# Patient Record
Sex: FEMALE | Race: WHITE | NOT HISPANIC OR LATINO | Employment: OTHER | URBAN - METROPOLITAN AREA
[De-identification: names, ages, dates, MRNs, and addresses within clinical notes are randomized per-mention and may not be internally consistent; named-entity substitution may affect disease eponyms.]

---

## 2017-06-22 ENCOUNTER — TRANSFERRED RECORDS (OUTPATIENT)
Dept: HEALTH INFORMATION MANAGEMENT | Facility: CLINIC | Age: 72
End: 2017-06-22

## 2017-07-06 ENCOUNTER — TRANSFERRED RECORDS (OUTPATIENT)
Dept: HEALTH INFORMATION MANAGEMENT | Facility: CLINIC | Age: 72
End: 2017-07-06

## 2019-08-15 ENCOUNTER — TRANSFERRED RECORDS (OUTPATIENT)
Dept: HEALTH INFORMATION MANAGEMENT | Facility: CLINIC | Age: 74
End: 2019-08-15

## 2019-09-17 ENCOUNTER — TRANSFERRED RECORDS (OUTPATIENT)
Dept: HEALTH INFORMATION MANAGEMENT | Facility: CLINIC | Age: 74
End: 2019-09-17

## 2021-01-19 ENCOUNTER — TRANSFERRED RECORDS (OUTPATIENT)
Dept: HEALTH INFORMATION MANAGEMENT | Facility: CLINIC | Age: 76
End: 2021-01-19

## 2022-01-06 ENCOUNTER — TRANSFERRED RECORDS (OUTPATIENT)
Dept: HEALTH INFORMATION MANAGEMENT | Facility: CLINIC | Age: 77
End: 2022-01-06
Payer: COMMERCIAL

## 2022-01-12 ENCOUNTER — TRANSFERRED RECORDS (OUTPATIENT)
Dept: HEALTH INFORMATION MANAGEMENT | Facility: CLINIC | Age: 77
End: 2022-01-12
Payer: COMMERCIAL

## 2022-01-12 ENCOUNTER — LAB REQUISITION (OUTPATIENT)
Dept: LAB | Facility: CLINIC | Age: 77
End: 2022-01-12

## 2022-01-12 PROCEDURE — 88377 M/PHMTRC ALYS ISHQUANT/SEMIQ: CPT | Performed by: FAMILY MEDICINE

## 2022-01-12 PROCEDURE — 88291 CYTO/MOLECULAR REPORT: CPT | Performed by: MEDICAL GENETICS

## 2022-01-12 PROCEDURE — 88305 TISSUE EXAM BY PATHOLOGIST: CPT | Performed by: PATHOLOGY

## 2022-01-13 ENCOUNTER — TRANSFERRED RECORDS (OUTPATIENT)
Dept: HEALTH INFORMATION MANAGEMENT | Facility: CLINIC | Age: 77
End: 2022-01-13
Payer: COMMERCIAL

## 2022-01-14 LAB
INTERPRETATION: NORMAL
PATH REPORT.COMMENTS IMP SPEC: ABNORMAL
PATH REPORT.COMMENTS IMP SPEC: YES
PATH REPORT.FINAL DX SPEC: ABNORMAL
PATH REPORT.GROSS SPEC: ABNORMAL
PATH REPORT.MICROSCOPIC SPEC OTHER STN: ABNORMAL
PATH REPORT.RELEVANT HX SPEC: ABNORMAL
PHOTO IMAGE: ABNORMAL

## 2022-01-19 ENCOUNTER — MEDICAL CORRESPONDENCE (OUTPATIENT)
Dept: HEALTH INFORMATION MANAGEMENT | Facility: CLINIC | Age: 77
End: 2022-01-19
Payer: COMMERCIAL

## 2022-01-19 ENCOUNTER — TRANSCRIBE ORDERS (OUTPATIENT)
Dept: OTHER | Age: 77
End: 2022-01-19
Payer: COMMERCIAL

## 2022-01-19 DIAGNOSIS — C50.912 INVASIVE DUCTAL CARCINOMA OF LEFT BREAST (H): Primary | ICD-10-CM

## 2022-01-20 ENCOUNTER — PATIENT OUTREACH (OUTPATIENT)
Dept: ONCOLOGY | Facility: CLINIC | Age: 77
End: 2022-01-20
Payer: COMMERCIAL

## 2022-01-20 NOTE — PROGRESS NOTES
New Patient Oncology Nurse Navigator Note     Referring provider: Dr. Cat Wharton     Referring Clinic/Organization: [facility])     Referred to (specialty: Medical Oncology and Cancer Surgery      Date Referral Received: January 20, 2022     Evaluation for:  Breast cancer     Clinical History (per Nurse review of records provided):      Di had a bilateral diagnostic mammogram on 1/6/22 showing interval development of an oval, well-circumscribed 14 mm mass in the outer inferior left breast, anterior depth, just deep to the palpable marker.  Nothing specific for malignancy in the right breast.      1/6/22 Ultrasound showed 1.2 X 1.5 hypoechoic multi-lobulated mass at 4:00 4.0 cm from nipple of left breast.     1/12/22 - Left breast US-guided needle core biopsy showed IDC, Grade 2, ER/KS NEGATIVE, HER BY FISH POSITIVE    Records Location: [facility]     Records Needed:   Breast imaging from past 5 years    Telephoned and spoke with Di.  [place] location is preferred by patient.  Chart reviewed and instructions sent to NPS to book Mitch on 1/31 and Zarina on 2/1.  Call transferred to NPS to finalize appointments. Zaria Pino RN

## 2022-01-24 NOTE — PROGRESS NOTES
RECORDS STATUS - BREAST    RECORDS REQUESTED FROM: [facility]/ [facility]    DATE REQUESTED: 1/31/2022   NOTES DETAILS STATUS   OFFICE NOTE from referring provider Complete Dr. Cat Wharton   OFFICE NOTE from medical oncologist N/A    OFFICE NOTE from surgeon Complete See Breast Biopsy in EPIC   OFFICE NOTE from radiation oncologist     DISCHARGE SUMMARY from hospital     DISCHARGE REPORT from the ER     OPERATIVE REPORT Complete See Breast Biopsy in EPIC   MEDICATION LIST     CLINICAL TRIAL TREATMENTS TO DATE     LABS     REQUEST BLOCKS FOR ALL BREAST CANCER PTS     PATHOLOGY REPORTS  (Tissue diagnosis, Stage, ER/NJ percentage positive and intensity of staining, HER2 IHC, FISH, and all biopsies from breast and any distant metastasis)                 Complete 1/12/2022  Breast, left, 4:00 4.0 cm from nipple, ultrasound-guided needle core biopsy:  - Invasive ductal carcinoma with micropapillary features:                - Nikolski grade: II/III ; Denise score: 6/9 (tubules:3; nuclear:2; mitosis:1)                - Angiolymphatic invasion not identified in the planes examined.                - Ductal carcinoma in-situ: Present, cribriform, high grade with focal necrosis.                - Microcalcifications: Present; associated with invasive carcinoma.                - Longest linear length of contiguous invasive tumor: 9.0 mm                - ER: Negative (no staining in tumor nuclei)                - NJ: Negative (no staining in tumor nuclei)   GENONOMIC TESTING     TYPE:   (Next Generation Sequencing, including Foundation One testing, and Oncotype score) Complete 1/12/2022 Her 2 Gadiel Fish    IMAGING (NEED IMAGES & REPORT)     CT SCANS     MRI     MAMMO Complete- [facility]     [employer] Shipping Label:  [id number]    ULTRASOUND Complete - [facility]     PET     BONE SCAN     BRAIN MRI       Action    Action Taken 1/24/2022 3:26pm MARTINA WHITTAKER called pt Di - she had breast imaging done at the [facility]    1/27/2022 11:28AM MARTINA  I called [facility] Ph: [phone] #3 #1- I left a vm     I called [facility] Ph: [phone] - Fax: [phone]    1:29pm MARTINA   I faxed image reports to HIM

## 2022-01-25 NOTE — TELEPHONE ENCOUNTER
Action January 25, 2022 8:16 AM ABT   Action Taken Records from [facility] received and sent to HIM

## 2022-01-31 ENCOUNTER — ONCOLOGY VISIT (OUTPATIENT)
Dept: ONCOLOGY | Facility: CLINIC | Age: 77
End: 2022-01-31
Payer: COMMERCIAL

## 2022-01-31 ENCOUNTER — PRE VISIT (OUTPATIENT)
Dept: ONCOLOGY | Facility: CLINIC | Age: 77
End: 2022-01-31

## 2022-01-31 ENCOUNTER — PATIENT OUTREACH (OUTPATIENT)
Dept: ONCOLOGY | Facility: CLINIC | Age: 77
End: 2022-01-31

## 2022-01-31 VITALS
RESPIRATION RATE: 16 BRPM | BODY MASS INDEX: 34.15 KG/M2 | HEART RATE: 66 BPM | WEIGHT: 217.6 LBS | OXYGEN SATURATION: 99 % | HEIGHT: 67 IN | SYSTOLIC BLOOD PRESSURE: 153 MMHG | DIASTOLIC BLOOD PRESSURE: 86 MMHG

## 2022-01-31 DIAGNOSIS — T45.1X5A CHEMOTHERAPY INDUCED CARDIOMYOPATHY (H): ICD-10-CM

## 2022-01-31 DIAGNOSIS — C50.912 HER2-POSITIVE CARCINOMA OF LEFT BREAST (H): ICD-10-CM

## 2022-01-31 DIAGNOSIS — Z17.1 MALIGNANT NEOPLASM OF OVERLAPPING SITES OF LEFT BREAST IN FEMALE, ESTROGEN RECEPTOR NEGATIVE (H): Primary | ICD-10-CM

## 2022-01-31 DIAGNOSIS — I42.7 CHEMOTHERAPY INDUCED CARDIOMYOPATHY (H): ICD-10-CM

## 2022-01-31 DIAGNOSIS — Z17.31 HER2-POSITIVE CARCINOMA OF LEFT BREAST (H): ICD-10-CM

## 2022-01-31 DIAGNOSIS — C50.812 MALIGNANT NEOPLASM OF OVERLAPPING SITES OF LEFT BREAST IN FEMALE, ESTROGEN RECEPTOR NEGATIVE (H): Primary | ICD-10-CM

## 2022-01-31 PROCEDURE — 99204 OFFICE O/P NEW MOD 45 MIN: CPT | Performed by: INTERNAL MEDICINE

## 2022-01-31 RX ORDER — LISINOPRIL 40 MG/1
20 TABLET ORAL
COMMUNITY

## 2022-01-31 RX ORDER — GABAPENTIN 600 MG/1
600 TABLET ORAL 3 TIMES DAILY
COMMUNITY

## 2022-01-31 RX ORDER — MONTELUKAST SODIUM 4 MG/1
1 TABLET, CHEWABLE ORAL 2 TIMES DAILY
COMMUNITY

## 2022-01-31 RX ORDER — LEVOTHYROXINE SODIUM 112 UG/1
112 TABLET ORAL
COMMUNITY

## 2022-01-31 RX ORDER — BACLOFEN 10 MG/1
10 TABLET ORAL
COMMUNITY
End: 2023-02-09

## 2022-01-31 RX ORDER — INSULIN GLARGINE 100 [IU]/ML
40 INJECTION, SOLUTION SUBCUTANEOUS
COMMUNITY
End: 2023-06-01

## 2022-01-31 ASSESSMENT — MIFFLIN-ST. JEOR: SCORE: 1504.66

## 2022-01-31 ASSESSMENT — PAIN SCALES - GENERAL: PAINLEVEL: NO PAIN (0)

## 2022-01-31 NOTE — LETTER
"    2022         RE: Di Donaldson  [address]  [place] [address]      Oncology Rooming Note    2022 2:41 PM   Di Donaldson is a 76 year old female who presents for:    Chief Complaint   Patient presents with     Oncology Clinic Visit     New Patient     Initial Vitals: BP (!) 153/86 (BP Location: Left arm)   Pulse 66   Resp 16   Ht 1.702 m (5' 7\")   Wt 98.7 kg (217 lb 9.6 oz)   SpO2 99%   BMI 34.08 kg/m   Estimated body mass index is 34.08 kg/m  as calculated from the following:    Height as of this encounter: 1.702 m (5' 7\").    Weight as of this encounter: 98.7 kg (217 lb 9.6 oz). Body surface area is 2.16 meters squared.  No Pain (0) Comment: Data Unavailable   No LMP recorded. Patient has had a hysterectomy.  Allergies reviewed: Yes  Medications reviewed: Yes    Medications: Medication refills not needed today.  Pharmacy name entered into [employer]: [id number] - [place], [place] - [address] [facility]    Clinical concerns: New Patient- breast cancer       April RASHAD Darnell                [facility] Hematology / Oncology  Initial Visit / Consultation Note 2022  Name: Di Donaldson  :  1945  MRN:  [id number]    --------------------    Assessment / Plan:  Early-stage, left-sided, hormone negative, HER-2 positive breast cancer.    Over the course of our visit, Di and I reviewed the natural history of what appears to be early stage, hormone negative, HER-2 positive breast cancer.  Based upon the size of the known tumor and the HER-2 positivity, I would recommend a course of neoadjuvant therapy using Taxol/Herceptin/Perjeta for 3 months preoperatively.  The goal would be to achieve a complete pathologic response rate which is associated with the best possible postsurgical prognosis.  If she achieves a complete pathologic response, we would plan on adjuvant Herceptin for a total of 9 months.  If she continues to have residual disease despite " neoadjuvant chemotherapy, we would likely anticipate a switch to adjuvant Kadcyla for a total of 9 months.  There is a clinical trial that she potentially could be eligible for as well from a neoadjuvant standpoint.  For now, I will reach out to her upcoming breast cancer surgeon Dr. Srinivasan to discuss additional imaging as well.  We reviewed the logistics of systemic therapy administration as well as the need for a port and an echo pretreatment.  All questions answered to the best of my abilities.  With her family history of multiple cancers including breast, referral to genetic counseling will be placed as well.  Di is in agreement with the plan.    Thank you kindly for this consultation.  Osorio Meyer MD    --------------------    Interval History:  Di present for evaluation of left-sided breast cancer.  She presented with a palpable mass.    Diagnostic left breast ultrasound January 6 demonstrates a 1.2 x 1.5 cm hypoechoic mass with micro lobulations and internal vascularity at the 4 o'clock position in the left breast 4 cm from the nipple correlating with the palpable abnormality.  The left axilla was negative for adenopathy.    Diagnostic mammogram January 6 revealed an oval well-circumscribed 14 mm mass in the outer inferior left breast anterior depth just deep to the palpable marker.  No abnormalities noted in the right breast.  And a biopsy clip was noted in the left breast.      Breast biopsy performed January 12 revealed invasive ductal carcinoma with micropapillary features grade 2 of 3.  Angiolymphatic invasion was not noted.  Ductal carcinoma in situ was present with a cribriform high-grade focal necrosis pattern.  Microcalcifications were present.  ER and RI were both negative and HER-2 was positive.    Beyond the palpable mass, she will has not noticed any other symptoms.  No breast changes or skin changes.    --------------------    Review of Systems:  10 point ROS negative except for  "that above.    Past Medical / Surgical History:  Patient Active Problem List   Diagnosis     Malignant neoplasm of overlapping sites of left breast in female, estrogen receptor negative (H)       Family History:  Positive for multiple sisters and a mother with breast cancer.    Social History:  Social History     Socioeconomic History     Marital status: Single     Spouse name: None     Number of children: None     Years of education: None     Highest education level: None   Occupational History     None   Tobacco Use     Smoking status: Never Smoker     Smokeless tobacco: Never Used   Substance and Sexual Activity     Alcohol use: None     Drug use: None     Sexual activity: None   Other Topics Concern     None   Social History Narrative     None     Social Determinants of Health     Financial Resource Strain: Not on file   Food Insecurity: Not on file   Transportation Needs: Not on file   Physical Activity: Not on file   Stress: Not on file   Social Connections: Not on file   Intimate Partner Violence: Not on file   Housing Stability: Not on file       Medications / Allergies:  Reviewed in EMR.    --------------------    Physical Exam:  VS: BP (!) 153/86 (BP Location: Left arm)   Pulse 66   Resp 16   Ht 1.702 m (5' 7\")   Wt 98.7 kg (217 lb 9.6 oz)   SpO2 99%   BMI 34.08 kg/m    GEN: Well appearing.  ROXANN: No palpable axillary adenopathy.    Labs / Imaging / Path:  We reviewed labs, personally reviewed imaging and reviewed pathology reports        Osorio Meyer MD  "

## 2022-01-31 NOTE — PROGRESS NOTES
"Oncology Rooming Note    January 31, 2022 2:41 PM   Di Donaldson is a 76 year old female who presents for:    Chief Complaint   Patient presents with     Oncology Clinic Visit     New Patient     Initial Vitals: BP (!) 153/86 (BP Location: Left arm)   Pulse 66   Resp 16   Ht 1.702 m (5' 7\")   Wt 98.7 kg (217 lb 9.6 oz)   SpO2 99%   BMI 34.08 kg/m   Estimated body mass index is 34.08 kg/m  as calculated from the following:    Height as of this encounter: 1.702 m (5' 7\").    Weight as of this encounter: 98.7 kg (217 lb 9.6 oz). Body surface area is 2.16 meters squared.  No Pain (0) Comment: Data Unavailable   No LMP recorded. Patient has had a hysterectomy.  Allergies reviewed: Yes  Medications reviewed: Yes    Medications: Medication refills not needed today.  Pharmacy name entered into [employer]: [id number] - [place], [place] - [address] [facility] [address] [facility]    Clinical concerns: New Patient- breast cancer       Letty Darnell LPN              "

## 2022-01-31 NOTE — PATIENT INSTRUCTIONS
1) PORT.  2) ECHO.  3) Pending PA, weekly taxol / herceptin x 12 weeks w/ q3 week perjeta.  4) Provider weeks 1/4/7/10.      Today:  Plan for port placement and ECHO soon!  Will start treatment as soon as port is in place.    Consult with Dr. Reese for Port Date/Time: 2/2/22 at 10:30    ECHO Date/Time: 2/2/22 at 11:00    Port placement Date: 2/7/22    Chemotherapy teaching Date/Time: 2/4/22 at 12:30    Please follow up with Dr. Meyer with as schedule with treatments.  See Calendar!    Labs Date/Time: 2/7/22 at 8:30  Video Visit Dr. Meyer Date/Time: 2/8/22 at 8:45  Infusion Date/Time:  2/8/22 at 10:00    If you have any questions or concerns please feel free to call.    If you need to reschedule please call:  Clinic or Lab Appointment - [phone]  Infusion - [phone]  Imaging - [phone]    Rony Longo, RN, BSN, OCN  [facility]   Oncology/Hematology Care Coordinator RN  [facility]  [phone]    After Hours Oncology Nurse Line - [phone]

## 2022-02-01 ENCOUNTER — PATIENT OUTREACH (OUTPATIENT)
Dept: ONCOLOGY | Facility: CLINIC | Age: 77
End: 2022-02-01

## 2022-02-01 ENCOUNTER — OFFICE VISIT (OUTPATIENT)
Dept: SURGERY | Facility: CLINIC | Age: 77
End: 2022-02-01
Payer: COMMERCIAL

## 2022-02-01 VITALS — WEIGHT: 213.1 LBS | HEIGHT: 67 IN | BODY MASS INDEX: 33.45 KG/M2

## 2022-02-01 DIAGNOSIS — Z17.1 MALIGNANT NEOPLASM OF CENTRAL PORTION OF LEFT BREAST IN FEMALE, ESTROGEN RECEPTOR NEGATIVE (H): Primary | ICD-10-CM

## 2022-02-01 DIAGNOSIS — C50.112 MALIGNANT NEOPLASM OF CENTRAL PORTION OF LEFT BREAST IN FEMALE, ESTROGEN RECEPTOR NEGATIVE (H): Primary | ICD-10-CM

## 2022-02-01 PROBLEM — C50.412 MALIGNANT NEOPLASM OF UPPER-OUTER QUADRANT OF LEFT BREAST IN FEMALE, ESTROGEN RECEPTOR NEGATIVE (H): Status: ACTIVE | Noted: 2022-01-31

## 2022-02-01 PROCEDURE — 88305 TISSUE EXAM BY PATHOLOGIST: CPT | Performed by: PATHOLOGY

## 2022-02-01 PROCEDURE — 99205 OFFICE O/P NEW HI 60 MIN: CPT | Performed by: SURGERY

## 2022-02-01 RX ORDER — TRIAMCINOLONE ACETONIDE 1 MG/G
OINTMENT TOPICAL
COMMUNITY
Start: 2022-01-18

## 2022-02-01 RX ORDER — INSULIN LISPRO 100 [IU]/ML
INJECTION, SOLUTION INTRAVENOUS; SUBCUTANEOUS
COMMUNITY

## 2022-02-01 ASSESSMENT — MIFFLIN-ST. JEOR: SCORE: 1484.25

## 2022-02-01 ASSESSMENT — PAIN SCALES - GENERAL: PAINLEVEL: NO PAIN (0)

## 2022-02-01 NOTE — PROGRESS NOTES
NEW SURGICAL CONSULTATION  Feb 1, 2022    Di Donaldson is a 76 year old woman who presents with a left  breast complaint.  She was self-referred.    HPI:    She noted a left breast mass over the past 2 months.  She notes no other masses in either breast, axilla, or neck. She denies any nipple discharge or nipple inversion.     Imaging showed a 1.5 cm mass at 4:00 4 cm FN in the LEFT breast.    A biopsy was performed and a clip was placed.  It showed invasive ductal carcinoma, grade 2, ER- [place]- HER2/mathew amplified.     She met with Dr Meyer of Medical Oncology yesterday, who recommended neoadjuvant systemic therapy.     BREAST-SPECIFIC HISTORY:  Prior breast biopsies: Yes - right, benign  Prior breast surgeries: No  Prior radiation history: No  Bra size: 42 DD  Dominant hand: Right    FAMILY HISTORY:  Breast ca: Yes - mother (dx 40s), older sister (dx 60s), and younger sisters x2 (dx 60s)  Ovarian ca: No  Pancreatic ca: No  Melanoma: No  Gastric ca: No  Colon ca: No  Other cancer: Yes brother w/ uncertain type(s) of cancer, including bone cancer, prostate cancer    Her children are adopted.    Past Medical History:   Diagnosis Date     hypertension      Hypothyroidism      Type 2 diabetes mellitus (H)      No MI, CVA  HbA1c 6.2 (per patient, in January 2022)    No past surgical history on file.   Partial hysterectomy (one ovary remaining)  Cervical spine surgery  No GA issues    Current Outpatient Medications   Medication Sig Dispense Refill     baclofen (LIORESAL) 10 MG tablet Take 10 mg by mouth       colestipol (COLESTID) 1 g tablet Take 1 g by mouth 2 times daily        gabapentin (NEURONTIN) 600 MG tablet Take 600 mg by mouth 3 times daily        insulin glargine (BASAGLAR KWIKPEN) 100 UNIT/ML pen Inject 40 Units Subcutaneous       insulin lispro (HUMALOG KWIKPEN) 100 UNIT/ML (1 unit dial) KWIKPEN Inject Subcutaneous 3 times daily (before meals)       levothyroxine (SYNTHROID/LEVOTHROID) 112 MCG tablet  "Take 112 mcg by mouth       lisinopril (ZESTRIL) 40 MG tablet Take 20 mg by mouth       magnesium 100 MG CAPS Take 400 mg by mouth 2 times daily        triamcinolone (KENALOG) 0.1 % external ointment APPLY TO AFFECTED AREA EVERY NIGHT AT BEDTIME             Allergies   Allergen Reactions     Hmg-Coa-R Inhibitors Unknown     Clindamycin Other (See Comments)     mild        SOCIAL HISTORY:  Smokes: No    She is retired and worked at a  center.    ROS:  Back pain: No  Headache: No - hx of migraines (resolved with c-spine fusion)  Abdominal pain: No  Unexpected weight loss: No  Easy bruising/bleeding: No    Ht 1.702 m (5' 7\")   Wt 96.7 kg (213 lb 1.6 oz)   BMI 33.38 kg/m     Physical Exam  Constitutional:       Appearance: She is well-developed.   Chest:   Breasts: Breasts are symmetrical (bilateral ptosis).      Right: No inverted nipple, mass, nipple discharge, skin change, tenderness, axillary adenopathy or supraclavicular adenopathy.      Left: Mass (2.5 cm firm mass just below the skin surface with overlying skin indentation that is not fixed but that tracks towards the nipple) and skin change present. No inverted nipple (There is a central ulceration/indentation in the left nipple that is not present on the right (see photo).), nipple discharge, tenderness, axillary adenopathy or supraclavicular adenopathy.            Comments: Patient was examined in both supine and upright positions.   Lymphadenopathy:      Cervical: No cervical adenopathy.      Right cervical: No superficial, deep or posterior cervical adenopathy.     Left cervical: No superficial, deep or posterior cervical adenopathy.      Upper Body:      Right upper body: No supraclavicular, axillary or pectoral adenopathy.      Left upper body: No supraclavicular, axillary or pectoral adenopathy.      Comments: No lymphedema in bilateral upper extremities.   Skin:     General: Skin is warm and dry.        INVESTIGATIONS:    Left Breast Ultrasound " from [facility] (1/6/2022) showed:  FINDINGS: Diagnostic left breast US demonstrates a 1.2 x 1.5 cm taller than wide hypoechoic mass with microlobulations and moderate internal vascularity, in the 4:00 position of the left breast, 4 cm from the nipple. This finding correlates with the palpable abnormality. Scannign of the left axilla is negative for adenopathy.  BIRADS 5    Bilateral Diagnostic Mammogram from [facility] (1/6/2022) showed:  Density: There are scattered areas of fibroglandular ensity.  Findings: Interval development of an oval, well-circumscribed 14 mm mass in the outer inferior left breast, anterior depth, just deep to the palpable marker. Nothing specific for malignancy in the right breast. Biopsy clip left breast.  BIRADS 0    Biopsy (1/12/2022) showed:  Final Diagnosis   Breast, left, 4:00 4.0 cm from nipple, ultrasound-guided needle core biopsy:  - Invasive ductal carcinoma with micropapillary features:                - Conde grade: II/III ; Denise score: 6/9 (tubules:3; nuclear:2; mitosis:1)                - Angiolymphatic invasion not identified in the planes examined.                - Ductal carcinoma in-situ: Present, cribriform, high grade with focal necrosis.                - Microcalcifications: Present; associated with invasive carcinoma.                - Longest linear length of contiguous invasive tumor: 9.0 mm   ER negative  MD negative  HER2/mathew positive    ASSESSMENT:  Di Donaldson is a 76 year old woman with a HER2 positive LEFT breast cancer.    Her stage is:  Cancer Staging  Malignant neoplasm of upper-outer quadrant of left breast in female, estrogen receptor negative (H)  Staging form: Breast, AJCC 8th Edition  - Clinical: Stage IA (cT1c, cN0, cM0, G2, ER-, MD-, HER2+) - Signed by Ryann Srinivasan MD on 2/1/2022    I personally reviewed the imaging above.    I reviewed the imaging, diagnosis, staging, and management (including surgery,  "chemotherapy, radiation therapy, and endocrine therapy) of breast cancer with Di Donaldson and her daughter Shauna. A copy of the biopsy pathology report was also provided.    I am concerned about the proximity of the palpable mass to the nipple and the central ulceration in the nipple.  I recommended a punch biopsy of the nipple to rule out Paget's disease of the breast.  Di Donaldson and her daughter were in agreement with this.      The mainstay of treatment for resectable breast cancer is surgical resection, in the form of either breast conservation (segmental mastectomy plus radiation) or mastectomy.  We reviewed that the two strategies are equivalent in terms of overall survival.  The advantages and disadvantages of each were discussed.    The surgeries are performed as day surgeries.  Di Donaldson IS a candidate for breast conservation therapy.  We discussed that this involves two necessary components: the lumpectomy (or \"segmental mastectomy\"), and several weeks of whole breast radiation therapy.  We discussed that the overall survival after breast conservation therapy is identical to mastectomy and that local recurrence rates are significantly higher if segmental mastectomy was performed without subsequent radiation.  We also discussed the significance of clear margins and that a subsequent procedure may be necessary to achieve this.  Depending on the nipple biopsy results, a central segmental mastectomy (which would include the nipple-areolar complex) may be needed.    The risks of a segmental mastectomy were discussed with the patient and family, including the risks of bleeding, wound infection, wound dehiscence, and post-operative contour change (including scar formation) to the breast.  Depending on the margin status post-resection, further surgery may be necessary.     Alternatively, we also discussed the option of mastectomy, including total, skin-sparing, and nipple-sparing mastectomy.  " We reviewed that the nipple-sparing technique is cosmetic; sensation and contractility will likely be lost.  Di Donaldson is not a candidate for nipple-sparing mastectomy owing to ptosis.  The risks of a mastectomy were discussed with the patient and family, including the risks of bleeding, wound infection, wound dehiscence, skin flap/nipple necrosis, poor cosmetic outcomes with skin folds, decreased shoulder range of motion, chest wall numbness, etc.   A drain would be placed intra-operatively. Depending on the margin and fox status post-mastectomy, radiation may be necessary.      In addition to the surgical management of the breast, a sentinel lymph node biopsy is recommended for fox staging of the axilla.  This is performed with the combination of the radioactive Tilmanocept and lymphazurin. The risks of a sentinel lymph node biopsy were discussed with the patient and family, including the risks of lymphedema (5-10%), bleeding, wound infection, wound dehiscence, seroma formation, and paresthesias. There is an approximately 10% false negative rate associated with sentinel lymph node biopsy as published in the literature.  The findings of the sentinel lymph node biopsy may result in the need for further surgery (i.e. Axillary lymph node dissection) or radiation. There is a 1-2% chance of patients whose sentinel lymph nodes do not map despite dual tracer (radioactive Tilmanocept and lymphazurin). Should this be the case, we discussed that I would proceed with an axillary lymph node dissection at the index procedure if proceeding with a mastectomy.  The higher risks of an axillary lymph node dissection were also reviewed, including lymphedema (20-30%), bleeding, wound infection, wound dehiscence, seroma formation, nerve injury, limited arm range of motion and paresthesias. We discussed that a drain would be placed intra-operatively should an axillary lymph node dissection be performed.     We discussed that  surgical pathology results will be reviewed at the postoperative visit to allow for careful discussion of next steps and for answering questions.    Finally, given that she has a HER2 amplified cancer, she will begin with neoadjuvant systemic therapy.  Surgery is typically performed 4-6 weeks after neoadjuvant chemotherapy is completed.  We reviewed that the benefits of neoadjuvant systemic therapy include potential prognostic information from tumor pathology post-chemotherapy and the ability to determine adjuvant therapy. She will proceed with this with Dr Meyer.    We reviewed the genetic testing guidelines by the American Society of Breast Surgeons from February 2019.  I have recommended genetic counseling; however, at age 76, I would not necessarily recommend risk reduction surgery if she were diagnosed with a pathogenic variant, as the benefits likely do not outweigh the risks.     All of the above was discussed with Di Donaldson and all questions were answered.  She elected to proceed with left nipple biopsy today and neoadjuvant systemic therapy per Dr Meyer.    Total time spent with the patient was 60 minutes, of which 75% was counseling.     PLAN:  1. Genetic counseling  2. Neoadjuvant systemic therapy per Dr Meyer  3. Left nipple punch biopsy  4. Follow up with me at the end of neoadjuvant systemic therapy for surgical planning.    Ryann Srinivasan MD MSc [facility] FACS  Assistant Professor of Surgery  Division of Surgical Oncology  [facility]     70 minutes spent on the date of the encounter doing chart review, review of outside records, review of test results, interpretation of tests, patient visit, documentation and discussion with family.  The punch biopsy documented below is time in addition to that documented above.    --    PROCEDURE NOTE:    LEFT nipple punch biopsy    With informed consent and a time out, the left nipple was prepped with Chloraprep.  0.5 mL of 1% lidocaine with  epinephrine was infiltrated. A 2 mm punch biopsy was taken of the central ulceration in the left nipple.  The tissue was placed in formalin and sent to pathology.  Hemostasis was achieved with pressure and a simple 5-0 monocryl.  Vaseline and a dressing were applied. Patient and her daughter were provided wound care instructions.  Patient tolerated the procedure well.    Ryann Srinivasan MD MSc FRCSC FACS  Assistant Professor of Surgery  Division of Surgical Oncology  [facility]

## 2022-02-01 NOTE — PROGRESS NOTES
[facility] Hematology / Oncology  Initial Visit / Consultation Note 2022  Name: Di Donaldson  :  1945  MRN:  [id number]    --------------------    Assessment / Plan:  Early-stage, left-sided, hormone negative, HER-2 positive breast cancer.    Over the course of our visit, Di and I reviewed the natural history of what appears to be early stage, hormone negative, HER-2 positive breast cancer.  Based upon the size of the known tumor and the HER-2 positivity, I would recommend a course of neoadjuvant therapy using Taxol/Herceptin/Perjeta for 3 months preoperatively.  The goal would be to achieve a complete pathologic response rate which is associated with the best possible postsurgical prognosis.  If she achieves a complete pathologic response, we would plan on adjuvant Herceptin for a total of 9 months.  If she continues to have residual disease despite neoadjuvant chemotherapy, we would likely anticipate a switch to adjuvant Kadcyla for a total of 9 months.  There is a clinical trial that she potentially could be eligible for as well from a neoadjuvant standpoint.  For now, I will reach out to her upcoming breast cancer surgeon Dr. Srinivasan to discuss additional imaging as well.  We reviewed the logistics of systemic therapy administration as well as the need for a port and an echo pretreatment.  All questions answered to the best of my abilities.  With her family history of multiple cancers including breast, referral to genetic counseling will be placed as well.  Di is in agreement with the plan.    Thank you kindly for this consultation.  Osorio Meyer MD    --------------------    Interval History:  Di present for evaluation of left-sided breast cancer.  She presented with a palpable mass.    Diagnostic left breast ultrasound  demonstrates a 1.2 x 1.5 cm hypoechoic mass with micro lobulations and internal vascularity at the 4 o'clock position in the left breast 4  cm from the nipple correlating with the palpable abnormality.  The left axilla was negative for adenopathy.    Diagnostic mammogram January 6 revealed an oval well-circumscribed 14 mm mass in the outer inferior left breast anterior depth just deep to the palpable marker.  No abnormalities noted in the right breast.  And a biopsy clip was noted in the left breast.      Breast biopsy performed January 12 revealed invasive ductal carcinoma with micropapillary features grade 2 of 3.  Angiolymphatic invasion was not noted.  Ductal carcinoma in situ was present with a cribriform high-grade focal necrosis pattern.  Microcalcifications were present.  ER and ID were both negative and HER-2 was positive.    Beyond the palpable mass, she will has not noticed any other symptoms.  No breast changes or skin changes.    --------------------    Review of Systems:  10 point ROS negative except for that above.    Past Medical / Surgical History:  Patient Active Problem List   Diagnosis     Malignant neoplasm of overlapping sites of left breast in female, estrogen receptor negative (H)       Family History:  Positive for multiple sisters and a mother with breast cancer.    Social History:  Social History     Socioeconomic History     Marital status: Single     Spouse name: None     Number of children: None     Years of education: None     Highest education level: None   Occupational History     None   Tobacco Use     Smoking status: Never Smoker     Smokeless tobacco: Never Used   Substance and Sexual Activity     Alcohol use: None     Drug use: None     Sexual activity: None   Other Topics Concern     None   Social History Narrative     None     Social Determinants of Health     Financial Resource Strain: Not on file   Food Insecurity: Not on file   Transportation Needs: Not on file   Physical Activity: Not on file   Stress: Not on file   Social Connections: Not on file   Intimate Partner Violence: Not on file   Housing Stability:  "Not on file       Medications / Allergies:  Reviewed in EMR.    --------------------    Physical Exam:  VS: BP (!) 153/86 (BP Location: Left arm)   Pulse 66   Resp 16   Ht 1.702 m (5' 7\")   Wt 98.7 kg (217 lb 9.6 oz)   SpO2 99%   BMI 34.08 kg/m    GEN: Well appearing.  ROXANN: No palpable axillary adenopathy.    Labs / Imaging / Path:  We reviewed labs, personally reviewed imaging and reviewed pathology reports  "

## 2022-02-01 NOTE — PROGRESS NOTES
[facility]: Chemotherapy Education Notes    Chemotherapy education was completed with patient and daughter today in person. Handouts from [employer] were discussed and provided to the patient to take home. Reviewed the following information with the patient and her daughter.    Chemotherapy Regimen and Schedule: Taxol/Herceptin  with infusions every week x 12 weeks are planned.    Tests and/or procedures required prior to chemotherapy start:   1. Port  2. Echo    General Chemotherapy Information: Specific medication names and medication delivery methods; possible chemotherapy side effects and management of side effects, including but not limited to: skin changes/nail changes, anemia, neutropenia, thrombocytopenia, diarrhea/constipation, nausea/vomiting, hair loss, memory changes, mouth sores, taste changes, appetite changes, peripheral neuropathy, fatigue, infertility, myelosuppression, increased risk for infection, increased risk for bleeding and/or bruising, possible allergic or hypersensitivity reaction.  Reviewed the importance of infection prevention, and ways to stay healthy during chemotherapy including eating a health, well-balanced diet, adequate protein intake, and drinking plenty of fluids.  Reviewed the practice of closely monitoring lab values throughout chemotherapy treatment, what lab tests will be checked (and what changes of these values meant), along with the possibility of IV hydration or blood product transfusion, or the need to defer or hold treatment.  Also reviewed signs/symptoms that should be reported to care team or on-call provider immediately, including: temperature of 100.4 degrees or higher, shortness of breath, chest pain, unusual bruising, bleeding symptoms, extreme fatigue, >4-6 episodes of diarrhea in 24 hours, uncontrolled nausea/vomiting, symptoms of dehydration, or signs of an allergic reaction.      Reviewed importance of Central line care (port-a-cath) or IV site care.   "Provided [employer] handout \"Vascular Access Port Implantation,\" discussed port placement procedure and rationale for port placement, as well as usage and purpose of EMLA cream prior to port access.     MD has discussed potential gonadotoxicity and detrimental effects on fertility related to chemotherapy treatment.  Patient verbalizes understanding.    Written Information: Patient was provided with the following handouts from [employer]: \"Getting Ready for Chemotherapy: What to Expect, Before, During, and After your Treatment,\" \"Eating Hints: Before, During, and After Cancer Treatment,\" printouts on possible chemotherapy side effects/ways to cope with side effects, \"When to Call the Doctor,\" and \"Self-Care Tips During Cancer Treatment.\"  Patient was also provided with drug-specific handouts from [facility].  Patient was also provided with information regarding various programs offered at [facility], hair loss resources (if applicable), a list of resources for cancer patients, as well as important contact information for our cancer clinic including scheduling team, nurse triage line, direct phone number for RN Care Coordinator, and the after-hours Nurse Advice Line.    No barriers to learning identified. Patient and daughter verbalized understanding of all written and verbal information. All questions answered to patient s satisfaction.  Learning barrier identified, as patient reports she cannot read. Daughter agrees to review education given with patient. Patient states understanding and is in agreement with this plan.  Patient accompanied by writer to  to begin scheduling needs. Patient instructed to call with further questions or concerns.    Loan Mir RN  Oncology Care Coordinator  [facility]        "

## 2022-02-01 NOTE — LETTER
2/1/2022         RE: iD Donaldson  [id number]  [place] [address]        Dear Colleague,    Thank you for referring your patient, Di Donaldson, to the [facility]. Please see a copy of my visit note below.    NEW SURGICAL CONSULTATION  Feb 1, 2022    Di Donaldson is a 76 year old woman who presents with a left  breast complaint.  She was self-referred.    HPI:    She noted a left breast mass over the past 2 months.  She notes no other masses in either breast, axilla, or neck. She denies any nipple discharge or nipple inversion.     Imaging showed a 1.5 cm mass at 4:00 4 cm FN in the LEFT breast.    A biopsy was performed and a clip was placed.  It showed invasive ductal carcinoma, grade 2, ER- UT- HER2/mathew amplified.     She met with Dr Meyer of Medical Oncology yesterday, who recommended neoadjuvant systemic therapy.     BREAST-SPECIFIC HISTORY:  Prior breast biopsies: Yes - right, benign  Prior breast surgeries: No  Prior radiation history: No  Bra size: 42 DD  Dominant hand: Right    FAMILY HISTORY:  Breast ca: Yes - mother (dx 40s), older sister (dx 60s), and younger sisters x2 (dx 60s)  Ovarian ca: No  Pancreatic ca: No  Melanoma: No  Gastric ca: No  Colon ca: No  Other cancer: Yes brother w/ uncertain type(s) of cancer, including bone cancer, prostate cancer    Her children are adopted.    Past Medical History:   Diagnosis Date     hypertension      Hypothyroidism      Type 2 diabetes mellitus (H)      No MI, CVA  HbA1c 6.2 (per patient, in January 2022)    No past surgical history on file.   Partial hysterectomy (one ovary remaining)  Cervical spine surgery  No GA issues    Current Outpatient Medications   Medication Sig Dispense Refill     baclofen (LIORESAL) 10 MG tablet Take 10 mg by mouth       colestipol (COLESTID) 1 g tablet Take 1 g by mouth 2 times daily        gabapentin (NEURONTIN) 600 MG tablet Take 600 mg by mouth 3 times daily        insulin  "glargine (BASAGLAR KWIKPEN) 100 UNIT/ML pen Inject 40 Units Subcutaneous       insulin lispro (HUMALOG KWIKPEN) 100 UNIT/ML (1 unit dial) KWIKPEN Inject Subcutaneous 3 times daily (before meals)       levothyroxine (SYNTHROID/LEVOTHROID) 112 MCG tablet Take 112 mcg by mouth       lisinopril (ZESTRIL) 40 MG tablet Take 20 mg by mouth       magnesium 100 MG CAPS Take 400 mg by mouth 2 times daily        triamcinolone (KENALOG) 0.1 % external ointment APPLY TO AFFECTED AREA EVERY NIGHT AT BEDTIME             Allergies   Allergen Reactions     Hmg-Coa-R Inhibitors Unknown     Clindamycin Other (See Comments)     mild        SOCIAL HISTORY:  Smokes: No    She is retired and worked at a  center.    ROS:  Back pain: No  Headache: No - hx of migraines (resolved with c-spine fusion)  Abdominal pain: No  Unexpected weight loss: No  Easy bruising/bleeding: No    Ht 1.702 m (5' 7\")   Wt 96.7 kg (213 lb 1.6 oz)   BMI 33.38 kg/m     Physical Exam  Constitutional:       Appearance: She is well-developed.   Chest:   Breasts: Breasts are symmetrical (bilateral ptosis).      Right: No inverted nipple, mass, nipple discharge, skin change, tenderness, axillary adenopathy or supraclavicular adenopathy.      Left: Mass (2.5 cm firm mass just below the skin surface with overlying skin indentation that is not fixed but that tracks towards the nipple) and skin change present. No inverted nipple (There is a central ulceration/indentation in the left nipple that is not present on the right (see photo).), nipple discharge, tenderness, axillary adenopathy or supraclavicular adenopathy.            Comments: Patient was examined in both supine and upright positions.   Lymphadenopathy:      Cervical: No cervical adenopathy.      Right cervical: No superficial, deep or posterior cervical adenopathy.     Left cervical: No superficial, deep or posterior cervical adenopathy.      Upper Body:      Right upper body: No supraclavicular, axillary " or pectoral adenopathy.      Left upper body: No supraclavicular, axillary or pectoral adenopathy.      Comments: No lymphedema in bilateral upper extremities.   Skin:     General: Skin is warm and dry.        INVESTIGATIONS:    Left Breast Ultrasound from [facility] (1/6/2022) showed:  FINDINGS: Diagnostic left breast US demonstrates a 1.2 x 1.5 cm taller than wide hypoechoic mass with microlobulations and moderate internal vascularity, in the 4:00 position of the left breast, 4 cm from the nipple. This finding correlates with the palpable abnormality. Scannign of the left axilla is negative for adenopathy.  BIRADS 5    Bilateral Diagnostic Mammogram from [facility] (1/6/2022) showed:  Density: There are scattered areas of fibroglandular ensity.  Findings: Interval development of an oval, well-circumscribed 14 mm mass in the outer inferior left breast, anterior depth, just deep to the palpable marker. Nothing specific for malignancy in the right breast. Biopsy clip left breast.  BIRADS 0    Biopsy (1/12/2022) showed:  Final Diagnosis   Breast, left, 4:00 4.0 cm from nipple, ultrasound-guided needle core biopsy:  - Invasive ductal carcinoma with micropapillary features:                - Denise grade: II/III ; Denise score: 6/9 (tubules:3; nuclear:2; mitosis:1)                - Angiolymphatic invasion not identified in the planes examined.                - Ductal carcinoma in-situ: Present, cribriform, high grade with focal necrosis.                - Microcalcifications: Present; associated with invasive carcinoma.                - Longest linear length of contiguous invasive tumor: 9.0 mm   ER negative  TN negative  HER2/mathew positive    ASSESSMENT:  Di Donaldson is a 76 year old woman with a HER2 positive LEFT breast cancer.    Her stage is:  Cancer Staging  Malignant neoplasm of upper-outer quadrant of left breast in female, estrogen receptor negative (H)  Staging form: Breast, AJCC 8th  "Edition  - Clinical: Stage IA (cT1c, cN0, cM0, G2, ER-, MS-, HER2+) - Signed by Ryann Srinivasan MD on 2/1/2022    I personally reviewed the imaging above.    I reviewed the imaging, diagnosis, staging, and management (including surgery, chemotherapy, radiation therapy, and endocrine therapy) of breast cancer with Di Donaldson and her daughter Shauna. A copy of the biopsy pathology report was also provided.    I am concerned about the proximity of the palpable mass to the nipple and the central ulceration in the nipple.  I recommended a punch biopsy of the nipple to rule out Paget's disease of the breast.  Di Donaldson and her daughter were in agreement with this.      The mainstay of treatment for resectable breast cancer is surgical resection, in the form of either breast conservation (segmental mastectomy plus radiation) or mastectomy.  We reviewed that the two strategies are equivalent in terms of overall survival.  The advantages and disadvantages of each were discussed.    The surgeries are performed as day surgeries.  Di Donaldson IS a candidate for breast conservation therapy.  We discussed that this involves two necessary components: the lumpectomy (or \"segmental mastectomy\"), and several weeks of whole breast radiation therapy.  We discussed that the overall survival after breast conservation therapy is identical to mastectomy and that local recurrence rates are significantly higher if segmental mastectomy was performed without subsequent radiation.  We also discussed the significance of clear margins and that a subsequent procedure may be necessary to achieve this.  Depending on the nipple biopsy results, a central segmental mastectomy (which would include the nipple-areolar complex) may be needed.    The risks of a segmental mastectomy were discussed with the patient and family, including the risks of bleeding, wound infection, wound dehiscence, and post-operative contour change " (including scar formation) to the breast.  Depending on the margin status post-resection, further surgery may be necessary.     Alternatively, we also discussed the option of mastectomy, including total, skin-sparing, and nipple-sparing mastectomy.  We reviewed that the nipple-sparing technique is cosmetic; sensation and contractility will likely be lost.  Di Donaldson is not a candidate for nipple-sparing mastectomy owing to ptosis.  The risks of a mastectomy were discussed with the patient and family, including the risks of bleeding, wound infection, wound dehiscence, skin flap/nipple necrosis, poor cosmetic outcomes with skin folds, decreased shoulder range of motion, chest wall numbness, etc.   A drain would be placed intra-operatively. Depending on the margin and fox status post-mastectomy, radiation may be necessary.      In addition to the surgical management of the breast, a sentinel lymph node biopsy is recommended for fox staging of the axilla.  This is performed with the combination of the radioactive Tilmanocept and lymphazurin. The risks of a sentinel lymph node biopsy were discussed with the patient and family, including the risks of lymphedema (5-10%), bleeding, wound infection, wound dehiscence, seroma formation, and paresthesias. There is an approximately 10% false negative rate associated with sentinel lymph node biopsy as published in the literature.  The findings of the sentinel lymph node biopsy may result in the need for further surgery (i.e. Axillary lymph node dissection) or radiation. There is a 1-2% chance of patients whose sentinel lymph nodes do not map despite dual tracer (radioactive Tilmanocept and lymphazurin). Should this be the case, we discussed that I would proceed with an axillary lymph node dissection at the index procedure if proceeding with a mastectomy.  The higher risks of an axillary lymph node dissection were also reviewed, including lymphedema (20-30%), bleeding,  wound infection, wound dehiscence, seroma formation, nerve injury, limited arm range of motion and paresthesias. We discussed that a drain would be placed intra-operatively should an axillary lymph node dissection be performed.     We discussed that surgical pathology results will be reviewed at the postoperative visit to allow for careful discussion of next steps and for answering questions.    Finally, given that she has a HER2 amplified cancer, she will begin with neoadjuvant systemic therapy.  Surgery is typically performed 4-6 weeks after neoadjuvant chemotherapy is completed.  We reviewed that the benefits of neoadjuvant systemic therapy include potential prognostic information from tumor pathology post-chemotherapy and the ability to determine adjuvant therapy. She will proceed with this with Dr Meyer.    We reviewed the genetic testing guidelines by the American Society of Breast Surgeons from February 2019.  I have recommended genetic counseling; however, at age 76, I would not necessarily recommend risk reduction surgery if she were diagnosed with a pathogenic variant, as the benefits likely do not outweigh the risks.     All of the above was discussed with Di Donaldson and all questions were answered.  She elected to proceed with left nipple biopsy today and neoadjuvant systemic therapy per Dr Meyer.    Total time spent with the patient was 60 minutes, of which 75% was counseling.     PLAN:  1. Genetic counseling  2. Neoadjuvant systemic therapy per Dr Meyer  3. Left nipple punch biopsy  4. Follow up with me at the end of neoadjuvant systemic therapy for surgical planning.    Ryann Srinivasan MD MSc [facility] FACS  Assistant Professor of Surgery  Division of Surgical Oncology  [facility]     70 minutes spent on the date of the encounter doing chart review, review of outside records, review of test results, interpretation of tests, patient visit, documentation and discussion with family.   The punch biopsy documented below is time in addition to that documented above.    --    PROCEDURE NOTE:    LEFT nipple punch biopsy    With informed consent and a time out, the left nipple was prepped with Chloraprep.  0.5 mL of 1% lidocaine with epinephrine was infiltrated. A 2 mm punch biopsy was taken of the central ulceration in the left nipple.  The tissue was placed in formalin and sent to pathology.  Hemostasis was achieved with pressure and a simple 5-0 monocryl.  Vaseline and a dressing were applied. Patient and her daughter were provided wound care instructions.  Patient tolerated the procedure well.    Ryann Srinivasan MD MSc [facility] FACS  Assistant Professor of Surgery  Division of Surgical Oncology  [facility]       Again, thank you for allowing me to participate in the care of your patient.        Sincerely,        Ryann Srinivasan MD

## 2022-02-01 NOTE — PATIENT INSTRUCTIONS
Wound Care After a Biopsy    What is a skin biopsy?  A skin biopsy allows the doctor to examine a very small piece of tissue under the microscope to determine the diagnosis and the best treatment for the skin condition. A local anesthetic (numbing medicine)  is injected with a very small needle into the skin area to be tested. A small piece of skin is taken from the area. Sometimes a suture (stitch) is used.     What are the risks of a skin biopsy?  I will experience scar, bleeding, swelling, pain, crusting and redness. I may experience incomplete removal or recurrence. Risks of this procedure are excessive bleeding, bruising, infection, nerve damage, numbness, thick (hypertrophic or keloidal) scar and non-diagnostic biopsy.    How should I care for my wound for the first 48 hours?    Keep the wound dry and covered for 48 hours    If it bleeds, hold direct pressure on the area for 15 minutes. If bleeding does not stop then go to the emergency room    Avoid strenuous exercise the first 1-2 days or as your doctor instructs you    How should I care for the wound after 48 hours?    After 48 hours, remove the bandage    You may bathe or shower as normal    If you had a scalp biopsy, you can shampoo as usual and can use shower water to clean the biopsy site daily    Clean the wound twice a day with gentle soap and water    Do not scrub, be gentle    Apply white petroleum/Vaseline after cleaning the wound with a cotton swab or a clean finger, and keep the site covered with a Bandaid /bandage. Bandages are not necessary with a scalp biopsy    If you are unable to cover the site with a Bandaid /bandage, re-apply ointment 2-3 times a day to keep the site moist. Moisture will help with healing    Avoid strenuous activity for first 1-2 days    Avoid lakes, rivers, pools, and oceans until the stitches are removed or the site is healed    How do I clean my wound?    Wash hands thoroughly with soap or use hand  before all  wound care    Clean the wound with gentle soap and water    Apply white petroleum/Vaseline  to wound after it is clean    Replace the Bandaid /bandage to keep the wound covered for the first few days or as instructed by your doctor    If you had a scalp biopsy, warm shower water to the area on a daily basis should suffice    What should I use to clean my wound?     Cotton-tipped applicators (Qtips )    White petroleum jelly (Vaseline ). Use a clean new container and use Q-tips to apply.    Bandaids   as needed    Gentle soap     How should I care for my wound long term?    Do not get your wound dirty    Keep up with wound care for one week or until the area is healed.    A small scab will form and fall off by itself when the area is completely healed. The area will be red and will become pink in color as it heals. Sun protection is very important for how your scar will turn out. Sunscreen with an SPF 30 or greater is recommended once the area is healed.    If you have stitches, stitches need to be removed in  days. You may return to our clinic for this or you may have it done locally at your doctor s office.    You should have some soreness but it should be mild and slowly go away over several days. Talk to your doctor about using tylenol for pain,    When should I call my doctor?  If you have increased:     Pain or swelling    Pus or drainage (clear or slightly yellow drainage is ok)    Temperature over 100F    Spreading redness or warmth around wound    When will I hear about my results?  The biopsy results can take 2 weeks to come back.  Your results will automatically release to [employer] before your provider has even reviewed them.  The clinic will call you with the results, send you a [facility] message, or have you schedule a follow-up clinic or phone time to discuss the results.  Contact our clinics if you do not hear from us in 2 weeks.    Who should I call with questions?    [facility],  [facility]oli Powers: [phone]    [facility]: [phone]    For urgent needs outside of business hours call the [facility] at [phone] and ask for the dermatology resident on call

## 2022-02-01 NOTE — NURSING NOTE
The following medication was given:     MEDICATION:  Lidocaine with epinephrine 1% [id number]  ROUTE: SQ  SITE: see procedure note  DOSE:  2 mL  LOT #: [id number]  : [employer]  EXPIRATION DATE: 08/01/2022  NDC#: 4346-2744-94  Was there drug waste? no  Multi-dose vial: Yes    Megan Rousseau CMA  February 1, 2022

## 2022-02-01 NOTE — NURSING NOTE
"Di Donaldson's chief complaint for this visit includes:  Chief Complaint   Patient presents with     Consult     invasive ductal carcinoma of left breast     PCP: Cat Wharton    Referring Provider:  No referring provider defined for this encounter.    Ht 1.702 m (5' 7\")   Wt 96.7 kg (213 lb 1.6 oz)   BMI 33.38 kg/m    No Pain (0)     Do you need any medication refills at today's visit? No    Megan Rousseau CMA        "

## 2022-02-02 ENCOUNTER — TELEPHONE (OUTPATIENT)
Dept: SURGERY | Facility: CLINIC | Age: 77
End: 2022-02-02

## 2022-02-02 ENCOUNTER — HOSPITAL ENCOUNTER (OUTPATIENT)
Dept: CARDIOLOGY | Facility: CLINIC | Age: 77
Discharge: HOME | End: 2022-02-02
Attending: INTERNAL MEDICINE | Admitting: INTERNAL MEDICINE
Payer: COMMERCIAL

## 2022-02-02 ENCOUNTER — OFFICE VISIT (OUTPATIENT)
Dept: SURGERY | Facility: CLINIC | Age: 77
End: 2022-02-02
Payer: COMMERCIAL

## 2022-02-02 VITALS
SYSTOLIC BLOOD PRESSURE: 130 MMHG | WEIGHT: 212 LBS | TEMPERATURE: 97.4 F | HEIGHT: 67 IN | BODY MASS INDEX: 33.27 KG/M2 | DIASTOLIC BLOOD PRESSURE: 70 MMHG

## 2022-02-02 DIAGNOSIS — Z17.1 MALIGNANT NEOPLASM OF OVERLAPPING SITES OF LEFT BREAST IN FEMALE, ESTROGEN RECEPTOR NEGATIVE (H): ICD-10-CM

## 2022-02-02 DIAGNOSIS — Z17.1 MALIGNANT NEOPLASM OF CENTRAL PORTION OF LEFT BREAST IN FEMALE, ESTROGEN RECEPTOR NEGATIVE (H): Primary | ICD-10-CM

## 2022-02-02 DIAGNOSIS — C50.812 MALIGNANT NEOPLASM OF OVERLAPPING SITES OF LEFT BREAST IN FEMALE, ESTROGEN RECEPTOR NEGATIVE (H): ICD-10-CM

## 2022-02-02 DIAGNOSIS — C50.112 MALIGNANT NEOPLASM OF CENTRAL PORTION OF LEFT BREAST IN FEMALE, ESTROGEN RECEPTOR NEGATIVE (H): Primary | ICD-10-CM

## 2022-02-02 DIAGNOSIS — T45.1X5A CHEMOTHERAPY INDUCED CARDIOMYOPATHY (H): ICD-10-CM

## 2022-02-02 DIAGNOSIS — I42.7 CHEMOTHERAPY INDUCED CARDIOMYOPATHY (H): ICD-10-CM

## 2022-02-02 DIAGNOSIS — Z11.59 ENCOUNTER FOR SCREENING FOR OTHER VIRAL DISEASES: Primary | ICD-10-CM

## 2022-02-02 LAB — LVEF ECHO: NORMAL

## 2022-02-02 PROCEDURE — 99203 OFFICE O/P NEW LOW 30 MIN: CPT | Performed by: SURGERY

## 2022-02-02 PROCEDURE — 93306 TTE W/DOPPLER COMPLETE: CPT | Mod: 26 | Performed by: INTERNAL MEDICINE

## 2022-02-02 PROCEDURE — 93306 TTE W/DOPPLER COMPLETE: CPT

## 2022-02-02 RX ORDER — CEFAZOLIN SODIUM 2 G/100ML
2 INJECTION, SOLUTION INTRAVENOUS
Status: CANCELLED | OUTPATIENT
Start: 2022-02-02

## 2022-02-02 RX ORDER — CEFAZOLIN SODIUM 2 G/100ML
2 INJECTION, SOLUTION INTRAVENOUS SEE ADMIN INSTRUCTIONS
Status: CANCELLED | OUTPATIENT
Start: 2022-02-02

## 2022-02-02 ASSESSMENT — MIFFLIN-ST. JEOR: SCORE: 1479.26

## 2022-02-02 NOTE — TELEPHONE ENCOUNTER
Type of surgery: ultrasound guided right internal jugular venous access port placement with flouroscopy    Location of surgery: [facility]  Date and time of surgery: 2/7  Surgeon: Hugh  Pre-Op Appt Date: MD will do   Post-Op Appt Date: NA   Packet sent out: Not Applicable  Pre-cert/Authorization completed:  Not Applicable  Date: na

## 2022-02-02 NOTE — PROGRESS NOTES
Patient seen in consultation for port placement    HPI:  Patient is a 76 year old female with left sided breast CA in need of hitesh-adjuvant chemotherapy. She is here to discuss port placement. She has not had any surgery to the anterior head or neck area. No trauma to the clavicle. Not taking blood thinning medications.    Review Of Systems    Skin: negative  Ears/Nose/Throat: negative  Respiratory: No shortness of breath, dyspnea on exertion, cough, or hemoptysis  Cardiovascular: negative  Gastrointestinal: negative  Genitourinary: negative  Musculoskeletal: negative  Neurologic: negative  Hematologic/Lymphatic/Immunologic: negative  Endocrine: negative        Past Medical History:   Diagnosis Date     hypertension      Hypothyroidism      Type 2 diabetes mellitus (H)        No past surgical history on file.    No family history on file.    Social History     Socioeconomic History     Marital status: Single     Spouse name: Not on file     Number of children: Not on file     Years of education: Not on file     Highest education level: Not on file   Occupational History     Not on file   Tobacco Use     Smoking status: Never Smoker     Smokeless tobacco: Never Used   Substance and Sexual Activity     Alcohol use: Not on file     Drug use: Not on file     Sexual activity: Not on file   Other Topics Concern     Not on file   Social History Narrative     Not on file     Social Determinants of Health     Financial Resource Strain: Not on file   Food Insecurity: Not on file   Transportation Needs: Not on file   Physical Activity: Not on file   Stress: Not on file   Social Connections: Not on file   Intimate Partner Violence: Not on file   Housing Stability: Not on file       Current Outpatient Medications   Medication Sig Dispense Refill     baclofen (LIORESAL) 10 MG tablet Take 10 mg by mouth       colestipol (COLESTID) 1 g tablet Take 1 g by mouth 2 times daily        gabapentin (NEURONTIN) 600 MG tablet Take 600 mg by  "mouth 3 times daily        insulin glargine (BASAGLAR KWIKPEN) 100 UNIT/ML pen Inject 40 Units Subcutaneous       insulin lispro (HUMALOG KWIKPEN) 100 UNIT/ML (1 unit dial) KWIKPEN Inject Subcutaneous 3 times daily (before meals)       levothyroxine (SYNTHROID/LEVOTHROID) 112 MCG tablet Take 112 mcg by mouth       lisinopril (ZESTRIL) 40 MG tablet Take 20 mg by mouth       magnesium 100 MG CAPS Take 400 mg by mouth 2 times daily        triamcinolone (KENALOG) 0.1 % external ointment APPLY TO AFFECTED AREA EVERY NIGHT AT BEDTIME         Medications and history reviewed    Physical exam:  Vitals: /70   Temp 97.4  F (36.3  C) (Temporal)   Ht 1.702 m (5' 7\")   Wt 96.2 kg (212 lb)   BMI 33.20 kg/m    BMI= Body mass index is 33.2 kg/m .    Constitutional: Healthy, alert, non-distressed   Head: Normo-cephalic, atraumatic, no lesions, masses or tenderness   Cardiovascular: RRR, no new murmurs, +S1, +S2 heart sounds, no clicks, rubs or gallops   Respiratory: CTAB, no rales, rhonchi or wheezing, equal chest rise, good respiratory effort   Gastrointestinal: Soft, non-tender, non distended, no rebound rigidity or guarding, no masses or hernias palpated   : Deferred  Musculoskeletal: Moves all extremities, normal  strength, no deformities noted   Skin: No suspicious lesions or rashes   Psychiatric: Mentation appears normal, affect appropriate   Hematologic/Lymphatic/Immunologic: Normal cervical and supraclavicular lymph nodes   Patient able to get up on table without difficulty.    Labs show:  No results found for this or any previous visit (from the past 24 hour(s)).    Imaging shows:  Recent Results (from the past 744 hour(s))   Echocardiogram Complete   Result Value    LVEF  60-65%    Narrative    [id number]  [id number]  [id number]  [id number]^MARGY^LENNY^QUE     [facility]  Echocardiography Laboratory  [address] [place] [address]     Name: RANJAN GAMA  MRN: [id number]  : " 1945  Study Date: 02/02/2022 10:58 AM  Age: 76 yrs  Gender: Female  Patient Location: [facility]  Reason For Study: Malignant neoplasm of overlapping sites of left breast in  female  History: Cancer  Ordering Physician: LENNY JI  Referring Physician: LENNY JI  Performed By: Viki Drummond     BSA: 2.1 m2  Height: 67 in  Weight: 217 lb  HR: 62  BP: 130/70 mmHg  ______________________________________________________________________________  Procedure  Complete Echo Adult. Myocardial Strain Imaging performed.  ______________________________________________________________________________  Interpretation Summary     The left ventricle is normal in size.  Left ventricular systolic function is normal.  The visual ejection fraction is 60-65%.  Global peak LV longitudinal strain is averaged at -18.1%. This is within  reported normal limits (normal <-18%).  Mild valvular aortic stenosis.  There is no comparison study available.  ______________________________________________________________________________  Left Ventricle  The left ventricle is normal in size. There is normal left ventricular wall  thickness. Left ventricular systolic function is normal. The visual ejection  fraction is 60-65%. Grade I or early diastolic dysfunction. Global peak LV  longitudinal strain is averaged at -18.1%. This is within reported normal  limits (normal <-18%). Diastolic Doppler findings (E/E' ratio and/or other  parameters) suggest left ventricular filling pressures are normal. Normal left  ventricular wall motion.     Right Ventricle  The right ventricle is normal in structure, function and size.     Atria  Normal left atrial size. Right atrial size is normal. There is no atrial shunt  seen.     Mitral Valve  The mitral valve is normal in structure and function. There is trace mitral  regurgitation.     Tricuspid Valve  The tricuspid valve is normal in structure and function. Right ventricle  systolic pressure  estimate normal. There is trace tricuspid regurgitation. The  right ventricular systolic pressure is approximated at 20.9 mmHg plus the  right atrial pressure. IVC diameter <2.1 cm collapsing >50% with sniff  suggests a normal RA pressure of 3 mmHg.     Aortic Valve  No aortic regurgitation is present. The calculated aortic valve are is 1.6  cm^2. The peak AoV pressure gradient is 23.0 mmHg. The mean AoV pressure  gradient is 12.5 mmHg. Mild valvular aortic stenosis.     Pulmonic Valve  The pulmonic valve is not well seen, but is grossly normal. There is trace  pulmonic valvular regurgitation.     Vessels  Normal size aorta.     Pericardium  There is no pericardial effusion.     Rhythm  Sinus rhythm was noted.  ______________________________________________________________________________  MMode/2D Measurements & Calculations  IVSd: 1.1 cm     LVIDd: 3.4 cm  LVIDs: 2.5 cm  LVPWd: 1.1 cm  FS: 27.0 %  LV mass(C)d: 112.0 grams  LV mass(C)dI: 53.5 grams/m2  Ao root diam: 2.7 cm  LA dimension: 4.0 cm  asc Aorta Diam: 3.1 cm  LA/Ao: 1.4  LVOT diam: 2.1 cm  LVOT area: 3.3 cm2  LA Volume (BP): 45.3 ml  LA Volume Index (BP): 21.7 ml/m2  RWT: 0.63     Doppler Measurements & Calculations  MV E max scar: 63.8 cm/sec  MV A max scar: 77.8 cm/sec  MV E/A: 0.82  LV IVRT: 0.10 sec  MV dec slope: 255.0 cm/sec2  MV dec time: 0.25 sec  Ao V2 max: 239.5 cm/sec  Ao max P.0 mmHg  Ao V2 mean: 165.4 cm/sec  Ao mean P.5 mmHg  Ao V2 VTI: 54.7 cm  SHEREE(I,D): 1.6 cm2  SHEREE(V,D): 1.5 cm2  LV V1 max P.8 mmHg  LV V1 max: 110.1 cm/sec  LV V1 VTI: 25.9 cm  SV(LVOT): 85.4 ml  SI(LVOT): 40.8 ml/m2  PA acc time: 0.16 sec  PI end-d scar: 77.4 cm/sec  TR max scar: 228.8 cm/sec  TR max P.9 mmHg  AV Scar Ratio (DI): 0.46  SHEREE Index (cm2/m2): 0.75  E/E' av.5  Lateral E/e': 8.1  Medial E/e': 8.9     ______________________________________________________________________________  Report approved by: Andres Umaña 2022 02:25 PM                Assessment:     ICD-10-CM    1. Malignant neoplasm of central portion of left breast in female, estrogen receptor negative (H)  C50.112 Case Request: ultrasound guided right internal jugular venous access port placement with flouroscopy    Z17.1 Case Request: ultrasound guided right internal jugular venous access port placement with flouroscopy     Plan: I recommend US RIJ VAP with flouro. We discussed the procedure in detail. We also discussed the risks, benefits, alternatives and post-op care and restrictions. After our informed discussion we decided to proceed with the proposed surgery.    Rolando Reese, DO

## 2022-02-02 NOTE — TELEPHONE ENCOUNTER
Covid test will be done at [facility] per granddaughter.  I have OR fax # for results.

## 2022-02-02 NOTE — LETTER
2/2/2022         RE: Di Donaldson  [id number]  [place] [id number]        Dear Colleague,    Thank you for referring your patient, Di Donaldson, to the [facility]. Please see a copy of my visit note below.    Patient seen in consultation for port placement    HPI:  Patient is a 76 year old female with left sided breast CA in need of hitesh-adjuvant chemotherapy. She is here to discuss port placement. She has not had any surgery to the anterior head or neck area. No trauma to the clavicle. Not taking blood thinning medications.    Review Of Systems    Skin: negative  Ears/Nose/Throat: negative  Respiratory: No shortness of breath, dyspnea on exertion, cough, or hemoptysis  Cardiovascular: negative  Gastrointestinal: negative  Genitourinary: negative  Musculoskeletal: negative  Neurologic: negative  Hematologic/Lymphatic/Immunologic: negative  Endocrine: negative        Past Medical History:   Diagnosis Date     hypertension      Hypothyroidism      Type 2 diabetes mellitus (H)        No past surgical history on file.    No family history on file.    Social History     Socioeconomic History     Marital status: Single     Spouse name: Not on file     Number of children: Not on file     Years of education: Not on file     Highest education level: Not on file   Occupational History     Not on file   Tobacco Use     Smoking status: Never Smoker     Smokeless tobacco: Never Used   Substance and Sexual Activity     Alcohol use: Not on file     Drug use: Not on file     Sexual activity: Not on file   Other Topics Concern     Not on file   Social History Narrative     Not on file     Social Determinants of Health     Financial Resource Strain: Not on file   Food Insecurity: Not on file   Transportation Needs: Not on file   Physical Activity: Not on file   Stress: Not on file   Social Connections: Not on file   Intimate Partner Violence: Not on file   Housing Stability: Not on file  "      Current Outpatient Medications   Medication Sig Dispense Refill     baclofen (LIORESAL) 10 MG tablet Take 10 mg by mouth       colestipol (COLESTID) 1 g tablet Take 1 g by mouth 2 times daily        gabapentin (NEURONTIN) 600 MG tablet Take 600 mg by mouth 3 times daily        insulin glargine (BASAGLAR KWIKPEN) 100 UNIT/ML pen Inject 40 Units Subcutaneous       insulin lispro (HUMALOG KWIKPEN) 100 UNIT/ML (1 unit dial) KWIKPEN Inject Subcutaneous 3 times daily (before meals)       levothyroxine (SYNTHROID/LEVOTHROID) 112 MCG tablet Take 112 mcg by mouth       lisinopril (ZESTRIL) 40 MG tablet Take 20 mg by mouth       magnesium 100 MG CAPS Take 400 mg by mouth 2 times daily        triamcinolone (KENALOG) 0.1 % external ointment APPLY TO AFFECTED AREA EVERY NIGHT AT BEDTIME         Medications and history reviewed    Physical exam:  Vitals: /70   Temp 97.4  F (36.3  C) (Temporal)   Ht 1.702 m (5' 7\")   Wt 96.2 kg (212 lb)   BMI 33.20 kg/m    BMI= Body mass index is 33.2 kg/m .    Constitutional: Healthy, alert, non-distressed   Head: Normo-cephalic, atraumatic, no lesions, masses or tenderness   Cardiovascular: RRR, no new murmurs, +S1, +S2 heart sounds, no clicks, rubs or gallops   Respiratory: CTAB, no rales, rhonchi or wheezing, equal chest rise, good respiratory effort   Gastrointestinal: Soft, non-tender, non distended, no rebound rigidity or guarding, no masses or hernias palpated   : Deferred  Musculoskeletal: Moves all extremities, normal  strength, no deformities noted   Skin: No suspicious lesions or rashes   Psychiatric: Mentation appears normal, affect appropriate   Hematologic/Lymphatic/Immunologic: Normal cervical and supraclavicular lymph nodes   Patient able to get up on table without difficulty.    Labs show:  No results found for this or any previous visit (from the past 24 hour(s)).    Imaging shows:  Recent Results (from the past 744 hour(s))   Echocardiogram Complete "   Result Value    LVEF  60-65%    [facility]    [id number]  [id number]  [id number]^MARGY^LENNY^QUE     [facility]  Echocardiography Laboratory  [address] [place] [address]     Name: RANJAN GAMA  MRN: [id number]  : 1945  Study Date: 2022 10:58 AM  Age: 76 yrs  Gender: Female  Patient Location: [facility]  Reason For Study: Malignant neoplasm of overlapping sites of left breast in  female  History: Cancer  Ordering Physician: LENNY JI  Referring Physician: LENNY JI  Performed By: Viki Drummond     BSA: 2.1 m2  Height: 67 in  Weight: 217 lb  HR: 62  BP: 130/70 mmHg  ______________________________________________________________________________  Procedure  Complete Echo Adult. Myocardial Strain Imaging performed.  ______________________________________________________________________________  Interpretation Summary     The left ventricle is normal in size.  Left ventricular systolic function is normal.  The visual ejection fraction is 60-65%.  Global peak LV longitudinal strain is averaged at -18.1%. This is within  reported normal limits (normal <-18%).  Mild valvular aortic stenosis.  There is no comparison study available.  ______________________________________________________________________________  Left Ventricle  The left ventricle is normal in size. There is normal left ventricular wall  thickness. Left ventricular systolic function is normal. The visual ejection  fraction is 60-65%. Grade I or early diastolic dysfunction. Global peak LV  longitudinal strain is averaged at -18.1%. This is within reported normal  limits (normal <-18%). Diastolic Doppler findings (E/E' ratio and/or other  parameters) suggest left ventricular filling pressures are normal. Normal left  ventricular wall motion.     Right Ventricle  The right ventricle is normal in structure, function and size.     Atria  Normal left atrial size. Right atrial size is  normal. There is no atrial shunt  seen.     Mitral Valve  The mitral valve is normal in structure and function. There is trace mitral  regurgitation.     Tricuspid Valve  The tricuspid valve is normal in structure and function. Right ventricle  systolic pressure estimate normal. There is trace tricuspid regurgitation. The  right ventricular systolic pressure is approximated at 20.9 mmHg plus the  right atrial pressure. IVC diameter <2.1 cm collapsing >50% with sniff  suggests a normal RA pressure of 3 mmHg.     Aortic Valve  No aortic regurgitation is present. The calculated aortic valve are is 1.6  cm^2. The peak AoV pressure gradient is 23.0 mmHg. The mean AoV pressure  gradient is 12.5 mmHg. Mild valvular aortic stenosis.     Pulmonic Valve  The pulmonic valve is not well seen, but is grossly normal. There is trace  pulmonic valvular regurgitation.     Vessels  Normal size aorta.     Pericardium  There is no pericardial effusion.     Rhythm  Sinus rhythm was noted.  ______________________________________________________________________________  MMode/2D Measurements & Calculations  IVSd: 1.1 cm     LVIDd: 3.4 cm  LVIDs: 2.5 cm  LVPWd: 1.1 cm  FS: 27.0 %  LV mass(C)d: 112.0 grams  LV mass(C)dI: 53.5 grams/m2  Ao root diam: 2.7 cm  LA dimension: 4.0 cm  asc Aorta Diam: 3.1 cm  LA/Ao: 1.4  LVOT diam: 2.1 cm  LVOT area: 3.3 cm2  LA Volume (BP): 45.3 ml  LA Volume Index (BP): 21.7 ml/m2  RWT: 0.63     Doppler Measurements & Calculations  MV E max kandis: 63.8 cm/sec  MV A max kandis: 77.8 cm/sec  MV E/A: 0.82  LV IVRT: 0.10 sec  MV dec slope: 255.0 cm/sec2  MV dec time: 0.25 sec  Ao V2 max: 239.5 cm/sec  Ao max P.0 mmHg  Ao V2 mean: 165.4 cm/sec  Ao mean P.5 mmHg  Ao V2 VTI: 54.7 cm  SHEREE(I,D): 1.6 cm2  SHEREE(V,D): 1.5 cm2  LV V1 max P.8 mmHg  LV V1 max: 110.1 cm/sec  LV V1 VTI: 25.9 cm  SV(LVOT): 85.4 ml  SI(LVOT): 40.8 ml/m2  PA acc time: 0.16 sec  PI end-d kandis: 77.4 cm/sec  TR max kandis: 228.8 cm/sec  TR max PG:  20.9 mmHg  AV Scar Ratio (DI): 0.46  SHEREE Index (cm2/m2): 0.75  E/E' av.5  Lateral E/e': 8.1  Medial E/e': 8.9     ______________________________________________________________________________  Report approved by: Andres Umaña 2022 02:25 PM               Assessment:     ICD-10-CM    1. Malignant neoplasm of central portion of left breast in female, estrogen receptor negative (H)  C50.112 Case Request: ultrasound guided right internal jugular venous access port placement with flouroscopy    Z17.1 Case Request: ultrasound guided right internal jugular venous access port placement with flouroscopy     Plan: I recommend US RIJ VAP with flouro. We discussed the procedure in detail. We also discussed the risks, benefits, alternatives and post-op care and restrictions. After our informed discussion we decided to proceed with the proposed surgery.    Rolando Reese DO        Again, thank you for allowing me to participate in the care of your patient.        Sincerely,        Rolando Reese DO

## 2022-02-04 LAB
PATH REPORT.COMMENTS IMP SPEC: NORMAL
PATH REPORT.COMMENTS IMP SPEC: NORMAL
PATH REPORT.FINAL DX SPEC: NORMAL
PATH REPORT.GROSS SPEC: NORMAL
PATH REPORT.MICROSCOPIC SPEC OTHER STN: NORMAL
PATH REPORT.RELEVANT HX SPEC: NORMAL
PHOTO IMAGE: NORMAL

## 2022-02-06 ENCOUNTER — HEALTH MAINTENANCE LETTER (OUTPATIENT)
Age: 77
End: 2022-02-06

## 2022-02-07 ENCOUNTER — HOSPITAL ENCOUNTER (OUTPATIENT)
Dept: GENERAL RADIOLOGY | Facility: CLINIC | Age: 77
End: 2022-02-07
Attending: SURGERY | Admitting: SURGERY
Payer: COMMERCIAL

## 2022-02-07 ENCOUNTER — ANESTHESIA EVENT (OUTPATIENT)
Dept: SURGERY | Facility: CLINIC | Age: 77
End: 2022-02-07
Payer: COMMERCIAL

## 2022-02-07 ENCOUNTER — ANESTHESIA (OUTPATIENT)
Dept: SURGERY | Facility: CLINIC | Age: 77
End: 2022-02-07
Payer: COMMERCIAL

## 2022-02-07 ENCOUNTER — APPOINTMENT (OUTPATIENT)
Dept: LAB | Facility: CLINIC | Age: 77
End: 2022-02-07
Payer: COMMERCIAL

## 2022-02-07 ENCOUNTER — APPOINTMENT (OUTPATIENT)
Dept: GENERAL RADIOLOGY | Facility: CLINIC | Age: 77
End: 2022-02-07
Attending: SURGERY
Payer: COMMERCIAL

## 2022-02-07 ENCOUNTER — HOSPITAL ENCOUNTER (OUTPATIENT)
Facility: CLINIC | Age: 77
Discharge: HOME | End: 2022-02-07
Attending: SURGERY | Admitting: SURGERY
Payer: COMMERCIAL

## 2022-02-07 VITALS
OXYGEN SATURATION: 99 % | DIASTOLIC BLOOD PRESSURE: 78 MMHG | SYSTOLIC BLOOD PRESSURE: 137 MMHG | BODY MASS INDEX: 33.27 KG/M2 | HEIGHT: 67 IN | WEIGHT: 212 LBS | RESPIRATION RATE: 18 BRPM | HEART RATE: 75 BPM | TEMPERATURE: 97 F

## 2022-02-07 DIAGNOSIS — C50.412 MALIGNANT NEOPLASM OF UPPER-OUTER QUADRANT OF LEFT BREAST IN FEMALE, ESTROGEN RECEPTOR NEGATIVE (H): Primary | ICD-10-CM

## 2022-02-07 DIAGNOSIS — C50.112 MALIGNANT NEOPLASM OF CENTRAL PORTION OF LEFT BREAST (H): ICD-10-CM

## 2022-02-07 DIAGNOSIS — Z17.1 MALIGNANT NEOPLASM OF UPPER-OUTER QUADRANT OF LEFT BREAST IN FEMALE, ESTROGEN RECEPTOR NEGATIVE (H): Primary | ICD-10-CM

## 2022-02-07 LAB
ALBUMIN SERPL-MCNC: 3.7 G/DL (ref 3.4–5)
ALP SERPL-CCNC: 90 U/L (ref 40–150)
ALT SERPL W P-5'-P-CCNC: 21 U/L (ref 0–50)
AST SERPL W P-5'-P-CCNC: 13 U/L (ref 0–45)
BASOPHILS # BLD AUTO: 0.1 10E3/UL (ref 0–0.2)
BASOPHILS NFR BLD AUTO: 1 %
BILIRUB DIRECT SERPL-MCNC: 0.1 MG/DL (ref 0–0.2)
BILIRUB SERPL-MCNC: 0.5 MG/DL (ref 0.2–1.3)
EOSINOPHIL # BLD AUTO: 0.3 10E3/UL (ref 0–0.7)
EOSINOPHIL NFR BLD AUTO: 4 %
ERYTHROCYTE [DISTWIDTH] IN BLOOD BY AUTOMATED COUNT: 12.9 % (ref 10–15)
HCT VFR BLD AUTO: 38.5 % (ref 35–47)
HGB BLD-MCNC: 12.6 G/DL (ref 11.7–15.7)
IMM GRANULOCYTES # BLD: 0 10E3/UL
IMM GRANULOCYTES NFR BLD: 0 %
LYMPHOCYTES # BLD AUTO: 1 10E3/UL (ref 0.8–5.3)
LYMPHOCYTES NFR BLD AUTO: 13 %
MCH RBC QN AUTO: 29.6 PG (ref 26.5–33)
MCHC RBC AUTO-ENTMCNC: 32.7 G/DL (ref 31.5–36.5)
MCV RBC AUTO: 91 FL (ref 78–100)
MONOCYTES # BLD AUTO: 0.4 10E3/UL (ref 0–1.3)
MONOCYTES NFR BLD AUTO: 5 %
NEUTROPHILS # BLD AUTO: 5.9 10E3/UL (ref 1.6–8.3)
NEUTROPHILS NFR BLD AUTO: 77 %
NRBC # BLD AUTO: 0 10E3/UL
NRBC BLD AUTO-RTO: 0 /100
PLATELET # BLD AUTO: 177 10E3/UL (ref 150–450)
PROT SERPL-MCNC: 6.9 G/DL (ref 6.8–8.8)
RBC # BLD AUTO: 4.25 10E6/UL (ref 3.8–5.2)
WBC # BLD AUTO: 7.8 10E3/UL (ref 4–11)

## 2022-02-07 PROCEDURE — 999N000141 HC STATISTIC PRE-PROCEDURE NURSING ASSESSMENT: Performed by: SURGERY

## 2022-02-07 PROCEDURE — 77001 FLUOROGUIDE FOR VEIN DEVICE: CPT | Mod: 26 | Performed by: SURGERY

## 2022-02-07 PROCEDURE — 250N000011 HC RX IP 250 OP 636: Performed by: SURGERY

## 2022-02-07 PROCEDURE — C1788 PORT, INDWELLING, IMP: HCPCS | Performed by: SURGERY

## 2022-02-07 PROCEDURE — 710N000012 HC RECOVERY PHASE 2, PER MINUTE: Performed by: SURGERY

## 2022-02-07 PROCEDURE — 250N000011 HC RX IP 250 OP 636: Performed by: NURSE ANESTHETIST, CERTIFIED REGISTERED

## 2022-02-07 PROCEDURE — 271N000001 HC OR GENERAL SUPPLY NON-STERILE: Performed by: SURGERY

## 2022-02-07 PROCEDURE — 999N000179 XR SURGERY CARM FLUORO LESS THAN 5 MIN W STILLS

## 2022-02-07 PROCEDURE — 999N000063 XR CHEST PORT 1 VIEW

## 2022-02-07 PROCEDURE — 36415 COLL VENOUS BLD VENIPUNCTURE: CPT | Performed by: INTERNAL MEDICINE

## 2022-02-07 PROCEDURE — 250N000009 HC RX 250: Performed by: SURGERY

## 2022-02-07 PROCEDURE — 258N000003 HC RX IP 258 OP 636: Performed by: SURGERY

## 2022-02-07 PROCEDURE — 250N000009 HC RX 250: Performed by: NURSE ANESTHETIST, CERTIFIED REGISTERED

## 2022-02-07 PROCEDURE — 258N000003 HC RX IP 258 OP 636: Performed by: NURSE ANESTHETIST, CERTIFIED REGISTERED

## 2022-02-07 PROCEDURE — 80076 HEPATIC FUNCTION PANEL: CPT | Performed by: INTERNAL MEDICINE

## 2022-02-07 PROCEDURE — 360N000082 HC SURGERY LEVEL 2 W/ FLUORO, PER MIN: Performed by: SURGERY

## 2022-02-07 PROCEDURE — 85025 COMPLETE CBC W/AUTO DIFF WBC: CPT | Performed by: INTERNAL MEDICINE

## 2022-02-07 PROCEDURE — 272N000001 HC OR GENERAL SUPPLY STERILE: Performed by: SURGERY

## 2022-02-07 PROCEDURE — 36561 INSERT TUNNELED CV CATH: CPT | Performed by: SURGERY

## 2022-02-07 PROCEDURE — 370N000017 HC ANESTHESIA TECHNICAL FEE, PER MIN: Performed by: SURGERY

## 2022-02-07 DEVICE — CATH PORT POWERPORT 8FR SL W/SUTURE PLUGS 1708000: Type: IMPLANTABLE DEVICE | Site: NECK | Status: FUNCTIONAL

## 2022-02-07 RX ORDER — SODIUM CHLORIDE, SODIUM LACTATE, POTASSIUM CHLORIDE, CALCIUM CHLORIDE 600; 310; 30; 20 MG/100ML; MG/100ML; MG/100ML; MG/100ML
INJECTION, SOLUTION INTRAVENOUS CONTINUOUS
Status: DISCONTINUED | OUTPATIENT
Start: 2022-02-07 | End: 2022-02-07 | Stop reason: HOSPADM

## 2022-02-07 RX ORDER — HEPARIN SODIUM (PORCINE) LOCK FLUSH IV SOLN 100 UNIT/ML 100 UNIT/ML
SOLUTION INTRAVENOUS PRN
Status: DISCONTINUED | OUTPATIENT
Start: 2022-02-07 | End: 2022-02-07 | Stop reason: HOSPADM

## 2022-02-07 RX ORDER — ONDANSETRON 2 MG/ML
4 INJECTION INTRAMUSCULAR; INTRAVENOUS EVERY 30 MIN PRN
Status: DISCONTINUED | OUTPATIENT
Start: 2022-02-07 | End: 2022-02-07 | Stop reason: HOSPADM

## 2022-02-07 RX ORDER — PROPOFOL 10 MG/ML
INJECTION, EMULSION INTRAVENOUS CONTINUOUS PRN
Status: DISCONTINUED | OUTPATIENT
Start: 2022-02-07 | End: 2022-02-07

## 2022-02-07 RX ORDER — ALBUTEROL SULFATE 0.83 MG/ML
2.5 SOLUTION RESPIRATORY (INHALATION) EVERY 4 HOURS PRN
Status: DISCONTINUED | OUTPATIENT
Start: 2022-02-07 | End: 2022-02-07 | Stop reason: HOSPADM

## 2022-02-07 RX ORDER — FENTANYL CITRATE 50 UG/ML
INJECTION, SOLUTION INTRAMUSCULAR; INTRAVENOUS PRN
Status: DISCONTINUED | OUTPATIENT
Start: 2022-02-07 | End: 2022-02-07

## 2022-02-07 RX ORDER — CEFAZOLIN SODIUM 2 G/100ML
2 INJECTION, SOLUTION INTRAVENOUS
Status: COMPLETED | OUTPATIENT
Start: 2022-02-07 | End: 2022-02-07

## 2022-02-07 RX ORDER — BUPIVACAINE HYDROCHLORIDE AND EPINEPHRINE 5; 5 MG/ML; UG/ML
INJECTION, SOLUTION EPIDURAL; INTRACAUDAL; PERINEURAL PRN
Status: DISCONTINUED | OUTPATIENT
Start: 2022-02-07 | End: 2022-02-07 | Stop reason: HOSPADM

## 2022-02-07 RX ORDER — LIDOCAINE HYDROCHLORIDE 20 MG/ML
INJECTION, SOLUTION INFILTRATION; PERINEURAL PRN
Status: DISCONTINUED | OUTPATIENT
Start: 2022-02-07 | End: 2022-02-07

## 2022-02-07 RX ORDER — ONDANSETRON 4 MG/1
4 TABLET, ORALLY DISINTEGRATING ORAL EVERY 30 MIN PRN
Status: DISCONTINUED | OUTPATIENT
Start: 2022-02-07 | End: 2022-02-07 | Stop reason: HOSPADM

## 2022-02-07 RX ORDER — PROPOFOL 10 MG/ML
INJECTION, EMULSION INTRAVENOUS PRN
Status: DISCONTINUED | OUTPATIENT
Start: 2022-02-07 | End: 2022-02-07

## 2022-02-07 RX ORDER — ONDANSETRON 2 MG/ML
INJECTION INTRAMUSCULAR; INTRAVENOUS PRN
Status: DISCONTINUED | OUTPATIENT
Start: 2022-02-07 | End: 2022-02-07

## 2022-02-07 RX ORDER — PROCHLORPERAZINE MALEATE 10 MG
10 TABLET ORAL EVERY 6 HOURS PRN
Qty: 30 TABLET | Refills: 3 | Status: SHIPPED | OUTPATIENT
Start: 2022-02-07 | End: 2022-06-17

## 2022-02-07 RX ORDER — LIDOCAINE 40 MG/G
CREAM TOPICAL
Status: DISCONTINUED | OUTPATIENT
Start: 2022-02-07 | End: 2022-02-07 | Stop reason: HOSPADM

## 2022-02-07 RX ORDER — FENTANYL CITRATE 50 UG/ML
25 INJECTION, SOLUTION INTRAMUSCULAR; INTRAVENOUS
Status: DISCONTINUED | OUTPATIENT
Start: 2022-02-07 | End: 2022-02-07 | Stop reason: HOSPADM

## 2022-02-07 RX ORDER — CEFAZOLIN SODIUM 2 G/100ML
2 INJECTION, SOLUTION INTRAVENOUS SEE ADMIN INSTRUCTIONS
Status: DISCONTINUED | OUTPATIENT
Start: 2022-02-07 | End: 2022-02-07 | Stop reason: HOSPADM

## 2022-02-07 RX ADMIN — PROPOFOL 150 MCG/KG/MIN: 10 INJECTION, EMULSION INTRAVENOUS at 10:16

## 2022-02-07 RX ADMIN — ONDANSETRON 4 MG: 2 INJECTION INTRAMUSCULAR; INTRAVENOUS at 10:50

## 2022-02-07 RX ADMIN — CEFAZOLIN SODIUM 2 G: 2 INJECTION, SOLUTION INTRAVENOUS at 09:58

## 2022-02-07 RX ADMIN — FENTANYL CITRATE 25 MCG: 50 INJECTION, SOLUTION INTRAMUSCULAR; INTRAVENOUS at 10:25

## 2022-02-07 RX ADMIN — MIDAZOLAM 1 MG: 1 INJECTION INTRAMUSCULAR; INTRAVENOUS at 10:12

## 2022-02-07 RX ADMIN — LIDOCAINE HYDROCHLORIDE 80 MG: 20 INJECTION, SOLUTION INFILTRATION; PERINEURAL at 10:15

## 2022-02-07 RX ADMIN — SODIUM CHLORIDE, POTASSIUM CHLORIDE, SODIUM LACTATE AND CALCIUM CHLORIDE: 600; 310; 30; 20 INJECTION, SOLUTION INTRAVENOUS at 09:25

## 2022-02-07 RX ADMIN — FENTANYL CITRATE 25 MCG: 50 INJECTION, SOLUTION INTRAMUSCULAR; INTRAVENOUS at 10:30

## 2022-02-07 RX ADMIN — PROPOFOL 30 MG: 10 INJECTION, EMULSION INTRAVENOUS at 10:16

## 2022-02-07 ASSESSMENT — MIFFLIN-ST. JEOR: SCORE: 1479.26

## 2022-02-07 NOTE — ANESTHESIA POSTPROCEDURE EVALUATION
Patient: Di Donaldson    Procedure: Procedure(s):  ultrasound guided right internal jugular venous access port placement with flouroscopy       Diagnosis:Malignant neoplasm of central portion of left breast in female, estrogen receptor negative (H) [C50.112, Z17.1]  Diagnosis Additional Information: No value filed.    Anesthesia Type:  MAC    Note:  Disposition: Outpatient   Postop Pain Control: Uneventful            Sign Out: Well controlled pain   PONV: No   Neuro/Psych: Uneventful            Sign Out: Acceptable/Baseline neuro status   Airway/Respiratory: Uneventful            Sign Out: Acceptable/Baseline resp. status   CV/Hemodynamics: Uneventful            Sign Out: Acceptable CV status   Other NRE: NONE   DID A NON-ROUTINE EVENT OCCUR? No    Event details/Postop Comments:  Pt was happy with anesthesia care.  No complications.  I will follow up with the pt if needed.           Last vitals:  Vitals Value Taken Time   BP     Temp     Pulse     Resp     SpO2         Electronically Signed By: CLARENCE Menjivar CRNA  February 7, 2022  11:24 AM

## 2022-02-07 NOTE — ANESTHESIA CARE TRANSFER NOTE
Patient: Di Donaldson    Procedure: Procedure(s):  ultrasound guided right internal jugular venous access port placement with flouroscopy       Diagnosis: Malignant neoplasm of central portion of left breast in female, estrogen receptor negative (H) [C50.112, Z17.1]  Diagnosis Additional Information: No value filed.    Anesthesia Type:   MAC     Note:    Oropharynx: oropharynx clear of all foreign objects and spontaneously breathing  Level of Consciousness: awake      Independent Airway: airway patency satisfactory and stable  Dentition: dentition unchanged  Vital Signs Stable: post-procedure vital signs reviewed and stable  Report to RN Given: handoff report given  Patient transferred to: Phase II  Comments: Pt alert and states she is comfortable, VSS.   Handoff Report: Identifed the Patient, Identified the Reponsible Provider, Reviewed the pertinent medical history, Discussed the surgical course, Reviewed Intra-OP anesthesia mangement and issues during anesthesia, Set expectations for post-procedure period and Allowed opportunity for questions and acknowledgement of understanding      Vitals:  Vitals Value Taken Time   BP     Temp     Pulse     Resp     SpO2         Electronically Signed By: CLARENCE Menjivar CRNA  February 7, 2022  11:09 AM

## 2022-02-07 NOTE — DISCHARGE INSTRUCTIONS
Plan for Tuesday Feb. 8, 2022    Arrive at [facility] at 0830 AM.  Go the the Elevator and take the elevator to First floor.  Stop at the registration desk.  You    will have a video visit at 0845 AM.  After your video visit your will go to Infusion Therapy which is in the main hospital building on 2nd floor.  Your first infusion    therapy appointment is at 1000 AM.

## 2022-02-07 NOTE — OP NOTE
Procedure Date: 02/07/2022     PROCEDURE:  Ultrasound-guided right internal jugular venous access port placement with fluoroscopy.     PREOPERATIVE DIAGNOSIS:  Breast cancer.     POSTOPERATIVE DIAGNOSIS:  Breast cancer     SURGEON:  Rolando Reese DO.     ASSISTANT:  None.     ANESTHESIA:  Monitored anesthesia care with local.     ESTIMATED BLOOD LOSS:  5 mL.     SPECIMENS:  None.     COMPLICATIONS:  None immediately apparent.     INDICATIONS FOR PROCEDURE:  Di is a 76 year old female with recent diagnosis of breast cancer. She is in need of semi-permanent venous access and was referred by her oncologist.  I recommended right sided port placement.  We discussed the procedure in detail as well as the risks, benefits and alternatives, postoperative care and restrictions.  After informed discussion, she agreed to proceed with surgery.     DESCRIPTION OF PROCEDURE:  After informed consent was obtained, the patient was brought from the preoperative holding area, to the operating room and placed in supine position.  Anesthesia was induced.  She was prepped and draped in normal sterile fashion.  Timeout was performed.  After correct patient and correct procedure were verified, we began by instilling 0.25% Marcaine with epinephrine for local anesthesia in the skin and subcutaneous tissue overlying the right internal jugular vein.  Using ultrasound guidance and a sounding needle, we accessed the right internal jugular vein.  Seldinger technique was used to place a guidewire into the internal jugular vein and fluoroscopy was used to confirm placement in the right sided venous system all the way down to the atrium of the heart.  We then turned our attention to the chest wall.  Again, the skin and subcutaneous tissue was instilled with 0.25% Marcaine with epinephrine for local anesthesia.  Transverse linear incision was made. Using electrocautery, we made a pocket for the port.  We checked the size of the pocket to  ensure the port was going to fit.  We then tunneled the catheter from the chest wall up into the neck incision.  Then, under direct fluoroscopic guidance, we placed the dilator and sheath over the guidewire using Seldinger technique.  The dilator and wire were then removed.  The catheter was threaded into the sheath until about 22 cm.  The sheath was removed.  Direct fluoroscopic guidance was used to pull back the catheter until the tip was approximately at the level of the tae.  This catheter was then cut at the chest wall.  Port was secured, hub was secured and the port was sutured into place with 2-0 Vicryl sutures using the suture plugs.  One final fluoroscopic image was taken.  Port appeared to be in good position.  We then tested the port using heparinized saline solution.  First, we aspirated without issue.  We flushed the heparin saline solution and without issue, approximately 7 mL We then used Hep-Lock solution to lock the catheter, again no issues with flushing.  The skin incision was closed with inverted interrupted 3-0 Vicryl sutures for the dermal and a running subcuticular 4-0 Monocryl suture for the skin.  Exofin dressing was applied.  At the completion of the case, the instruments, needles and sponge were accounted for, after a correct count.  The patient was then awoken from anesthesia and brought to the recovery room in stable condition.     Rolando Reese DO

## 2022-02-07 NOTE — BRIEF OP NOTE
[facility] Brief Operative Note    Pre-operative diagnosis: Malignant neoplasm of central portion of left breast in female, estrogen receptor negative (H) [C50.112, Z17.1]   Post-operative diagnosis breast cancer     Procedure: Procedure(s):  ultrasound guided right internal jugular venous access port placement with flouroscopy   Surgeon(s): Surgeon(s) and Role:     * Rolando Reese, DO - Primary   Estimated blood loss: 5ml    Specimens: * No specimens in log *   Findings: Technically successful RIJ VAP placement

## 2022-02-07 NOTE — ANESTHESIA PREPROCEDURE EVALUATION
Anesthesia Pre-Procedure Evaluation    Patient: Di Donaldson   MRN: [id number] : 1945        Preoperative Diagnosis: Malignant neoplasm of central portion of left breast in female, estrogen receptor negative (H) [C50.112, Z17.1]    Procedure : Procedure(s):  ultrasound guided right internal jugular venous access port placement with flouroscopy          Past Medical History:   Diagnosis Date     hypertension      Hypothyroidism      Type 2 diabetes mellitus (H)       History reviewed. No pertinent surgical history.   Allergies   Allergen Reactions     Hmg-Coa-R Inhibitors Unknown     Clindamycin Other (See Comments)     mild      Social History     Tobacco Use     Smoking status: Never Smoker     Smokeless tobacco: Never Used   Substance Use Topics     Alcohol use: Not on file      Wt Readings from Last 1 Encounters:   22 96.2 kg (212 lb)        Anesthesia Evaluation            ROS/MED HX  ENT/Pulmonary:  - neg pulmonary ROS     Neurologic:  - neg neurologic ROS     Cardiovascular:     (+) hypertension-----Previous cardiac testing   Echo: Date: Results:  EF 60-65%  Stress Test: Date: Results:    ECG Reviewed: Date: Results:    Cath: Date: Results:      METS/Exercise Tolerance:     Hematologic:  - neg hematologic  ROS     Musculoskeletal:  - neg musculoskeletal ROS     GI/Hepatic:  - neg GI/hepatic ROS     Renal/Genitourinary:  - neg Renal ROS     Endo:     (+) type II DM, thyroid problem, hypothyroidism,     Psychiatric/Substance Use:  - neg psychiatric ROS     Infectious Disease:       Malignancy:   (+) Malignancy, History of Breast.    Other:            Physical Exam    Airway        Mallampati: II   TM distance: > 3 FB   Neck ROM: full   Mouth opening: > 3 cm    Respiratory Devices and Support         Dental  no notable dental history         Cardiovascular   cardiovascular exam normal          Pulmonary   pulmonary exam normal                OUTSIDE LABS:  CBC: No results found for: WBC, HGB,  HCT, PLT  BMP: No results found for: NA, POTASSIUM, CHLORIDE, CO2, BUN, CR, GLC  COAGS: No results found for: PTT, INR, FIBR  POC: No results found for: BGM, HCG, HCGS  HEPATIC: No results found for: ALBUMIN, PROTTOTAL, ALT, AST, GGT, ALKPHOS, BILITOTAL, BILIDIRECT, MANUEL  OTHER: No results found for: PH, LACT, A1C, GINNY, PHOS, MAG, LIPASE, AMYLASE, TSH, T4, T3, CRP, SED    Anesthesia Plan    ASA Status:  2   NPO Status:  NPO Appropriate    Anesthesia Type: MAC.     - Reason for MAC: straight local not clinically adequate   Induction: Intravenous, Propofol.   Maintenance: TIVA.        Consents    Anesthesia Plan(s) and associated risks, benefits, and realistic alternatives discussed. Questions answered and patient/representative(s) expressed understanding.    - Discussed:     - Discussed with:  Patient      - Extended Intubation/Ventilatory Support Discussed: No.      - Patient is DNR/DNI Status: No    Use of blood products discussed: No .     Postoperative Care    Pain management: IV analgesics.   PONV prophylaxis: Ondansetron (or other 5HT-3), Dexamethasone or Solumedrol, Background Propofol Infusion     Comments:    Other Comments: The risks and benefits of anesthesia, and the alternatives where applicable, have been discussed with the patient, and they wish to proceed.            CLARENCE Menjivar CRNA

## 2022-02-08 ENCOUNTER — INFUSION THERAPY VISIT (OUTPATIENT)
Dept: INFUSION THERAPY | Facility: CLINIC | Age: 77
End: 2022-02-08
Attending: INTERNAL MEDICINE
Payer: COMMERCIAL

## 2022-02-08 ENCOUNTER — VIRTUAL VISIT (OUTPATIENT)
Dept: ONCOLOGY | Facility: CLINIC | Age: 77
End: 2022-02-08
Payer: COMMERCIAL

## 2022-02-08 VITALS
DIASTOLIC BLOOD PRESSURE: 77 MMHG | OXYGEN SATURATION: 98 % | RESPIRATION RATE: 16 BRPM | TEMPERATURE: 97.7 F | BODY MASS INDEX: 33.67 KG/M2 | HEART RATE: 82 BPM | SYSTOLIC BLOOD PRESSURE: 149 MMHG | WEIGHT: 215 LBS

## 2022-02-08 VITALS
BODY MASS INDEX: 33.74 KG/M2 | HEIGHT: 67 IN | SYSTOLIC BLOOD PRESSURE: 128 MMHG | DIASTOLIC BLOOD PRESSURE: 68 MMHG | WEIGHT: 215 LBS | TEMPERATURE: 97.8 F

## 2022-02-08 DIAGNOSIS — Z17.31 HER2-POSITIVE CARCINOMA OF LEFT BREAST (H): ICD-10-CM

## 2022-02-08 DIAGNOSIS — C50.412 MALIGNANT NEOPLASM OF UPPER-OUTER QUADRANT OF LEFT BREAST IN FEMALE, ESTROGEN RECEPTOR NEGATIVE (H): Primary | ICD-10-CM

## 2022-02-08 DIAGNOSIS — C50.912 HER2-POSITIVE CARCINOMA OF LEFT BREAST (H): ICD-10-CM

## 2022-02-08 DIAGNOSIS — Z17.1 MALIGNANT NEOPLASM OF UPPER-OUTER QUADRANT OF LEFT BREAST IN FEMALE, ESTROGEN RECEPTOR NEGATIVE (H): Primary | ICD-10-CM

## 2022-02-08 PROCEDURE — 99214 OFFICE O/P EST MOD 30 MIN: CPT | Mod: 95 | Performed by: INTERNAL MEDICINE

## 2022-02-08 PROCEDURE — 96415 CHEMO IV INFUSION ADDL HR: CPT

## 2022-02-08 PROCEDURE — 250N000013 HC RX MED GY IP 250 OP 250 PS 637: Performed by: INTERNAL MEDICINE

## 2022-02-08 PROCEDURE — 96413 CHEMO IV INFUSION 1 HR: CPT

## 2022-02-08 PROCEDURE — 258N000003 HC RX IP 258 OP 636: Performed by: INTERNAL MEDICINE

## 2022-02-08 PROCEDURE — 250N000011 HC RX IP 250 OP 636: Performed by: INTERNAL MEDICINE

## 2022-02-08 PROCEDURE — 96375 TX/PRO/DX INJ NEW DRUG ADDON: CPT

## 2022-02-08 PROCEDURE — 250N000009 HC RX 250: Performed by: INTERNAL MEDICINE

## 2022-02-08 PROCEDURE — 96417 CHEMO IV INFUS EACH ADDL SEQ: CPT

## 2022-02-08 RX ORDER — NALOXONE HYDROCHLORIDE 0.4 MG/ML
0.2 INJECTION, SOLUTION INTRAMUSCULAR; INTRAVENOUS; SUBCUTANEOUS
Status: CANCELLED | OUTPATIENT
Start: 2022-03-15

## 2022-02-08 RX ORDER — ALBUTEROL SULFATE 0.83 MG/ML
2.5 SOLUTION RESPIRATORY (INHALATION)
Status: CANCELLED | OUTPATIENT
Start: 2022-03-22

## 2022-02-08 RX ORDER — HEPARIN SODIUM,PORCINE 10 UNIT/ML
5 VIAL (ML) INTRAVENOUS
Status: CANCELLED | OUTPATIENT
Start: 2022-03-15

## 2022-02-08 RX ORDER — HEPARIN SODIUM (PORCINE) LOCK FLUSH IV SOLN 100 UNIT/ML 100 UNIT/ML
5 SOLUTION INTRAVENOUS
Status: CANCELLED | OUTPATIENT
Start: 2022-03-15

## 2022-02-08 RX ORDER — MEPERIDINE HYDROCHLORIDE 25 MG/ML
25 INJECTION INTRAMUSCULAR; INTRAVENOUS; SUBCUTANEOUS EVERY 30 MIN PRN
Status: CANCELLED | OUTPATIENT
Start: 2022-03-22

## 2022-02-08 RX ORDER — ALBUTEROL SULFATE 0.83 MG/ML
2.5 SOLUTION RESPIRATORY (INHALATION)
Status: CANCELLED | OUTPATIENT
Start: 2022-03-01

## 2022-02-08 RX ORDER — NALOXONE HYDROCHLORIDE 0.4 MG/ML
0.2 INJECTION, SOLUTION INTRAMUSCULAR; INTRAVENOUS; SUBCUTANEOUS
Status: CANCELLED | OUTPATIENT
Start: 2022-02-22

## 2022-02-08 RX ORDER — NALOXONE HYDROCHLORIDE 0.4 MG/ML
0.2 INJECTION, SOLUTION INTRAMUSCULAR; INTRAVENOUS; SUBCUTANEOUS
Status: CANCELLED | OUTPATIENT
Start: 2022-03-08

## 2022-02-08 RX ORDER — HEPARIN SODIUM (PORCINE) LOCK FLUSH IV SOLN 100 UNIT/ML 100 UNIT/ML
5 SOLUTION INTRAVENOUS
Status: CANCELLED | OUTPATIENT
Start: 2022-03-22

## 2022-02-08 RX ORDER — MEPERIDINE HYDROCHLORIDE 25 MG/ML
25 INJECTION INTRAMUSCULAR; INTRAVENOUS; SUBCUTANEOUS EVERY 30 MIN PRN
Status: CANCELLED | OUTPATIENT
Start: 2022-03-08

## 2022-02-08 RX ORDER — HEPARIN SODIUM (PORCINE) LOCK FLUSH IV SOLN 100 UNIT/ML 100 UNIT/ML
5 SOLUTION INTRAVENOUS
Status: CANCELLED | OUTPATIENT
Start: 2022-03-08

## 2022-02-08 RX ORDER — DIPHENHYDRAMINE HCL 25 MG
50 CAPSULE ORAL
Status: CANCELLED
Start: 2022-03-15

## 2022-02-08 RX ORDER — DIPHENHYDRAMINE HYDROCHLORIDE 50 MG/ML
50 INJECTION INTRAMUSCULAR; INTRAVENOUS
Status: CANCELLED
Start: 2022-02-22

## 2022-02-08 RX ORDER — METHYLPREDNISOLONE SODIUM SUCCINATE 125 MG/2ML
125 INJECTION, POWDER, LYOPHILIZED, FOR SOLUTION INTRAMUSCULAR; INTRAVENOUS
Status: CANCELLED
Start: 2022-02-22

## 2022-02-08 RX ORDER — EPINEPHRINE 1 MG/ML
0.3 INJECTION, SOLUTION INTRAMUSCULAR; SUBCUTANEOUS EVERY 5 MIN PRN
Status: CANCELLED | OUTPATIENT
Start: 2022-03-22

## 2022-02-08 RX ORDER — DIPHENHYDRAMINE HCL 25 MG
50 CAPSULE ORAL ONCE
Status: CANCELLED
Start: 2022-02-15

## 2022-02-08 RX ORDER — ALBUTEROL SULFATE 0.83 MG/ML
2.5 SOLUTION RESPIRATORY (INHALATION)
Status: CANCELLED | OUTPATIENT
Start: 2022-03-15

## 2022-02-08 RX ORDER — HEPARIN SODIUM (PORCINE) LOCK FLUSH IV SOLN 100 UNIT/ML 100 UNIT/ML
5 SOLUTION INTRAVENOUS
Status: CANCELLED | OUTPATIENT
Start: 2022-02-22

## 2022-02-08 RX ORDER — DIPHENHYDRAMINE HYDROCHLORIDE 50 MG/ML
50 INJECTION INTRAMUSCULAR; INTRAVENOUS
Status: CANCELLED
Start: 2022-03-01

## 2022-02-08 RX ORDER — MEPERIDINE HYDROCHLORIDE 25 MG/ML
25 INJECTION INTRAMUSCULAR; INTRAVENOUS; SUBCUTANEOUS EVERY 30 MIN PRN
Status: CANCELLED | OUTPATIENT
Start: 2022-02-08

## 2022-02-08 RX ORDER — EPINEPHRINE 1 MG/ML
0.3 INJECTION, SOLUTION INTRAMUSCULAR; SUBCUTANEOUS EVERY 5 MIN PRN
Status: CANCELLED | OUTPATIENT
Start: 2022-03-08

## 2022-02-08 RX ORDER — ALBUTEROL SULFATE 90 UG/1
1-2 AEROSOL, METERED RESPIRATORY (INHALATION)
Status: CANCELLED
Start: 2022-02-15

## 2022-02-08 RX ORDER — MEPERIDINE HYDROCHLORIDE 25 MG/ML
25 INJECTION INTRAMUSCULAR; INTRAVENOUS; SUBCUTANEOUS EVERY 30 MIN PRN
Status: CANCELLED | OUTPATIENT
Start: 2022-02-15

## 2022-02-08 RX ORDER — ACETAMINOPHEN 325 MG/1
650 TABLET ORAL
Status: CANCELLED | OUTPATIENT
Start: 2022-02-15

## 2022-02-08 RX ORDER — ALBUTEROL SULFATE 90 UG/1
1-2 AEROSOL, METERED RESPIRATORY (INHALATION)
Status: CANCELLED
Start: 2022-02-08

## 2022-02-08 RX ORDER — ALBUTEROL SULFATE 90 UG/1
1-2 AEROSOL, METERED RESPIRATORY (INHALATION)
Status: CANCELLED
Start: 2022-03-01

## 2022-02-08 RX ORDER — METHYLPREDNISOLONE SODIUM SUCCINATE 125 MG/2ML
125 INJECTION, POWDER, LYOPHILIZED, FOR SOLUTION INTRAMUSCULAR; INTRAVENOUS
Status: CANCELLED
Start: 2022-03-22

## 2022-02-08 RX ORDER — ALBUTEROL SULFATE 90 UG/1
1-2 AEROSOL, METERED RESPIRATORY (INHALATION)
Status: CANCELLED
Start: 2022-03-08

## 2022-02-08 RX ORDER — ACETAMINOPHEN 325 MG/1
650 TABLET ORAL
Status: CANCELLED | OUTPATIENT
Start: 2022-03-01

## 2022-02-08 RX ORDER — NALOXONE HYDROCHLORIDE 0.4 MG/ML
0.2 INJECTION, SOLUTION INTRAMUSCULAR; INTRAVENOUS; SUBCUTANEOUS
Status: CANCELLED | OUTPATIENT
Start: 2022-03-22

## 2022-02-08 RX ORDER — HEPARIN SODIUM,PORCINE 10 UNIT/ML
5 VIAL (ML) INTRAVENOUS
Status: CANCELLED | OUTPATIENT
Start: 2022-02-08

## 2022-02-08 RX ORDER — ACETAMINOPHEN 325 MG/1
650 TABLET ORAL
Status: CANCELLED | OUTPATIENT
Start: 2022-03-22

## 2022-02-08 RX ORDER — HEPARIN SODIUM,PORCINE 10 UNIT/ML
5 VIAL (ML) INTRAVENOUS
Status: CANCELLED | OUTPATIENT
Start: 2022-02-22

## 2022-02-08 RX ORDER — MEPERIDINE HYDROCHLORIDE 25 MG/ML
25 INJECTION INTRAMUSCULAR; INTRAVENOUS; SUBCUTANEOUS EVERY 30 MIN PRN
Status: CANCELLED | OUTPATIENT
Start: 2022-02-22

## 2022-02-08 RX ORDER — HEPARIN SODIUM (PORCINE) LOCK FLUSH IV SOLN 100 UNIT/ML 100 UNIT/ML
5 SOLUTION INTRAVENOUS
Status: CANCELLED | OUTPATIENT
Start: 2022-03-01

## 2022-02-08 RX ORDER — ALBUTEROL SULFATE 90 UG/1
1-2 AEROSOL, METERED RESPIRATORY (INHALATION)
Status: CANCELLED
Start: 2022-03-15

## 2022-02-08 RX ORDER — DIPHENHYDRAMINE HCL 25 MG
50 CAPSULE ORAL
Status: CANCELLED
Start: 2022-02-22

## 2022-02-08 RX ORDER — HEPARIN SODIUM (PORCINE) LOCK FLUSH IV SOLN 100 UNIT/ML 100 UNIT/ML
5 SOLUTION INTRAVENOUS
Status: CANCELLED | OUTPATIENT
Start: 2022-02-15

## 2022-02-08 RX ORDER — DIPHENHYDRAMINE HCL 25 MG
50 CAPSULE ORAL
Status: CANCELLED
Start: 2022-03-22

## 2022-02-08 RX ORDER — DIPHENHYDRAMINE HYDROCHLORIDE 50 MG/ML
50 INJECTION INTRAMUSCULAR; INTRAVENOUS
Status: COMPLETED | OUTPATIENT
Start: 2022-02-08 | End: 2022-02-08

## 2022-02-08 RX ORDER — ACETAMINOPHEN 325 MG/1
650 TABLET ORAL ONCE
Status: COMPLETED | OUTPATIENT
Start: 2022-02-08 | End: 2022-02-08

## 2022-02-08 RX ORDER — DIPHENHYDRAMINE HYDROCHLORIDE 50 MG/ML
50 INJECTION INTRAMUSCULAR; INTRAVENOUS
Status: CANCELLED
Start: 2022-03-22

## 2022-02-08 RX ORDER — ACETAMINOPHEN 325 MG/1
650 TABLET ORAL
Status: CANCELLED | OUTPATIENT
Start: 2022-03-08

## 2022-02-08 RX ORDER — METHYLPREDNISOLONE SODIUM SUCCINATE 125 MG/2ML
125 INJECTION, POWDER, LYOPHILIZED, FOR SOLUTION INTRAMUSCULAR; INTRAVENOUS
Status: CANCELLED
Start: 2022-03-08

## 2022-02-08 RX ORDER — ALBUTEROL SULFATE 90 UG/1
1-2 AEROSOL, METERED RESPIRATORY (INHALATION)
Status: CANCELLED
Start: 2022-02-22

## 2022-02-08 RX ORDER — NALOXONE HYDROCHLORIDE 0.4 MG/ML
0.2 INJECTION, SOLUTION INTRAMUSCULAR; INTRAVENOUS; SUBCUTANEOUS
Status: CANCELLED | OUTPATIENT
Start: 2022-03-01

## 2022-02-08 RX ORDER — HEPARIN SODIUM,PORCINE 10 UNIT/ML
5 VIAL (ML) INTRAVENOUS
Status: CANCELLED | OUTPATIENT
Start: 2022-02-15

## 2022-02-08 RX ORDER — DIPHENHYDRAMINE HYDROCHLORIDE 50 MG/ML
50 INJECTION INTRAMUSCULAR; INTRAVENOUS
Status: CANCELLED
Start: 2022-02-15

## 2022-02-08 RX ORDER — METHYLPREDNISOLONE SODIUM SUCCINATE 125 MG/2ML
125 INJECTION, POWDER, LYOPHILIZED, FOR SOLUTION INTRAMUSCULAR; INTRAVENOUS
Status: CANCELLED
Start: 2022-03-01

## 2022-02-08 RX ORDER — HEPARIN SODIUM (PORCINE) LOCK FLUSH IV SOLN 100 UNIT/ML 100 UNIT/ML
5 SOLUTION INTRAVENOUS
Status: CANCELLED | OUTPATIENT
Start: 2022-02-08

## 2022-02-08 RX ORDER — HEPARIN SODIUM,PORCINE 10 UNIT/ML
5 VIAL (ML) INTRAVENOUS
Status: CANCELLED | OUTPATIENT
Start: 2022-03-01

## 2022-02-08 RX ORDER — HEPARIN SODIUM,PORCINE 10 UNIT/ML
5 VIAL (ML) INTRAVENOUS
Status: CANCELLED | OUTPATIENT
Start: 2022-03-08

## 2022-02-08 RX ORDER — HEPARIN SODIUM (PORCINE) LOCK FLUSH IV SOLN 100 UNIT/ML 100 UNIT/ML
5 SOLUTION INTRAVENOUS
Status: DISCONTINUED | OUTPATIENT
Start: 2022-02-08 | End: 2022-02-08 | Stop reason: HOSPADM

## 2022-02-08 RX ORDER — ACETAMINOPHEN 325 MG/1
650 TABLET ORAL ONCE
Status: CANCELLED | OUTPATIENT
Start: 2022-02-08

## 2022-02-08 RX ORDER — EPINEPHRINE 1 MG/ML
0.3 INJECTION, SOLUTION INTRAMUSCULAR; SUBCUTANEOUS EVERY 5 MIN PRN
Status: CANCELLED | OUTPATIENT
Start: 2022-02-15

## 2022-02-08 RX ORDER — DIPHENHYDRAMINE HYDROCHLORIDE 50 MG/ML
50 INJECTION INTRAMUSCULAR; INTRAVENOUS
Status: CANCELLED
Start: 2022-03-15

## 2022-02-08 RX ORDER — HEPARIN SODIUM,PORCINE 10 UNIT/ML
5 VIAL (ML) INTRAVENOUS
Status: CANCELLED | OUTPATIENT
Start: 2022-03-22

## 2022-02-08 RX ORDER — ALBUTEROL SULFATE 90 UG/1
1-2 AEROSOL, METERED RESPIRATORY (INHALATION)
Status: CANCELLED
Start: 2022-03-22

## 2022-02-08 RX ORDER — MEPERIDINE HYDROCHLORIDE 25 MG/ML
25 INJECTION INTRAMUSCULAR; INTRAVENOUS; SUBCUTANEOUS EVERY 30 MIN PRN
Status: CANCELLED | OUTPATIENT
Start: 2022-03-15

## 2022-02-08 RX ORDER — DIPHENHYDRAMINE HCL 25 MG
50 CAPSULE ORAL
Status: CANCELLED
Start: 2022-03-01

## 2022-02-08 RX ORDER — DIPHENHYDRAMINE HYDROCHLORIDE 50 MG/ML
50 INJECTION INTRAMUSCULAR; INTRAVENOUS
Status: CANCELLED
Start: 2022-03-08

## 2022-02-08 RX ORDER — DIPHENHYDRAMINE HYDROCHLORIDE 50 MG/ML
50 INJECTION INTRAMUSCULAR; INTRAVENOUS
Status: CANCELLED
Start: 2022-02-08

## 2022-02-08 RX ORDER — NALOXONE HYDROCHLORIDE 0.4 MG/ML
0.2 INJECTION, SOLUTION INTRAMUSCULAR; INTRAVENOUS; SUBCUTANEOUS
Status: CANCELLED | OUTPATIENT
Start: 2022-02-15

## 2022-02-08 RX ORDER — ALBUTEROL SULFATE 0.83 MG/ML
2.5 SOLUTION RESPIRATORY (INHALATION)
Status: CANCELLED | OUTPATIENT
Start: 2022-02-22

## 2022-02-08 RX ORDER — EPINEPHRINE 1 MG/ML
0.3 INJECTION, SOLUTION INTRAMUSCULAR; SUBCUTANEOUS EVERY 5 MIN PRN
Status: CANCELLED | OUTPATIENT
Start: 2022-03-01

## 2022-02-08 RX ORDER — ALBUTEROL SULFATE 0.83 MG/ML
2.5 SOLUTION RESPIRATORY (INHALATION)
Status: CANCELLED | OUTPATIENT
Start: 2022-03-08

## 2022-02-08 RX ORDER — DIPHENHYDRAMINE HCL 25 MG
50 CAPSULE ORAL ONCE
Status: CANCELLED
Start: 2022-02-08

## 2022-02-08 RX ORDER — DIPHENHYDRAMINE HCL 25 MG
50 CAPSULE ORAL ONCE
Status: COMPLETED | OUTPATIENT
Start: 2022-02-08 | End: 2022-02-08

## 2022-02-08 RX ORDER — METHYLPREDNISOLONE SODIUM SUCCINATE 125 MG/2ML
125 INJECTION, POWDER, LYOPHILIZED, FOR SOLUTION INTRAMUSCULAR; INTRAVENOUS
Status: CANCELLED
Start: 2022-02-08

## 2022-02-08 RX ORDER — ACETAMINOPHEN 325 MG/1
650 TABLET ORAL
Status: CANCELLED | OUTPATIENT
Start: 2022-02-22

## 2022-02-08 RX ORDER — METHYLPREDNISOLONE SODIUM SUCCINATE 125 MG/2ML
125 INJECTION, POWDER, LYOPHILIZED, FOR SOLUTION INTRAMUSCULAR; INTRAVENOUS
Status: DISCONTINUED | OUTPATIENT
Start: 2022-02-08 | End: 2022-02-08 | Stop reason: HOSPADM

## 2022-02-08 RX ORDER — METHYLPREDNISOLONE SODIUM SUCCINATE 125 MG/2ML
125 INJECTION, POWDER, LYOPHILIZED, FOR SOLUTION INTRAMUSCULAR; INTRAVENOUS
Status: CANCELLED
Start: 2022-02-15

## 2022-02-08 RX ORDER — DEXAMETHASONE SODIUM PHOSPHATE 10 MG/ML
20 INJECTION, SOLUTION INTRAMUSCULAR; INTRAVENOUS ONCE
Status: COMPLETED | OUTPATIENT
Start: 2022-02-08 | End: 2022-02-08

## 2022-02-08 RX ORDER — MEPERIDINE HYDROCHLORIDE 25 MG/ML
25 INJECTION INTRAMUSCULAR; INTRAVENOUS; SUBCUTANEOUS EVERY 30 MIN PRN
Status: CANCELLED | OUTPATIENT
Start: 2022-03-01

## 2022-02-08 RX ORDER — ALBUTEROL SULFATE 0.83 MG/ML
2.5 SOLUTION RESPIRATORY (INHALATION)
Status: CANCELLED | OUTPATIENT
Start: 2022-02-15

## 2022-02-08 RX ORDER — ACETAMINOPHEN 325 MG/1
650 TABLET ORAL
Status: CANCELLED | OUTPATIENT
Start: 2022-03-15

## 2022-02-08 RX ORDER — METHYLPREDNISOLONE SODIUM SUCCINATE 125 MG/2ML
125 INJECTION, POWDER, LYOPHILIZED, FOR SOLUTION INTRAMUSCULAR; INTRAVENOUS
Status: CANCELLED
Start: 2022-03-15

## 2022-02-08 RX ORDER — ALBUTEROL SULFATE 0.83 MG/ML
2.5 SOLUTION RESPIRATORY (INHALATION)
Status: CANCELLED | OUTPATIENT
Start: 2022-02-08

## 2022-02-08 RX ORDER — EPINEPHRINE 1 MG/ML
0.3 INJECTION, SOLUTION INTRAMUSCULAR; SUBCUTANEOUS EVERY 5 MIN PRN
Status: CANCELLED | OUTPATIENT
Start: 2022-03-15

## 2022-02-08 RX ORDER — EPINEPHRINE 1 MG/ML
0.3 INJECTION, SOLUTION INTRAMUSCULAR; SUBCUTANEOUS EVERY 5 MIN PRN
Status: CANCELLED | OUTPATIENT
Start: 2022-02-22

## 2022-02-08 RX ORDER — DIPHENHYDRAMINE HCL 25 MG
50 CAPSULE ORAL
Status: CANCELLED
Start: 2022-03-08

## 2022-02-08 RX ORDER — EPINEPHRINE 1 MG/ML
0.3 INJECTION, SOLUTION INTRAMUSCULAR; SUBCUTANEOUS EVERY 5 MIN PRN
Status: CANCELLED | OUTPATIENT
Start: 2022-02-08

## 2022-02-08 RX ORDER — NALOXONE HYDROCHLORIDE 0.4 MG/ML
0.2 INJECTION, SOLUTION INTRAMUSCULAR; INTRAVENOUS; SUBCUTANEOUS
Status: CANCELLED | OUTPATIENT
Start: 2022-02-08

## 2022-02-08 RX ADMIN — Medication 5 ML: at 15:58

## 2022-02-08 RX ADMIN — DIPHENHYDRAMINE HYDROCHLORIDE 50 MG: 25 CAPSULE ORAL at 10:33

## 2022-02-08 RX ADMIN — DIPHENHYDRAMINE HYDROCHLORIDE 50 MG: 50 INJECTION, SOLUTION INTRAMUSCULAR; INTRAVENOUS at 13:41

## 2022-02-08 RX ADMIN — DEXAMETHASONE SODIUM PHOSPHATE 20 MG: 10 INJECTION, SOLUTION INTRAMUSCULAR; INTRAVENOUS at 10:36

## 2022-02-08 RX ADMIN — SODIUM CHLORIDE 250 ML: 9 INJECTION, SOLUTION INTRAVENOUS at 10:25

## 2022-02-08 RX ADMIN — ACETAMINOPHEN 650 MG: 325 TABLET, FILM COATED ORAL at 10:33

## 2022-02-08 RX ADMIN — PACLITAXEL 173 MG: 6 INJECTION, SOLUTION INTRAVENOUS at 13:25

## 2022-02-08 RX ADMIN — SODIUM CHLORIDE 400 MG: 9 INJECTION, SOLUTION INTRAVENOUS at 11:17

## 2022-02-08 RX ADMIN — METHYLPREDNISOLONE SODIUM SUCCINATE 125 MG: 125 INJECTION, POWDER, FOR SOLUTION INTRAMUSCULAR; INTRAVENOUS at 13:44

## 2022-02-08 RX ADMIN — FAMOTIDINE 20 MG: 10 INJECTION, SOLUTION INTRAVENOUS at 13:00

## 2022-02-08 ASSESSMENT — MIFFLIN-ST. JEOR: SCORE: 1492.86

## 2022-02-08 ASSESSMENT — PAIN SCALES - GENERAL: PAINLEVEL: NO PAIN (0)

## 2022-02-08 NOTE — PROGRESS NOTES
Di is a 76 year old who is being evaluated via a billable video visit.  Currently in [facility].      How would you like to obtain your AVS? MyChart  If the video visit is dropped, the invitation should be resent by: Text to cell phone: [phone]  Will anyone else be joining your video visit? No

## 2022-02-08 NOTE — PROGRESS NOTES
Infusion Nursing Note:  Di Donaldson presents today for C1D1 Taxol/ trastuzumab.    Patient seen by provider today: No   present during visit today: Not Applicable.    Note: Pt here for first dose chemotherapy.  Discussed that usually she will get labs done in clinic using her port and labs will be monitored prior to chemo administration.  Pt tolerated Herceptin well but 50 ml into Taxol c/o feeling nauseous and back discomfort.  Infusion shut off .  Skin flushed.  Sats 91%, O2 applied.  Rapid response called.  Pt supported w/ IV fluids and O2.  Benadryl and Solumedrol given w/ relief and reversal of symptoms.  Pt observed x 1/2 hour.  Dr Meyer notified and orders rec'd to re-challenge if pt in agreement.  Pt restarted at lower rate and monitored.  Rate increased to ordered rate and pt tolerated infusion well.  Pt discharged in stable condition with friend.          Intravenous Access:  Implanted Port.    Treatment Conditions:  Lab Results   Component Value Date    HGB 12.6 02/07/2022    WBC 7.8 02/07/2022    ANEUTAUTO 5.9 02/07/2022     02/07/2022      Lab Results   Component Value Date    BILITOTAL 0.5 02/07/2022    ALBUMIN 3.7 02/07/2022    ALT 21 02/07/2022    AST 13 02/07/2022     Results reviewed, labs MET treatment parameters, ok to proceed with treatment.      Post Infusion Assessment:  Patient tolerated infusion without incident.  Patient observed for 15 minutes post chemotherapy per protocol.  Blood return noted pre and post infusion.  Site patent and intact, free from redness, edema or discomfort.  No evidence of extravasations.  Access discontinued per protocol.       Discharge Plan:   Discharge instructions reviewed with: Patient.  Patient discharged in stable condition accompanied by: friend.  Departure Mode: Ambulatory.      Jayda Palomares RN

## 2022-02-08 NOTE — LETTER
2022         RE: Di Donaldson  [id number]  [place] [address]        Dear Colleague,    Thank you for referring your patient, Di Donaldson, to the [facility]. Please see a copy of my visit note below.    Di is a 76 year old who is being evaluated via a billable video visit.  [place].      How would you like to obtain your AVS? MyChart  If the video visit is dropped, the invitation should be resent by: Text to cell phone: [phone]  Will anyone else be joining your video visit? No    [facility] Hematology / Oncology  Progress Note  Name: Di Donaldson  :  1945    MRN:  [id number]    --------------------    Assessment / Plan:  Early-stage, left-sided, hormone negative, HER-2 positive breast cancer.    Clinically well and stable.  Reviewed imaging to date as well as labs in reassuring echocardiogram.  Provided anticipatory guidance and supportive care as we embark on treatment.  Biggest risks with Taxol include alopecia, allergic reaction, infectious risk, fatigue, peripheral neuropathy among others.  Biggest risks with Herceptin include diarrhea and cardiomyopathy; imodium as needed a supportive care.  Planning 12 weeks of neoadjuvant treatment followed by reassessment imaging followed by surgery.    Osorio Meyer MD    --------------------    Interval History:  Di returns for follow up of breast cancer accompanied by a friend.  Entirety of time spent in consultation    --------------------    Physical Exam:  Virtual visit.    Labs / Imaging / Path:  We reviewed labs, personally reviewed imaging and reviewed pathology reports     Video Visit:  Di is a 76 year old female who is being evaluated via a billable video visit.  }    Video start time: 8:47 AM  Video end time: 9:02 AM    Provider location: [facility]  Patient location: [facility]    Mode of transmission: [employer] / [employer].        Again,  thank you for allowing me to participate in the care of your patient.        Sincerely,        Osorio Meyer MD

## 2022-02-09 ENCOUNTER — PATIENT OUTREACH (OUTPATIENT)
Dept: CARE COORDINATION | Facility: CLINIC | Age: 77
End: 2022-02-09

## 2022-02-09 NOTE — PROGRESS NOTES
"Oncology Distress Screening Follow-up  Clinical Social Work  [facility]    Identified Concern and Score From Distress Screenin. How concerned are you about your ability to eat?  5         2. How concerned are you about unintended weight loss or your current weight?  0         3. How concerned are you about feeling depressed or very sad?  5         4. How concerned are you about feeling anxious or very scared?  4         5. Do you struggle with the loss of meaning and willy in your life?  Quite a bit Abnormal          6. How concerned are you about work and home life issues that may be affected by your cancer?  3         7. How concerned are you about knowing what resources are available to help you?  8 Abnormal          8. Do you currently have what you would describe as Shinto or spiritual struggles?             Not at all                   You can also ask to be contacted by one of our Oncology Supportive Care professionals.      9. If you want to be contacted by one of our professionals, I can send a message to them right now.  Oncology Social Worker             Date of Distress Screenin2022      Data: Early-stage, left-sided, hormone negative, HER-2 positive breast cancer.     Intervention/Education Provided:  Chantelle contacted Adeline regarding a recent distress screen. Di was in a good head space this morning stating she is \"good\". Di said a lot has happened in recent years and feels like she is waiting for something else to happen. Her   in 2020 and she had knee surgery not too long after. At this time, she does live alone. That being said, Di notes she has lots of social supports. Some include her good friend, kids and her neighbors along with her sisters and mom who have also had breast cancer. Though she can drive, these supports do help her get to and from appointments. Chantelle provided Di with [employer] transportation information  for transportation if she may need " ([employer] #[phone]). Di indicates she goes to [employer] almost weekly and finds this to be a big comfort for her. At this time, no other concerns were noted.      Follow-up Required: Chantelle encouraged Di to call if she had any questions on avaliable resources or other concerns.       CARMENCITA Bernstein Intern  [facility]

## 2022-02-15 ENCOUNTER — INFUSION THERAPY VISIT (OUTPATIENT)
Dept: INFUSION THERAPY | Facility: CLINIC | Age: 77
End: 2022-02-15
Attending: INTERNAL MEDICINE
Payer: COMMERCIAL

## 2022-02-15 ENCOUNTER — LAB (OUTPATIENT)
Dept: INFUSION THERAPY | Facility: CLINIC | Age: 77
End: 2022-02-15

## 2022-02-15 VITALS
SYSTOLIC BLOOD PRESSURE: 168 MMHG | BODY MASS INDEX: 34.06 KG/M2 | TEMPERATURE: 98.1 F | DIASTOLIC BLOOD PRESSURE: 72 MMHG | OXYGEN SATURATION: 100 % | RESPIRATION RATE: 18 BRPM | HEIGHT: 67 IN | HEART RATE: 66 BPM | WEIGHT: 217 LBS

## 2022-02-15 DIAGNOSIS — Z17.1 MALIGNANT NEOPLASM OF UPPER-OUTER QUADRANT OF LEFT BREAST IN FEMALE, ESTROGEN RECEPTOR NEGATIVE (H): Primary | ICD-10-CM

## 2022-02-15 DIAGNOSIS — C50.412 MALIGNANT NEOPLASM OF UPPER-OUTER QUADRANT OF LEFT BREAST IN FEMALE, ESTROGEN RECEPTOR NEGATIVE (H): Primary | ICD-10-CM

## 2022-02-15 LAB
BASOPHILS # BLD AUTO: 0.1 10E3/UL (ref 0–0.2)
BASOPHILS NFR BLD AUTO: 1 %
EOSINOPHIL # BLD AUTO: 0.3 10E3/UL (ref 0–0.7)
EOSINOPHIL NFR BLD AUTO: 6 %
ERYTHROCYTE [DISTWIDTH] IN BLOOD BY AUTOMATED COUNT: 13.2 % (ref 10–15)
HCT VFR BLD AUTO: 33.4 % (ref 35–47)
HGB BLD-MCNC: 11 G/DL (ref 11.7–15.7)
IMM GRANULOCYTES # BLD: 0.1 10E3/UL
IMM GRANULOCYTES NFR BLD: 1 %
LYMPHOCYTES # BLD AUTO: 0.9 10E3/UL (ref 0.8–5.3)
LYMPHOCYTES NFR BLD AUTO: 16 %
MCH RBC QN AUTO: 29.6 PG (ref 26.5–33)
MCHC RBC AUTO-ENTMCNC: 32.9 G/DL (ref 31.5–36.5)
MCV RBC AUTO: 90 FL (ref 78–100)
MONOCYTES # BLD AUTO: 0.2 10E3/UL (ref 0–1.3)
MONOCYTES NFR BLD AUTO: 3 %
NEUTROPHILS # BLD AUTO: 4.2 10E3/UL (ref 1.6–8.3)
NEUTROPHILS NFR BLD AUTO: 73 %
NRBC # BLD AUTO: 0 10E3/UL
NRBC BLD AUTO-RTO: 0 /100
PLATELET # BLD AUTO: 191 10E3/UL (ref 150–450)
RBC # BLD AUTO: 3.71 10E6/UL (ref 3.8–5.2)
WBC # BLD AUTO: 5.6 10E3/UL (ref 4–11)

## 2022-02-15 PROCEDURE — 85041 AUTOMATED RBC COUNT: CPT | Performed by: INTERNAL MEDICINE

## 2022-02-15 PROCEDURE — 258N000003 HC RX IP 258 OP 636: Performed by: INTERNAL MEDICINE

## 2022-02-15 PROCEDURE — 250N000011 HC RX IP 250 OP 636: Performed by: INTERNAL MEDICINE

## 2022-02-15 PROCEDURE — 96365 THER/PROPH/DIAG IV INF INIT: CPT

## 2022-02-15 PROCEDURE — 96413 CHEMO IV INFUSION 1 HR: CPT

## 2022-02-15 PROCEDURE — 250N000009 HC RX 250: Performed by: INTERNAL MEDICINE

## 2022-02-15 PROCEDURE — 96375 TX/PRO/DX INJ NEW DRUG ADDON: CPT

## 2022-02-15 PROCEDURE — 96367 TX/PROPH/DG ADDL SEQ IV INF: CPT

## 2022-02-15 RX ORDER — DIPHENHYDRAMINE HYDROCHLORIDE 50 MG/ML
50 INJECTION INTRAMUSCULAR; INTRAVENOUS ONCE
Status: COMPLETED | OUTPATIENT
Start: 2022-02-15 | End: 2022-02-15

## 2022-02-15 RX ORDER — DEXAMETHASONE SODIUM PHOSPHATE 10 MG/ML
20 INJECTION, SOLUTION INTRAMUSCULAR; INTRAVENOUS ONCE
Status: COMPLETED | OUTPATIENT
Start: 2022-02-15 | End: 2022-02-15

## 2022-02-15 RX ORDER — HEPARIN SODIUM (PORCINE) LOCK FLUSH IV SOLN 100 UNIT/ML 100 UNIT/ML
5 SOLUTION INTRAVENOUS
Status: DISCONTINUED | OUTPATIENT
Start: 2022-02-15 | End: 2022-02-16 | Stop reason: HOSPADM

## 2022-02-15 RX ADMIN — DIPHENHYDRAMINE HYDROCHLORIDE 50 MG: 50 INJECTION, SOLUTION INTRAMUSCULAR; INTRAVENOUS at 14:31

## 2022-02-15 RX ADMIN — SODIUM CHLORIDE 250 ML: 9 INJECTION, SOLUTION INTRAVENOUS at 14:15

## 2022-02-15 RX ADMIN — PACLITAXEL 173 MG: 6 INJECTION, SOLUTION INTRAVENOUS at 15:28

## 2022-02-15 RX ADMIN — Medication 5 ML: at 16:41

## 2022-02-15 RX ADMIN — FAMOTIDINE 20 MG: 10 INJECTION, SOLUTION INTRAVENOUS at 14:35

## 2022-02-15 RX ADMIN — SODIUM CHLORIDE 200 MG: 9 INJECTION, SOLUTION INTRAVENOUS at 14:40

## 2022-02-15 RX ADMIN — DEXAMETHASONE SODIUM PHOSPHATE 20 MG: 10 INJECTION, SOLUTION INTRAMUSCULAR; INTRAVENOUS at 14:18

## 2022-02-15 ASSESSMENT — MIFFLIN-ST. JEOR: SCORE: 1501.94

## 2022-02-15 ASSESSMENT — PAIN SCALES - GENERAL: PAINLEVEL: NO PAIN (0)

## 2022-02-15 NOTE — PROGRESS NOTES
Infusion Nursing Note:  Di AYANA Donaldson presents today for Port Labs.    Patient seen by provider today: No   present during visit today: Not Applicable.    Note: N/A.      Intravenous Access:  Labs drawn without difficulty.  Implanted Port.    Treatment Conditions:  Lab Results   Component Value Date    HGB 11.0 (L) 02/15/2022    WBC 5.6 02/15/2022    ANEUTAUTO 4.2 02/15/2022     02/15/2022      Results reviewed, labs MET treatment parameters, ok to proceed with treatment.      Post Infusion Assessment:  NA.       Discharge Plan:   Awaiting results for treatment today.      Altagracia Moss RN

## 2022-02-16 NOTE — PROGRESS NOTES
Infusion Nursing Note:  Di Donaldson presents today for C2 D1 Trazimera and Taxol.    Patient seen by provider today: No   present during visit today: Not Applicable.    Note: Patient tolerated 30 min Trazimera well, premeds given prior to Trazimera so will be active during Taxol. Patient reacted first Taxol cycle so monitored closely. Patient tolerated Taxol today without problems, denies flushing or pain like last week. Blood pressure elevated a little at end of Taxol but had an incontinent episode and was in bathroom for awhile, a little upset after but reassured patient.      Intravenous Access:  Implanted Port.    Treatment Conditions:  Lab Results   Component Value Date    HGB 11.0 (L) 02/15/2022    WBC 5.6 02/15/2022    ANEUTAUTO 4.2 02/15/2022     02/15/2022      Lab Results   Component Value Date    BILITOTAL 0.5 02/07/2022    ALBUMIN 3.7 02/07/2022    ALT 21 02/07/2022    AST 13 02/07/2022     Results reviewed, labs MET treatment parameters, ok to proceed with treatment.      Post Infusion Assessment:  Patient tolerated infusion without incident.  Patient observed for 15 minutes post infusion per protocol.  Blood return noted pre and post infusion.  Site patent and intact, free from redness, edema or discomfort.  No evidence of extravasations.  Access discontinued per protocol.       Discharge Plan:   Discharge instructions reviewed with: Patient.  Patient and/or family verbalized understanding of discharge instructions and all questions answered.  Patient discharged in stable condition accompanied by: Family.  Departure Mode: Ambulatory.      Altagracia Moss RN

## 2022-02-22 ENCOUNTER — LAB (OUTPATIENT)
Dept: INFUSION THERAPY | Facility: CLINIC | Age: 77
End: 2022-02-22

## 2022-02-22 ENCOUNTER — INFUSION THERAPY VISIT (OUTPATIENT)
Dept: INFUSION THERAPY | Facility: CLINIC | Age: 77
End: 2022-02-22
Attending: INTERNAL MEDICINE
Payer: COMMERCIAL

## 2022-02-22 VITALS
DIASTOLIC BLOOD PRESSURE: 69 MMHG | BODY MASS INDEX: 33.34 KG/M2 | SYSTOLIC BLOOD PRESSURE: 146 MMHG | RESPIRATION RATE: 20 BRPM | TEMPERATURE: 97.8 F | HEART RATE: 69 BPM | WEIGHT: 212.9 LBS | OXYGEN SATURATION: 100 %

## 2022-02-22 DIAGNOSIS — Z17.1 MALIGNANT NEOPLASM OF UPPER-OUTER QUADRANT OF LEFT BREAST IN FEMALE, ESTROGEN RECEPTOR NEGATIVE (H): Primary | ICD-10-CM

## 2022-02-22 DIAGNOSIS — C50.412 MALIGNANT NEOPLASM OF UPPER-OUTER QUADRANT OF LEFT BREAST IN FEMALE, ESTROGEN RECEPTOR NEGATIVE (H): Primary | ICD-10-CM

## 2022-02-22 LAB
BASOPHILS # BLD AUTO: 0.1 10E3/UL (ref 0–0.2)
BASOPHILS NFR BLD AUTO: 2 %
EOSINOPHIL # BLD AUTO: 0.2 10E3/UL (ref 0–0.7)
EOSINOPHIL NFR BLD AUTO: 6 %
ERYTHROCYTE [DISTWIDTH] IN BLOOD BY AUTOMATED COUNT: 13.4 % (ref 10–15)
HCT VFR BLD AUTO: 33.4 % (ref 35–47)
HGB BLD-MCNC: 11.1 G/DL (ref 11.7–15.7)
IMM GRANULOCYTES # BLD: 0 10E3/UL
IMM GRANULOCYTES NFR BLD: 0 %
LYMPHOCYTES # BLD AUTO: 0.6 10E3/UL (ref 0.8–5.3)
LYMPHOCYTES NFR BLD AUTO: 20 %
MCH RBC QN AUTO: 29.9 PG (ref 26.5–33)
MCHC RBC AUTO-ENTMCNC: 33.2 G/DL (ref 31.5–36.5)
MCV RBC AUTO: 90 FL (ref 78–100)
MONOCYTES # BLD AUTO: 0.2 10E3/UL (ref 0–1.3)
MONOCYTES NFR BLD AUTO: 7 %
NEUTROPHILS # BLD AUTO: 2 10E3/UL (ref 1.6–8.3)
NEUTROPHILS NFR BLD AUTO: 65 %
NRBC # BLD AUTO: 0 10E3/UL
NRBC BLD AUTO-RTO: 0 /100
PLATELET # BLD AUTO: 207 10E3/UL (ref 150–450)
RBC # BLD AUTO: 3.71 10E6/UL (ref 3.8–5.2)
WBC # BLD AUTO: 3 10E3/UL (ref 4–11)

## 2022-02-22 PROCEDURE — 96375 TX/PRO/DX INJ NEW DRUG ADDON: CPT

## 2022-02-22 PROCEDURE — 250N000013 HC RX MED GY IP 250 OP 250 PS 637: Performed by: INTERNAL MEDICINE

## 2022-02-22 PROCEDURE — 96417 CHEMO IV INFUS EACH ADDL SEQ: CPT

## 2022-02-22 PROCEDURE — 85025 COMPLETE CBC W/AUTO DIFF WBC: CPT | Performed by: INTERNAL MEDICINE

## 2022-02-22 PROCEDURE — 258N000003 HC RX IP 258 OP 636: Performed by: INTERNAL MEDICINE

## 2022-02-22 PROCEDURE — 250N000011 HC RX IP 250 OP 636: Performed by: INTERNAL MEDICINE

## 2022-02-22 PROCEDURE — 250N000009 HC RX 250: Performed by: INTERNAL MEDICINE

## 2022-02-22 PROCEDURE — 96413 CHEMO IV INFUSION 1 HR: CPT

## 2022-02-22 RX ORDER — HEPARIN SODIUM (PORCINE) LOCK FLUSH IV SOLN 100 UNIT/ML 100 UNIT/ML
5 SOLUTION INTRAVENOUS
Status: DISCONTINUED | OUTPATIENT
Start: 2022-02-22 | End: 2022-02-22 | Stop reason: HOSPADM

## 2022-02-22 RX ORDER — DIPHENHYDRAMINE HCL 25 MG
50 CAPSULE ORAL
Status: DISCONTINUED | OUTPATIENT
Start: 2022-02-22 | End: 2022-02-22 | Stop reason: HOSPADM

## 2022-02-22 RX ORDER — DEXAMETHASONE SODIUM PHOSPHATE 10 MG/ML
20 INJECTION, SOLUTION INTRAMUSCULAR; INTRAVENOUS ONCE
Status: COMPLETED | OUTPATIENT
Start: 2022-02-22 | End: 2022-02-22

## 2022-02-22 RX ADMIN — DIPHENHYDRAMINE HYDROCHLORIDE 50 MG: 25 CAPSULE ORAL at 11:35

## 2022-02-22 RX ADMIN — Medication 5 ML: at 14:05

## 2022-02-22 RX ADMIN — SODIUM CHLORIDE 200 MG: 9 INJECTION, SOLUTION INTRAVENOUS at 12:03

## 2022-02-22 RX ADMIN — FAMOTIDINE 20 MG: 10 INJECTION, SOLUTION INTRAVENOUS at 11:37

## 2022-02-22 RX ADMIN — PACLITAXEL 173 MG: 6 INJECTION, SOLUTION INTRAVENOUS at 12:47

## 2022-02-22 RX ADMIN — SODIUM CHLORIDE 250 ML: 9 INJECTION, SOLUTION INTRAVENOUS at 11:37

## 2022-02-22 RX ADMIN — DEXAMETHASONE SODIUM PHOSPHATE 20 MG: 10 INJECTION, SOLUTION INTRAMUSCULAR; INTRAVENOUS at 11:40

## 2022-02-22 ASSESSMENT — PAIN SCALES - GENERAL: PAINLEVEL: NO PAIN (0)

## 2022-02-22 NOTE — PROGRESS NOTES
Infusion Nursing Note:  Di Donaldson presents today for C3D1.  Trazimera and Taxol  Patient seen by provider today: No   present during visit today: Not Applicable.    Note: Patient here today for breast cancer treatment with Trazimera and Taxol.  Labs prior to start of treatment.  Premeds given today due to patients previous reaction to Taxol with her initial treatment. .      Intravenous Access:  Implanted Port.    Treatment Conditions:  Lab Results   Component Value Date    HGB 11.1 (L) 02/22/2022    WBC 3.0 (L) 02/22/2022    ANEUTAUTO 2.0 02/22/2022     02/22/2022      Results reviewed, labs MET treatment parameters, ok to proceed with treatment.  ECHO/MUGA completed 02/02/2022  EF 60-65%.      Post Infusion Assessment:  Patient tolerated infusion without incident.  Patient observed for 15 minutes post infusion per protocol.  Blood return noted pre and post infusion.  Site patent and intact, free from redness, edema or discomfort.  No evidence of extravasations.  Access discontinued per protocol.       Discharge Plan:   Patient discharged in stable condition accompanied by: friend.  Departure Mode: Ambulatory.      Britney Tilley RN

## 2022-02-22 NOTE — PROGRESS NOTES
Infusion Nursing Note:  Di Donaldson presents today for labs prior chemo treatment today.    Patient seen by provider today: No   present during visit today: Not Applicable.    Note: Patient presents to infusion therapy today for labs prior to her treament.  Labs drawn off of port access. .      Intravenous Access:  Implanted Port.    Treatment Conditions:  Lab Results   Component Value Date    HGB 11.1 (L) 02/22/2022    WBC 3.0 (L) 02/22/2022    ANEUTAUTO 2.0 02/22/2022     02/22/2022          Post Infusion Assessment:  Will follow with treatment since labs meet order parameters. .       Discharge Plan:   Will plan for treatment today. .      Britney Tilley RN

## 2022-02-23 ENCOUNTER — PATIENT OUTREACH (OUTPATIENT)
Dept: CARE COORDINATION | Facility: CLINIC | Age: 77
End: 2022-02-23

## 2022-02-23 NOTE — PROGRESS NOTES
Social Work Note: Telephone Call  Oncology Clinic    Data/Intervention:  Patient Name:  Di Donaldson  /Age:  1945 (76 year old)    Call From:  Yudi TSE Intern  Reason for Call:  Follow up     Assessment:  Writer called Di this afternoon to follow up from our last phone call. Di told writer she is feeling good and that she is not having any side effects from treatment thus far. Writer asked about her supports at home and Di stated that her son and two friends have been helpful at this time. Di was in a great mood, joking with writer and did not need any financial resources at this time.     Di was appreciative of writers phone call.     Plan:  Writer will follow up with Di as her treatments progress. Writer encouraged Di to reach out if she has any questions or concerns that arise.     CARMENCITA Bernstein Intern  [facility]

## 2022-03-01 ENCOUNTER — LAB (OUTPATIENT)
Dept: INFUSION THERAPY | Facility: CLINIC | Age: 77
End: 2022-03-01

## 2022-03-01 ENCOUNTER — INFUSION THERAPY VISIT (OUTPATIENT)
Dept: INFUSION THERAPY | Facility: CLINIC | Age: 77
End: 2022-03-01
Attending: INTERNAL MEDICINE
Payer: COMMERCIAL

## 2022-03-01 ENCOUNTER — VIRTUAL VISIT (OUTPATIENT)
Dept: ONCOLOGY | Facility: CLINIC | Age: 77
End: 2022-03-01
Payer: COMMERCIAL

## 2022-03-01 VITALS
SYSTOLIC BLOOD PRESSURE: 140 MMHG | OXYGEN SATURATION: 96 % | RESPIRATION RATE: 16 BRPM | HEART RATE: 77 BPM | TEMPERATURE: 98.3 F | DIASTOLIC BLOOD PRESSURE: 72 MMHG

## 2022-03-01 VITALS — BODY MASS INDEX: 33.56 KG/M2 | WEIGHT: 214.3 LBS

## 2022-03-01 DIAGNOSIS — Z17.1 MALIGNANT NEOPLASM OF UPPER-OUTER QUADRANT OF LEFT BREAST IN FEMALE, ESTROGEN RECEPTOR NEGATIVE (H): Primary | ICD-10-CM

## 2022-03-01 DIAGNOSIS — R04.0 EPISTAXIS: ICD-10-CM

## 2022-03-01 DIAGNOSIS — C50.412 MALIGNANT NEOPLASM OF UPPER-OUTER QUADRANT OF LEFT BREAST IN FEMALE, ESTROGEN RECEPTOR NEGATIVE (H): Primary | ICD-10-CM

## 2022-03-01 DIAGNOSIS — Z17.31 HER2-POSITIVE CARCINOMA OF LEFT BREAST (H): ICD-10-CM

## 2022-03-01 DIAGNOSIS — C50.912 HER2-POSITIVE CARCINOMA OF LEFT BREAST (H): ICD-10-CM

## 2022-03-01 LAB
ALBUMIN SERPL-MCNC: 3.4 G/DL (ref 3.4–5)
ALP SERPL-CCNC: 73 U/L (ref 40–150)
ALT SERPL W P-5'-P-CCNC: 25 U/L (ref 0–50)
AST SERPL W P-5'-P-CCNC: 11 U/L (ref 0–45)
BASOPHILS # BLD AUTO: 0.1 10E3/UL (ref 0–0.2)
BASOPHILS NFR BLD AUTO: 2 %
BILIRUB DIRECT SERPL-MCNC: <0.1 MG/DL (ref 0–0.2)
BILIRUB SERPL-MCNC: 0.3 MG/DL (ref 0.2–1.3)
EOSINOPHIL # BLD AUTO: 0.3 10E3/UL (ref 0–0.7)
EOSINOPHIL NFR BLD AUTO: 7 %
ERYTHROCYTE [DISTWIDTH] IN BLOOD BY AUTOMATED COUNT: 13.8 % (ref 10–15)
HCT VFR BLD AUTO: 32.3 % (ref 35–47)
HGB BLD-MCNC: 10.8 G/DL (ref 11.7–15.7)
IMM GRANULOCYTES # BLD: 0.1 10E3/UL
IMM GRANULOCYTES NFR BLD: 1 %
LYMPHOCYTES # BLD AUTO: 0.8 10E3/UL (ref 0.8–5.3)
LYMPHOCYTES NFR BLD AUTO: 19 %
MCH RBC QN AUTO: 30.1 PG (ref 26.5–33)
MCHC RBC AUTO-ENTMCNC: 33.4 G/DL (ref 31.5–36.5)
MCV RBC AUTO: 90 FL (ref 78–100)
MONOCYTES # BLD AUTO: 0.3 10E3/UL (ref 0–1.3)
MONOCYTES NFR BLD AUTO: 7 %
NEUTROPHILS # BLD AUTO: 3 10E3/UL (ref 1.6–8.3)
NEUTROPHILS NFR BLD AUTO: 64 %
NRBC # BLD AUTO: 0 10E3/UL
NRBC BLD AUTO-RTO: 0 /100
PLATELET # BLD AUTO: 222 10E3/UL (ref 150–450)
PROT SERPL-MCNC: 6.4 G/DL (ref 6.8–8.8)
RBC # BLD AUTO: 3.59 10E6/UL (ref 3.8–5.2)
WBC # BLD AUTO: 4.6 10E3/UL (ref 4–11)

## 2022-03-01 PROCEDURE — 96375 TX/PRO/DX INJ NEW DRUG ADDON: CPT

## 2022-03-01 PROCEDURE — 80076 HEPATIC FUNCTION PANEL: CPT | Performed by: INTERNAL MEDICINE

## 2022-03-01 PROCEDURE — 99214 OFFICE O/P EST MOD 30 MIN: CPT | Mod: 95 | Performed by: INTERNAL MEDICINE

## 2022-03-01 PROCEDURE — 96417 CHEMO IV INFUS EACH ADDL SEQ: CPT

## 2022-03-01 PROCEDURE — 250N000009 HC RX 250: Performed by: INTERNAL MEDICINE

## 2022-03-01 PROCEDURE — 85018 HEMOGLOBIN: CPT | Performed by: INTERNAL MEDICINE

## 2022-03-01 PROCEDURE — 250N000013 HC RX MED GY IP 250 OP 250 PS 637: Performed by: INTERNAL MEDICINE

## 2022-03-01 PROCEDURE — 250N000011 HC RX IP 250 OP 636: Performed by: INTERNAL MEDICINE

## 2022-03-01 PROCEDURE — 96413 CHEMO IV INFUSION 1 HR: CPT

## 2022-03-01 PROCEDURE — 258N000003 HC RX IP 258 OP 636: Performed by: INTERNAL MEDICINE

## 2022-03-01 RX ORDER — HEPARIN SODIUM (PORCINE) LOCK FLUSH IV SOLN 100 UNIT/ML 100 UNIT/ML
5 SOLUTION INTRAVENOUS
Status: CANCELLED | OUTPATIENT
Start: 2022-04-26

## 2022-03-01 RX ORDER — ACETAMINOPHEN 325 MG/1
650 TABLET ORAL
Status: CANCELLED | OUTPATIENT
Start: 2022-04-12

## 2022-03-01 RX ORDER — HEPARIN SODIUM,PORCINE 10 UNIT/ML
5 VIAL (ML) INTRAVENOUS
Status: CANCELLED | OUTPATIENT
Start: 2022-03-29

## 2022-03-01 RX ORDER — DIPHENHYDRAMINE HCL 25 MG
50 CAPSULE ORAL
Status: DISCONTINUED | OUTPATIENT
Start: 2022-03-01 | End: 2022-03-01 | Stop reason: HOSPADM

## 2022-03-01 RX ORDER — EPINEPHRINE 1 MG/ML
0.3 INJECTION, SOLUTION INTRAMUSCULAR; SUBCUTANEOUS EVERY 5 MIN PRN
Status: CANCELLED | OUTPATIENT
Start: 2022-04-26

## 2022-03-01 RX ORDER — ALBUTEROL SULFATE 0.83 MG/ML
2.5 SOLUTION RESPIRATORY (INHALATION)
Status: CANCELLED | OUTPATIENT
Start: 2022-04-12

## 2022-03-01 RX ORDER — NALOXONE HYDROCHLORIDE 0.4 MG/ML
0.2 INJECTION, SOLUTION INTRAMUSCULAR; INTRAVENOUS; SUBCUTANEOUS
Status: CANCELLED | OUTPATIENT
Start: 2022-04-26

## 2022-03-01 RX ORDER — HEPARIN SODIUM (PORCINE) LOCK FLUSH IV SOLN 100 UNIT/ML 100 UNIT/ML
5 SOLUTION INTRAVENOUS
Status: DISCONTINUED | OUTPATIENT
Start: 2022-03-01 | End: 2022-03-01 | Stop reason: HOSPADM

## 2022-03-01 RX ORDER — ALBUTEROL SULFATE 0.83 MG/ML
2.5 SOLUTION RESPIRATORY (INHALATION)
Status: CANCELLED | OUTPATIENT
Start: 2022-03-29

## 2022-03-01 RX ORDER — DIPHENHYDRAMINE HCL 25 MG
50 CAPSULE ORAL
Status: CANCELLED
Start: 2022-04-19

## 2022-03-01 RX ORDER — HEPARIN SODIUM (PORCINE) LOCK FLUSH IV SOLN 100 UNIT/ML 100 UNIT/ML
5 SOLUTION INTRAVENOUS
Status: CANCELLED | OUTPATIENT
Start: 2022-04-05

## 2022-03-01 RX ORDER — DIPHENHYDRAMINE HCL 25 MG
50 CAPSULE ORAL
Status: CANCELLED
Start: 2022-04-26

## 2022-03-01 RX ORDER — HEPARIN SODIUM,PORCINE 10 UNIT/ML
5 VIAL (ML) INTRAVENOUS
Status: CANCELLED | OUTPATIENT
Start: 2022-04-26

## 2022-03-01 RX ORDER — ALBUTEROL SULFATE 0.83 MG/ML
2.5 SOLUTION RESPIRATORY (INHALATION)
Status: CANCELLED | OUTPATIENT
Start: 2022-04-26

## 2022-03-01 RX ORDER — EPINEPHRINE 1 MG/ML
0.3 INJECTION, SOLUTION INTRAMUSCULAR; SUBCUTANEOUS EVERY 5 MIN PRN
Status: CANCELLED | OUTPATIENT
Start: 2022-04-19

## 2022-03-01 RX ORDER — HEPARIN SODIUM,PORCINE 10 UNIT/ML
5 VIAL (ML) INTRAVENOUS
Status: CANCELLED | OUTPATIENT
Start: 2022-04-19

## 2022-03-01 RX ORDER — ALBUTEROL SULFATE 90 UG/1
1-2 AEROSOL, METERED RESPIRATORY (INHALATION)
Status: CANCELLED
Start: 2022-03-29

## 2022-03-01 RX ORDER — NALOXONE HYDROCHLORIDE 0.4 MG/ML
0.2 INJECTION, SOLUTION INTRAMUSCULAR; INTRAVENOUS; SUBCUTANEOUS
Status: CANCELLED | OUTPATIENT
Start: 2022-04-12

## 2022-03-01 RX ORDER — EPINEPHRINE 1 MG/ML
0.3 INJECTION, SOLUTION INTRAMUSCULAR; SUBCUTANEOUS EVERY 5 MIN PRN
Status: CANCELLED | OUTPATIENT
Start: 2022-03-29

## 2022-03-01 RX ORDER — MEPERIDINE HYDROCHLORIDE 25 MG/ML
25 INJECTION INTRAMUSCULAR; INTRAVENOUS; SUBCUTANEOUS EVERY 30 MIN PRN
Status: CANCELLED | OUTPATIENT
Start: 2022-04-05

## 2022-03-01 RX ORDER — ALBUTEROL SULFATE 0.83 MG/ML
2.5 SOLUTION RESPIRATORY (INHALATION)
Status: CANCELLED | OUTPATIENT
Start: 2022-04-05

## 2022-03-01 RX ORDER — ACETAMINOPHEN 325 MG/1
650 TABLET ORAL
Status: CANCELLED | OUTPATIENT
Start: 2022-04-26

## 2022-03-01 RX ORDER — EPINEPHRINE 1 MG/ML
0.3 INJECTION, SOLUTION INTRAMUSCULAR; SUBCUTANEOUS EVERY 5 MIN PRN
Status: CANCELLED | OUTPATIENT
Start: 2022-04-12

## 2022-03-01 RX ORDER — METHYLPREDNISOLONE SODIUM SUCCINATE 125 MG/2ML
125 INJECTION, POWDER, LYOPHILIZED, FOR SOLUTION INTRAMUSCULAR; INTRAVENOUS
Status: CANCELLED
Start: 2022-04-05

## 2022-03-01 RX ORDER — MEPERIDINE HYDROCHLORIDE 25 MG/ML
25 INJECTION INTRAMUSCULAR; INTRAVENOUS; SUBCUTANEOUS EVERY 30 MIN PRN
Status: CANCELLED | OUTPATIENT
Start: 2022-04-19

## 2022-03-01 RX ORDER — HEPARIN SODIUM (PORCINE) LOCK FLUSH IV SOLN 100 UNIT/ML 100 UNIT/ML
5 SOLUTION INTRAVENOUS
Status: CANCELLED | OUTPATIENT
Start: 2022-03-29

## 2022-03-01 RX ORDER — DIPHENHYDRAMINE HYDROCHLORIDE 50 MG/ML
50 INJECTION INTRAMUSCULAR; INTRAVENOUS
Status: CANCELLED
Start: 2022-04-05

## 2022-03-01 RX ORDER — METHYLPREDNISOLONE SODIUM SUCCINATE 125 MG/2ML
125 INJECTION, POWDER, LYOPHILIZED, FOR SOLUTION INTRAMUSCULAR; INTRAVENOUS
Status: CANCELLED
Start: 2022-04-12

## 2022-03-01 RX ORDER — DIPHENHYDRAMINE HYDROCHLORIDE 50 MG/ML
50 INJECTION INTRAMUSCULAR; INTRAVENOUS
Status: CANCELLED
Start: 2022-04-19

## 2022-03-01 RX ORDER — HEPARIN SODIUM (PORCINE) LOCK FLUSH IV SOLN 100 UNIT/ML 100 UNIT/ML
5 SOLUTION INTRAVENOUS
Status: CANCELLED | OUTPATIENT
Start: 2022-04-19

## 2022-03-01 RX ORDER — HEPARIN SODIUM (PORCINE) LOCK FLUSH IV SOLN 100 UNIT/ML 100 UNIT/ML
5 SOLUTION INTRAVENOUS
Status: CANCELLED | OUTPATIENT
Start: 2022-04-12

## 2022-03-01 RX ORDER — MEPERIDINE HYDROCHLORIDE 25 MG/ML
25 INJECTION INTRAMUSCULAR; INTRAVENOUS; SUBCUTANEOUS EVERY 30 MIN PRN
Status: CANCELLED | OUTPATIENT
Start: 2022-04-26

## 2022-03-01 RX ORDER — MEPERIDINE HYDROCHLORIDE 25 MG/ML
25 INJECTION INTRAMUSCULAR; INTRAVENOUS; SUBCUTANEOUS EVERY 30 MIN PRN
Status: CANCELLED | OUTPATIENT
Start: 2022-03-29

## 2022-03-01 RX ORDER — HEPARIN SODIUM,PORCINE 10 UNIT/ML
5 VIAL (ML) INTRAVENOUS
Status: CANCELLED | OUTPATIENT
Start: 2022-04-05

## 2022-03-01 RX ORDER — DIPHENHYDRAMINE HYDROCHLORIDE 50 MG/ML
50 INJECTION INTRAMUSCULAR; INTRAVENOUS
Status: CANCELLED
Start: 2022-04-12

## 2022-03-01 RX ORDER — ALBUTEROL SULFATE 90 UG/1
1-2 AEROSOL, METERED RESPIRATORY (INHALATION)
Status: CANCELLED
Start: 2022-04-12

## 2022-03-01 RX ORDER — METHYLPREDNISOLONE SODIUM SUCCINATE 125 MG/2ML
125 INJECTION, POWDER, LYOPHILIZED, FOR SOLUTION INTRAMUSCULAR; INTRAVENOUS
Status: CANCELLED
Start: 2022-04-19

## 2022-03-01 RX ORDER — ACETAMINOPHEN 325 MG/1
650 TABLET ORAL
Status: CANCELLED | OUTPATIENT
Start: 2022-03-29

## 2022-03-01 RX ORDER — EPINEPHRINE 1 MG/ML
0.3 INJECTION, SOLUTION INTRAMUSCULAR; SUBCUTANEOUS EVERY 5 MIN PRN
Status: CANCELLED | OUTPATIENT
Start: 2022-04-05

## 2022-03-01 RX ORDER — ALBUTEROL SULFATE 90 UG/1
1-2 AEROSOL, METERED RESPIRATORY (INHALATION)
Status: CANCELLED
Start: 2022-04-19

## 2022-03-01 RX ORDER — DEXAMETHASONE SODIUM PHOSPHATE 10 MG/ML
20 INJECTION, SOLUTION INTRAMUSCULAR; INTRAVENOUS ONCE
Status: COMPLETED | OUTPATIENT
Start: 2022-03-01 | End: 2022-03-01

## 2022-03-01 RX ORDER — DIPHENHYDRAMINE HCL 25 MG
50 CAPSULE ORAL
Status: CANCELLED
Start: 2022-04-12

## 2022-03-01 RX ORDER — ACETAMINOPHEN 325 MG/1
650 TABLET ORAL
Status: CANCELLED | OUTPATIENT
Start: 2022-04-19

## 2022-03-01 RX ORDER — ALBUTEROL SULFATE 90 UG/1
1-2 AEROSOL, METERED RESPIRATORY (INHALATION)
Status: CANCELLED
Start: 2022-04-05

## 2022-03-01 RX ORDER — HEPARIN SODIUM,PORCINE 10 UNIT/ML
5 VIAL (ML) INTRAVENOUS
Status: CANCELLED | OUTPATIENT
Start: 2022-04-12

## 2022-03-01 RX ORDER — ALBUTEROL SULFATE 90 UG/1
1-2 AEROSOL, METERED RESPIRATORY (INHALATION)
Status: CANCELLED
Start: 2022-04-26

## 2022-03-01 RX ORDER — NALOXONE HYDROCHLORIDE 0.4 MG/ML
0.2 INJECTION, SOLUTION INTRAMUSCULAR; INTRAVENOUS; SUBCUTANEOUS
Status: CANCELLED | OUTPATIENT
Start: 2022-04-05

## 2022-03-01 RX ORDER — NALOXONE HYDROCHLORIDE 0.4 MG/ML
0.2 INJECTION, SOLUTION INTRAMUSCULAR; INTRAVENOUS; SUBCUTANEOUS
Status: CANCELLED | OUTPATIENT
Start: 2022-03-29

## 2022-03-01 RX ORDER — DIPHENHYDRAMINE HYDROCHLORIDE 50 MG/ML
50 INJECTION INTRAMUSCULAR; INTRAVENOUS
Status: CANCELLED
Start: 2022-03-29

## 2022-03-01 RX ORDER — METHYLPREDNISOLONE SODIUM SUCCINATE 125 MG/2ML
125 INJECTION, POWDER, LYOPHILIZED, FOR SOLUTION INTRAMUSCULAR; INTRAVENOUS
Status: CANCELLED
Start: 2022-03-29

## 2022-03-01 RX ORDER — ACETAMINOPHEN 325 MG/1
650 TABLET ORAL
Status: CANCELLED | OUTPATIENT
Start: 2022-04-05

## 2022-03-01 RX ORDER — METHYLPREDNISOLONE SODIUM SUCCINATE 125 MG/2ML
125 INJECTION, POWDER, LYOPHILIZED, FOR SOLUTION INTRAMUSCULAR; INTRAVENOUS
Status: CANCELLED
Start: 2022-04-26

## 2022-03-01 RX ORDER — NALOXONE HYDROCHLORIDE 0.4 MG/ML
0.2 INJECTION, SOLUTION INTRAMUSCULAR; INTRAVENOUS; SUBCUTANEOUS
Status: CANCELLED | OUTPATIENT
Start: 2022-04-19

## 2022-03-01 RX ORDER — DIPHENHYDRAMINE HYDROCHLORIDE 50 MG/ML
50 INJECTION INTRAMUSCULAR; INTRAVENOUS
Status: CANCELLED
Start: 2022-04-26

## 2022-03-01 RX ORDER — DIPHENHYDRAMINE HCL 25 MG
50 CAPSULE ORAL
Status: CANCELLED
Start: 2022-03-29

## 2022-03-01 RX ORDER — ALBUTEROL SULFATE 0.83 MG/ML
2.5 SOLUTION RESPIRATORY (INHALATION)
Status: CANCELLED | OUTPATIENT
Start: 2022-04-19

## 2022-03-01 RX ORDER — MEPERIDINE HYDROCHLORIDE 25 MG/ML
25 INJECTION INTRAMUSCULAR; INTRAVENOUS; SUBCUTANEOUS EVERY 30 MIN PRN
Status: CANCELLED | OUTPATIENT
Start: 2022-04-12

## 2022-03-01 RX ORDER — DIPHENHYDRAMINE HCL 25 MG
50 CAPSULE ORAL
Status: CANCELLED
Start: 2022-04-05

## 2022-03-01 RX ADMIN — SODIUM CHLORIDE 200 MG: 9 INJECTION, SOLUTION INTRAVENOUS at 13:09

## 2022-03-01 RX ADMIN — DIPHENHYDRAMINE HYDROCHLORIDE 50 MG: 25 CAPSULE ORAL at 12:47

## 2022-03-01 RX ADMIN — SODIUM CHLORIDE 250 ML: 9 INJECTION, SOLUTION INTRAVENOUS at 12:41

## 2022-03-01 RX ADMIN — DEXAMETHASONE SODIUM PHOSPHATE 20 MG: 10 INJECTION, SOLUTION INTRAMUSCULAR; INTRAVENOUS at 12:48

## 2022-03-01 RX ADMIN — Medication 5 ML: at 15:07

## 2022-03-01 RX ADMIN — FAMOTIDINE 20 MG: 10 INJECTION, SOLUTION INTRAVENOUS at 12:50

## 2022-03-01 RX ADMIN — PACLITAXEL 173 MG: 6 INJECTION, SOLUTION INTRAVENOUS at 13:49

## 2022-03-01 ASSESSMENT — PAIN SCALES - GENERAL: PAINLEVEL: NO PAIN (0)

## 2022-03-01 NOTE — PROGRESS NOTES
[facility] Hematology / Oncology  Progress Note  Name: Di Donaldson  :  1945    MRN:  [id number]    --------------------    Assessment / Plan:  Early-stage, left-sided, hormone negative, HER-2 positive breast cancer:    Proceed w/ week 4/12 taxol/herceptin.  Tolerating treatment well and w/o major toxicities.  Reviewed moisture and vaseline for epistaxis w/ dry air; platelets have been stable.  Reviewed imodium for loose stools w/ herceptin.  Reviewed anticipatory fatigue and neuropathy that can build with treatment.  Planning repeat imaging following completion of therapy.  Cancer genetics planned in future.    Osorio Meyer MD    --------------------    Interval History:  Di returns for follow up of breast cancer.  Dealt with a nosebleed last week; responded to pressure; no recurrence since.  No major nausea, vomiting; little runny stools.  No major neuropathy.  No GERD.  Hair is coming out.  Feeling all in all, pretty well.  Little bit of early fatigue.    --------------------    Physical Exam:  Video visit.    Labs / Imaging / Path:  We reviewed labs     Video Visit:  Di is a 76 year old female who is being evaluated via a billable video visit.  }    Video start time: 8:00 AM  Video end time: 8:13 AM    Provider location: [facility]  Patient location: Home    Mode of transmission:  [employer] / [employer].

## 2022-03-01 NOTE — PROGRESS NOTES
Infusion Nursing Note:  Di Donaldson presents today for Port labs.    Patient seen by provider today: Yes: Virtual visit with Dr. Meyer.    present during visit today: Not Applicable.    Note: Pt here for port draw prior to chemo. See other infusion therapy visit note from today for treatment conditions and other documentation.       Intravenous Access:  Implanted Port.  Lab draw site R chest wall, Needle type Noncoring, Gauge 20,3/4in.  Labs drawn without difficulty.      Carey Bowen RN

## 2022-03-01 NOTE — LETTER
3/1/2022         RE: Di Donaldson  [id number]  [facility] [address]        Dear Colleague,    Thank you for referring your patient, Di Donaldson, to the [facility]. Please see a copy of my visit note below.    [facility] Hematology / Oncology  Progress Note  Name: Di Donaldson  :  1945    MRN:  [id number]    --------------------    Assessment / Plan:  Early-stage, left-sided, hormone negative, HER-2 positive breast cancer:    Proceed w/ week 4/12 taxol/herceptin.  Tolerating treatment well and w/o major toxicities.  Reviewed moisture and vaseline for epistaxis w/ dry air; platelets have been stable.  Reviewed imodium for loose stools w/ herceptin.  Reviewed anticipatory fatigue and neuropathy that can build with treatment.  Planning repeat imaging following completion of therapy.  Cancer genetics planned in future.    Osorio Meyer MD    --------------------    Interval History:  Di returns for follow up of breast cancer.  Dealt with a nosebleed last week; responded to pressure; no recurrence since.  No major nausea, vomiting; little runny stools.  No major neuropathy.  No GERD.  Hair is coming out.  Feeling all in all, pretty well.  Little bit of early fatigue.    --------------------    Physical Exam:  Video visit.    Labs / Imaging / Path:  We reviewed labs     Video Visit:  Di is a 76 year old female who is being evaluated via a billable video visit.  }    Video start time: 8:00 AM  Video end time: 8:13 AM    Provider location: [facility]  Patient location: Home    Mode of transmission:  PassKit / Memoir.        Again, thank you for allowing me to participate in the care of your patient.        Sincerely,        Osorio Meyer MD

## 2022-03-01 NOTE — PROGRESS NOTES
Infusion Nursing Note:  Di Donaldson presents today for C4D1 Traztuzumab/Paclitaxel.    Patient seen by provider today: Yes: Virtual visit with Dr. Meyer.   present during visit today: Not Applicable.    Note: Pt had Visit with Dr. Meyer this morning, no concerns identified. Had mild nosebleed at home last week. No recurrence or active bleeding currently. VSS, afebrile. Denies pain.       Intravenous Access:  Implanted Port.    Treatment Conditions:  Lab Results   Component Value Date    HGB 10.8 (L) 03/01/2022    WBC 4.6 03/01/2022    ANEUTAUTO 3.0 03/01/2022     03/01/2022      Lab Results   Component Value Date    BILITOTAL 0.5 02/07/2022    ALBUMIN 3.7 02/07/2022    ALT 21 02/07/2022    AST 13 02/07/2022     Results reviewed, labs MET treatment parameters, ok to proceed with treatment.  ECHO/MUGA completed 2/2/22  EF 60-65%.      Post Infusion Assessment:  Patient tolerated infusion without incident.  Blood return noted pre and post infusion.  Site patent and intact, free from redness, edema or discomfort.  No evidence of extravasations.  Access discontinued per protocol.       Discharge Plan:   Copy of AVS and lab results reviewed with patient. Patient will return 3/8 for next appointment.  Patient discharged in stable condition accompanied by: son.  Departure Mode: Ambulatory.      Carey Bowen RN

## 2022-03-01 NOTE — PATIENT INSTRUCTIONS
Follow-up as planned.    Osorio Meyer MD.   HPI:    Patient ID: Sal Parr is a 31year old male. HPI  Patient presents with:  Abdominal Pain: RUQ; since march. Pain is 5 out of 10.   almost daily. Not enhanced by food. Review of Systems   Constitutional: Negative.     Respiratory: Negative 02499 units Oral Cap once a week.    0   • Mometasone Furo-Formoterol Fum (DULERA) 200-5 MCG/ACT Inhalation Aerosol Inhale 2 puffs into the lungs 2 (two) times a day.  (Patient not taking: Reported on 9/24/2020 ) 1 Inhaler 5   • amoxicillin 500 MG Oral Cap

## 2022-03-08 ENCOUNTER — INFUSION THERAPY VISIT (OUTPATIENT)
Dept: INFUSION THERAPY | Facility: CLINIC | Age: 77
End: 2022-03-08
Attending: INTERNAL MEDICINE
Payer: COMMERCIAL

## 2022-03-08 VITALS
OXYGEN SATURATION: 100 % | WEIGHT: 215.8 LBS | DIASTOLIC BLOOD PRESSURE: 62 MMHG | BODY MASS INDEX: 33.8 KG/M2 | HEART RATE: 69 BPM | RESPIRATION RATE: 24 BRPM | SYSTOLIC BLOOD PRESSURE: 148 MMHG | TEMPERATURE: 97.5 F

## 2022-03-08 VITALS — DIASTOLIC BLOOD PRESSURE: 74 MMHG | SYSTOLIC BLOOD PRESSURE: 165 MMHG | HEART RATE: 78 BPM

## 2022-03-08 DIAGNOSIS — Z17.1 MALIGNANT NEOPLASM OF UPPER-OUTER QUADRANT OF LEFT BREAST IN FEMALE, ESTROGEN RECEPTOR NEGATIVE (H): Primary | ICD-10-CM

## 2022-03-08 DIAGNOSIS — C50.412 MALIGNANT NEOPLASM OF UPPER-OUTER QUADRANT OF LEFT BREAST IN FEMALE, ESTROGEN RECEPTOR NEGATIVE (H): Primary | ICD-10-CM

## 2022-03-08 LAB
BASOPHILS # BLD AUTO: 0.1 10E3/UL (ref 0–0.2)
BASOPHILS NFR BLD AUTO: 2 %
EOSINOPHIL # BLD AUTO: 0.5 10E3/UL (ref 0–0.7)
EOSINOPHIL NFR BLD AUTO: 8 %
ERYTHROCYTE [DISTWIDTH] IN BLOOD BY AUTOMATED COUNT: 14.2 % (ref 10–15)
HCT VFR BLD AUTO: 30.3 % (ref 35–47)
HGB BLD-MCNC: 10.2 G/DL (ref 11.7–15.7)
IMM GRANULOCYTES # BLD: 0.1 10E3/UL
IMM GRANULOCYTES NFR BLD: 1 %
LYMPHOCYTES # BLD AUTO: 0.7 10E3/UL (ref 0.8–5.3)
LYMPHOCYTES NFR BLD AUTO: 12 %
MCH RBC QN AUTO: 30.4 PG (ref 26.5–33)
MCHC RBC AUTO-ENTMCNC: 33.7 G/DL (ref 31.5–36.5)
MCV RBC AUTO: 90 FL (ref 78–100)
MONOCYTES # BLD AUTO: 0.4 10E3/UL (ref 0–1.3)
MONOCYTES NFR BLD AUTO: 7 %
NEUTROPHILS # BLD AUTO: 4.3 10E3/UL (ref 1.6–8.3)
NEUTROPHILS NFR BLD AUTO: 70 %
NRBC # BLD AUTO: 0 10E3/UL
NRBC BLD AUTO-RTO: 0 /100
PLATELET # BLD AUTO: 212 10E3/UL (ref 150–450)
RBC # BLD AUTO: 3.36 10E6/UL (ref 3.8–5.2)
WBC # BLD AUTO: 6.1 10E3/UL (ref 4–11)

## 2022-03-08 PROCEDURE — 250N000009 HC RX 250: Performed by: INTERNAL MEDICINE

## 2022-03-08 PROCEDURE — 250N000013 HC RX MED GY IP 250 OP 250 PS 637: Performed by: INTERNAL MEDICINE

## 2022-03-08 PROCEDURE — 250N000011 HC RX IP 250 OP 636: Performed by: INTERNAL MEDICINE

## 2022-03-08 PROCEDURE — 96375 TX/PRO/DX INJ NEW DRUG ADDON: CPT

## 2022-03-08 PROCEDURE — 85025 COMPLETE CBC W/AUTO DIFF WBC: CPT | Performed by: INTERNAL MEDICINE

## 2022-03-08 PROCEDURE — 96417 CHEMO IV INFUS EACH ADDL SEQ: CPT

## 2022-03-08 PROCEDURE — 96413 CHEMO IV INFUSION 1 HR: CPT

## 2022-03-08 PROCEDURE — 258N000003 HC RX IP 258 OP 636: Performed by: INTERNAL MEDICINE

## 2022-03-08 RX ORDER — DIPHENHYDRAMINE HCL 25 MG
50 CAPSULE ORAL
Status: DISCONTINUED | OUTPATIENT
Start: 2022-03-08 | End: 2022-03-08 | Stop reason: HOSPADM

## 2022-03-08 RX ORDER — DEXAMETHASONE SODIUM PHOSPHATE 10 MG/ML
20 INJECTION, SOLUTION INTRAMUSCULAR; INTRAVENOUS ONCE
Status: COMPLETED | OUTPATIENT
Start: 2022-03-08 | End: 2022-03-08

## 2022-03-08 RX ORDER — HEPARIN SODIUM (PORCINE) LOCK FLUSH IV SOLN 100 UNIT/ML 100 UNIT/ML
5 SOLUTION INTRAVENOUS
Status: DISCONTINUED | OUTPATIENT
Start: 2022-03-08 | End: 2022-03-08 | Stop reason: HOSPADM

## 2022-03-08 RX ADMIN — SODIUM CHLORIDE 200 MG: 9 INJECTION, SOLUTION INTRAVENOUS at 11:59

## 2022-03-08 RX ADMIN — FAMOTIDINE 20 MG: 10 INJECTION, SOLUTION INTRAVENOUS at 11:37

## 2022-03-08 RX ADMIN — Medication 5 ML: at 14:10

## 2022-03-08 RX ADMIN — PACLITAXEL 173 MG: 6 INJECTION, SOLUTION INTRAVENOUS at 12:51

## 2022-03-08 RX ADMIN — SODIUM CHLORIDE 250 ML: 9 INJECTION, SOLUTION INTRAVENOUS at 11:30

## 2022-03-08 RX ADMIN — DIPHENHYDRAMINE HYDROCHLORIDE 50 MG: 25 CAPSULE ORAL at 11:36

## 2022-03-08 RX ADMIN — DEXAMETHASONE SODIUM PHOSPHATE 20 MG: 10 INJECTION, SOLUTION INTRAMUSCULAR; INTRAVENOUS at 11:41

## 2022-03-08 ASSESSMENT — PAIN SCALES - GENERAL: PAINLEVEL: NO PAIN (0)

## 2022-03-08 NOTE — PROGRESS NOTES
Infusion Nursing Note:  Di Donaldson presents today for labs prior to her chemo treatment today. .    Patient seen by provider today: No   present during visit today: Not Applicable.    Note: patient here for port labs. .      Intravenous Access:  Implanted Port.    Treatment Conditions:  Lab Results   Component Value Date    HGB 10.2 (L) 03/08/2022    WBC 6.1 03/08/2022    ANEUTAUTO 4.3 03/08/2022     03/08/2022      Lab Results   Component Value Date    BILITOTAL 0.3 03/01/2022    ALBUMIN 3.4 03/01/2022    ALT 25 03/01/2022    AST 11 03/01/2022         Post Infusion Assessment:  Will proceed with cancer treatment today. .       Discharge Plan:   Will proceed with treatment .      Britney Tilley RN

## 2022-03-08 NOTE — PROGRESS NOTES
Infusion Nursing Note:  Di Donaldson presents today for Trazimera and Taxol for breast cancer. .    Patient seen by provider today: No   present during visit today: Not Applicable.    Note: Patient states that she has been feeling well. No concerns today. .      Intravenous Access:  Implanted Port.    Treatment Conditions:  Lab Results   Component Value Date    HGB 10.2 (L) 03/08/2022    WBC 6.1 03/08/2022    ANEUTAUTO 4.3 03/08/2022     03/08/2022      Lab Results   Component Value Date    BILITOTAL 0.3 03/01/2022    ALBUMIN 3.4 03/01/2022    ALT 25 03/01/2022    AST 11 03/01/2022     Results reviewed, labs MET treatment parameters, ok to proceed with treatment.      Post Infusion Assessment:  Patient tolerated infusion without incident.  Blood return noted pre and post infusion.  Site patent and intact, free from redness, edema or discomfort.  No evidence of extravasations.  Access discontinued per protocol.       Discharge Plan:   Patient discharged in stable condition accompanied by: friend.  Departure Mode: Ambulatory  Patient has appointment to return on Tuesday March 15th. .      Britney Tilley RN

## 2022-03-15 ENCOUNTER — INFUSION THERAPY VISIT (OUTPATIENT)
Dept: INFUSION THERAPY | Facility: CLINIC | Age: 77
End: 2022-03-15
Attending: INTERNAL MEDICINE
Payer: COMMERCIAL

## 2022-03-15 ENCOUNTER — LAB (OUTPATIENT)
Dept: INFUSION THERAPY | Facility: CLINIC | Age: 77
End: 2022-03-15

## 2022-03-15 VITALS
HEART RATE: 79 BPM | OXYGEN SATURATION: 98 % | RESPIRATION RATE: 20 BRPM | WEIGHT: 214.5 LBS | DIASTOLIC BLOOD PRESSURE: 74 MMHG | BODY MASS INDEX: 33.67 KG/M2 | TEMPERATURE: 98.6 F | HEIGHT: 67 IN | SYSTOLIC BLOOD PRESSURE: 178 MMHG

## 2022-03-15 DIAGNOSIS — C50.412 MALIGNANT NEOPLASM OF UPPER-OUTER QUADRANT OF LEFT BREAST IN FEMALE, ESTROGEN RECEPTOR NEGATIVE (H): Primary | ICD-10-CM

## 2022-03-15 DIAGNOSIS — Z17.1 MALIGNANT NEOPLASM OF UPPER-OUTER QUADRANT OF LEFT BREAST IN FEMALE, ESTROGEN RECEPTOR NEGATIVE (H): Primary | ICD-10-CM

## 2022-03-15 LAB
BASOPHILS # BLD AUTO: 0.1 10E3/UL (ref 0–0.2)
BASOPHILS NFR BLD AUTO: 1 %
EOSINOPHIL # BLD AUTO: 0.4 10E3/UL (ref 0–0.7)
EOSINOPHIL NFR BLD AUTO: 6 %
ERYTHROCYTE [DISTWIDTH] IN BLOOD BY AUTOMATED COUNT: 14.3 % (ref 10–15)
HCT VFR BLD AUTO: 28.8 % (ref 35–47)
HGB BLD-MCNC: 9.8 G/DL (ref 11.7–15.7)
IMM GRANULOCYTES # BLD: 0.1 10E3/UL
IMM GRANULOCYTES NFR BLD: 1 %
LYMPHOCYTES # BLD AUTO: 0.6 10E3/UL (ref 0.8–5.3)
LYMPHOCYTES NFR BLD AUTO: 9 %
MCH RBC QN AUTO: 30.4 PG (ref 26.5–33)
MCHC RBC AUTO-ENTMCNC: 34 G/DL (ref 31.5–36.5)
MCV RBC AUTO: 89 FL (ref 78–100)
MONOCYTES # BLD AUTO: 0.5 10E3/UL (ref 0–1.3)
MONOCYTES NFR BLD AUTO: 7 %
NEUTROPHILS # BLD AUTO: 5.3 10E3/UL (ref 1.6–8.3)
NEUTROPHILS NFR BLD AUTO: 76 %
NRBC # BLD AUTO: 0 10E3/UL
NRBC BLD AUTO-RTO: 0 /100
PLATELET # BLD AUTO: 197 10E3/UL (ref 150–450)
RBC # BLD AUTO: 3.22 10E6/UL (ref 3.8–5.2)
WBC # BLD AUTO: 7 10E3/UL (ref 4–11)

## 2022-03-15 PROCEDURE — 96417 CHEMO IV INFUS EACH ADDL SEQ: CPT

## 2022-03-15 PROCEDURE — 250N000011 HC RX IP 250 OP 636: Performed by: INTERNAL MEDICINE

## 2022-03-15 PROCEDURE — 96413 CHEMO IV INFUSION 1 HR: CPT

## 2022-03-15 PROCEDURE — 85025 COMPLETE CBC W/AUTO DIFF WBC: CPT | Performed by: INTERNAL MEDICINE

## 2022-03-15 PROCEDURE — 258N000003 HC RX IP 258 OP 636: Performed by: INTERNAL MEDICINE

## 2022-03-15 PROCEDURE — 250N000013 HC RX MED GY IP 250 OP 250 PS 637: Performed by: INTERNAL MEDICINE

## 2022-03-15 PROCEDURE — 250N000009 HC RX 250: Performed by: INTERNAL MEDICINE

## 2022-03-15 PROCEDURE — 96375 TX/PRO/DX INJ NEW DRUG ADDON: CPT

## 2022-03-15 RX ORDER — DIPHENHYDRAMINE HCL 25 MG
50 CAPSULE ORAL
Status: DISCONTINUED | OUTPATIENT
Start: 2022-03-15 | End: 2022-03-15 | Stop reason: HOSPADM

## 2022-03-15 RX ORDER — HEPARIN SODIUM (PORCINE) LOCK FLUSH IV SOLN 100 UNIT/ML 100 UNIT/ML
5 SOLUTION INTRAVENOUS
Status: DISCONTINUED | OUTPATIENT
Start: 2022-03-15 | End: 2022-03-15 | Stop reason: HOSPADM

## 2022-03-15 RX ORDER — DEXAMETHASONE SODIUM PHOSPHATE 10 MG/ML
20 INJECTION, SOLUTION INTRAMUSCULAR; INTRAVENOUS
Status: COMPLETED | OUTPATIENT
Start: 2022-03-15 | End: 2022-03-15

## 2022-03-15 RX ADMIN — FAMOTIDINE 20 MG: 10 INJECTION, SOLUTION INTRAVENOUS at 11:50

## 2022-03-15 RX ADMIN — SODIUM CHLORIDE 200 MG: 9 INJECTION, SOLUTION INTRAVENOUS at 12:26

## 2022-03-15 RX ADMIN — Medication 5 ML: at 14:16

## 2022-03-15 RX ADMIN — PACLITAXEL 173 MG: 6 INJECTION, SOLUTION INTRAVENOUS at 13:08

## 2022-03-15 RX ADMIN — SODIUM CHLORIDE 250 ML: 9 INJECTION, SOLUTION INTRAVENOUS at 11:43

## 2022-03-15 RX ADMIN — DEXAMETHASONE SODIUM PHOSPHATE 20 MG: 10 INJECTION, SOLUTION INTRAMUSCULAR; INTRAVENOUS at 11:47

## 2022-03-15 RX ADMIN — DIPHENHYDRAMINE HYDROCHLORIDE 50 MG: 25 CAPSULE ORAL at 11:45

## 2022-03-15 ASSESSMENT — PAIN SCALES - GENERAL: PAINLEVEL: NO PAIN (0)

## 2022-03-15 NOTE — PROGRESS NOTES
Infusion Nursing Note:  Di Donaldson presents today for Port Labs.    Patient seen by provider today: No   present during visit today: Not Applicable.    Note: N/A.      Intravenous Access:  Labs drawn without difficulty.  Implanted Port.    Treatment Conditions:  Not Applicable.      Post Infusion Assessment:  NA.       Discharge Plan:   Awaiting results for treatment.      Altagracia Moss RN

## 2022-03-15 NOTE — PROGRESS NOTES
Infusion Nursing Note:  Di Donaldson presents today for C6 D1 Trazimera and Taxol.    Patient seen by provider today: No   present during visit today: Not Applicable.    Note: Patient denies any new complaints besides had a nosebleed x 2 last week but was able to get them stopped within 5 minutes. No other complaints.      Intravenous Access:  Implanted Port.    Treatment Conditions:  Lab Results   Component Value Date    HGB 9.8 (L) 03/15/2022    WBC 7.0 03/15/2022    ANEUTAUTO 5.3 03/15/2022     03/15/2022      Results reviewed, labs MET treatment parameters, ok to proceed with treatment.      Post Infusion Assessment:  Patient tolerated infusion without incident.  Patient observed for 15 minutes post infusion per protocol.  Blood return noted pre and post infusion.  Site patent and intact, free from redness, edema or discomfort.  No evidence of extravasations.  Access discontinued per protocol.       Discharge Plan:   Discharge instructions reviewed with: Patient.  Patient and/or family verbalized understanding of discharge instructions and all questions answered.  Patient discharged in stable condition accompanied by: self.  Departure Mode: Ambulatory.      Altagracia Moss RN

## 2022-03-21 NOTE — PROGRESS NOTES
Hematology/Oncology Visit  Care Team:  - Oncologist: Dr. Meyer  - PCP: Cat Wharton    Reason for visit: cycle 7 trazimera + paclitaxel    Diagnosis: Stage IA invasive ductal carcinoma, left breast, HR-, HER2+    Cancer and Treatment Hx:  Jan 2022: presented with a palpable left breast mass. Diagnostic US and mammogram with 1.2 x 1.5cm mass at the 4 o'clock position on the left breast, 4cm from the nipple. Left axilla negative for adenopathy. Biopsy on 1/12 revealed grade 2 invasive ductal carcinoma with micropapillary features, DCIS present. No angiolymphatic invasion. ER/MS negative, HER2+. Biopsy of left nipple ulceration was benign.  Feb 2022: initiated neoadjuvant weekly paclitaxel + trazimera.     Interval History:  Di presents with her daughter, Shauna. She has been tolerating chemo well thus far but has noted more fatigue. She had a nosebleed this morning that stopped after holding pressure for a few minutes. No other nosebleeds. Decreased appetite but still eating around 2 meals per day, maintaining weight. Has a BM 1-2 times per week, hard stool. Ongoing diabetic neuropathy in her bilateral feet, maybe slightly worse since starting treatment. No falls. She can still feel L breast mass, unsure if it feels smaller. She has also noticed a scalp rash, not pruritic or painful. Feels ready for chemo today. No questions or concerns.     Medications:  Discussed pertinent medications.    Physical Exam:  GEN: pleasantly conversant female no acute distress  SKIN: scattered papular/pustular rash on scalp  ENT: eyes non-icteric, no notable oral sores  LEFT BREAST: palpated approximately 1cm firm mass 4 o'clock just outside of areola  LYMPH: no notable cervical or supraclavicular lymphadenopathy  RESP: clear to auscultation bilaterally, on room air  CARDS: regular rate and rhythm, no notable murmurs   GI: abd soft without notable hepatosplenomegaly, mildly tender to palpation of LUQ  MSK: no notable lower extremity  "edema  NEURO: alert and oriented without obvious focal deficit, stable gait  /68 (BP Location: Right arm, Patient Position: Sitting, Cuff Size: Adult Regular)   Pulse 93   Temp 97.3  F (36.3  C) (Temporal)   Resp 18   Ht 1.702 m (5' 7\")   Wt 97.2 kg (214 lb 3 oz)   SpO2 97%   BMI 33.55 kg/m       Wt Readings from Last 4 Encounters:   03/22/22 97.2 kg (214 lb 3 oz)   03/15/22 97.3 kg (214 lb 8 oz)   03/08/22 97.9 kg (215 lb 12.8 oz)   03/01/22 97.2 kg (214 lb 4.8 oz)     Labs:  Result Value    WBC Count 6.5    Hemoglobin 9.9 (L)    Platelet Count 225    ANC 4.8     Imaging:  Reviewed read of outside mammogram and ultrasound from 1/6/22    Reviewed read of echo from 2/2/22:  Interpretation Summary  The left ventricle is normal in size.  Left ventricular systolic function is normal.  The visual ejection fraction is 60-65%.  Global peak LV longitudinal strain is averaged at -18.1%. This is within  reported normal limits (normal <-18%).  Mild valvular aortic stenosis.    Assessment and Plan:  Invasive ductal carcinoma left breast, HER2+  Anemia secondary to chemotherapy  - Continues on neoadjuvant treatment with paclitaxel + trazimera   - Meeting goals and labs appropriate for cycle 7 later today  - Echo last 2/2 was WNL  - Appointment with genetic counselor on 4/27  - Will need an appt with surgeon Dr. Srinivasan at the end of neoadjuvant treatment  - Oncology follow up with Dr. Meyer on 4/12    Epistaxis, Intermittent Mild  - Platelet count WNL, recommended application of Aquafor and utilizing a humidifier  - Consider Afrin if ongoing issue    Folliculitis  Alopecia  - Initiate OTC hydrocortisone BID, if no improvement can consider stronger steroid cream or clindamycin cream  - DME provided for wig per pt's request    Constipation  - Discussed initiating stool softeners with goal of daily or every other day soft BM      The total time of this encounter amounted to 30 minutes today. This time includes " face-to-face time spent with the patient, prep work, ordering tests, and performing post-visit documentation.  - Keya Gallo, CNP

## 2022-03-22 ENCOUNTER — LAB (OUTPATIENT)
Dept: INFUSION THERAPY | Facility: CLINIC | Age: 77
End: 2022-03-22
Attending: INTERNAL MEDICINE
Payer: COMMERCIAL

## 2022-03-22 ENCOUNTER — ONCOLOGY VISIT (OUTPATIENT)
Dept: ONCOLOGY | Facility: CLINIC | Age: 77
End: 2022-03-22
Payer: COMMERCIAL

## 2022-03-22 VITALS — HEART RATE: 73 BPM | SYSTOLIC BLOOD PRESSURE: 142 MMHG | DIASTOLIC BLOOD PRESSURE: 68 MMHG

## 2022-03-22 VITALS
HEIGHT: 67 IN | HEART RATE: 93 BPM | OXYGEN SATURATION: 97 % | BODY MASS INDEX: 33.62 KG/M2 | RESPIRATION RATE: 18 BRPM | SYSTOLIC BLOOD PRESSURE: 136 MMHG | DIASTOLIC BLOOD PRESSURE: 68 MMHG | TEMPERATURE: 97.3 F | WEIGHT: 214.19 LBS

## 2022-03-22 DIAGNOSIS — Z17.1 MALIGNANT NEOPLASM OF OVERLAPPING SITES OF LEFT BREAST IN FEMALE, ESTROGEN RECEPTOR NEGATIVE (H): Primary | ICD-10-CM

## 2022-03-22 DIAGNOSIS — Z17.1 MALIGNANT NEOPLASM OF UPPER-OUTER QUADRANT OF LEFT BREAST IN FEMALE, ESTROGEN RECEPTOR NEGATIVE (H): Primary | ICD-10-CM

## 2022-03-22 DIAGNOSIS — R04.0 EPISTAXIS: ICD-10-CM

## 2022-03-22 DIAGNOSIS — C50.812 MALIGNANT NEOPLASM OF OVERLAPPING SITES OF LEFT BREAST IN FEMALE, ESTROGEN RECEPTOR NEGATIVE (H): Primary | ICD-10-CM

## 2022-03-22 DIAGNOSIS — C50.412 MALIGNANT NEOPLASM OF UPPER-OUTER QUADRANT OF LEFT BREAST IN FEMALE, ESTROGEN RECEPTOR NEGATIVE (H): Primary | ICD-10-CM

## 2022-03-22 DIAGNOSIS — L65.9 ALOPECIA: ICD-10-CM

## 2022-03-22 DIAGNOSIS — C50.912 HER2-POSITIVE CARCINOMA OF LEFT BREAST (H): ICD-10-CM

## 2022-03-22 DIAGNOSIS — K59.00 CONSTIPATION, UNSPECIFIED CONSTIPATION TYPE: ICD-10-CM

## 2022-03-22 DIAGNOSIS — Z17.31 HER2-POSITIVE CARCINOMA OF LEFT BREAST (H): ICD-10-CM

## 2022-03-22 LAB
BASOPHILS # BLD AUTO: 0.1 10E3/UL (ref 0–0.2)
BASOPHILS NFR BLD AUTO: 1 %
EOSINOPHIL # BLD AUTO: 0.4 10E3/UL (ref 0–0.7)
EOSINOPHIL NFR BLD AUTO: 6 %
ERYTHROCYTE [DISTWIDTH] IN BLOOD BY AUTOMATED COUNT: 15 % (ref 10–15)
HCT VFR BLD AUTO: 28.9 % (ref 35–47)
HGB BLD-MCNC: 9.9 G/DL (ref 11.7–15.7)
IMM GRANULOCYTES # BLD: 0.1 10E3/UL
IMM GRANULOCYTES NFR BLD: 1 %
LYMPHOCYTES # BLD AUTO: 0.7 10E3/UL (ref 0.8–5.3)
LYMPHOCYTES NFR BLD AUTO: 11 %
MCH RBC QN AUTO: 30.8 PG (ref 26.5–33)
MCHC RBC AUTO-ENTMCNC: 34.3 G/DL (ref 31.5–36.5)
MCV RBC AUTO: 90 FL (ref 78–100)
MONOCYTES # BLD AUTO: 0.4 10E3/UL (ref 0–1.3)
MONOCYTES NFR BLD AUTO: 7 %
NEUTROPHILS # BLD AUTO: 4.8 10E3/UL (ref 1.6–8.3)
NEUTROPHILS NFR BLD AUTO: 74 %
NRBC # BLD AUTO: 0 10E3/UL
NRBC BLD AUTO-RTO: 0 /100
PLATELET # BLD AUTO: 225 10E3/UL (ref 150–450)
RBC # BLD AUTO: 3.21 10E6/UL (ref 3.8–5.2)
WBC # BLD AUTO: 6.5 10E3/UL (ref 4–11)

## 2022-03-22 PROCEDURE — 96375 TX/PRO/DX INJ NEW DRUG ADDON: CPT

## 2022-03-22 PROCEDURE — 250N000009 HC RX 250: Performed by: INTERNAL MEDICINE

## 2022-03-22 PROCEDURE — 250N000011 HC RX IP 250 OP 636: Performed by: INTERNAL MEDICINE

## 2022-03-22 PROCEDURE — 96417 CHEMO IV INFUS EACH ADDL SEQ: CPT

## 2022-03-22 PROCEDURE — 85025 COMPLETE CBC W/AUTO DIFF WBC: CPT | Performed by: INTERNAL MEDICINE

## 2022-03-22 PROCEDURE — 99214 OFFICE O/P EST MOD 30 MIN: CPT | Performed by: NURSE PRACTITIONER

## 2022-03-22 PROCEDURE — 250N000013 HC RX MED GY IP 250 OP 250 PS 637: Performed by: INTERNAL MEDICINE

## 2022-03-22 PROCEDURE — 258N000003 HC RX IP 258 OP 636: Performed by: INTERNAL MEDICINE

## 2022-03-22 PROCEDURE — 96413 CHEMO IV INFUSION 1 HR: CPT

## 2022-03-22 RX ORDER — HEPARIN SODIUM (PORCINE) LOCK FLUSH IV SOLN 100 UNIT/ML 100 UNIT/ML
5 SOLUTION INTRAVENOUS
Status: DISCONTINUED | OUTPATIENT
Start: 2022-03-22 | End: 2022-03-22 | Stop reason: HOSPADM

## 2022-03-22 RX ORDER — DEXAMETHASONE SODIUM PHOSPHATE 10 MG/ML
20 INJECTION, SOLUTION INTRAMUSCULAR; INTRAVENOUS
Status: COMPLETED | OUTPATIENT
Start: 2022-03-22 | End: 2022-03-22

## 2022-03-22 RX ORDER — FERROUS SULFATE 325(65) MG
325 TABLET ORAL
COMMUNITY
End: 2024-06-06

## 2022-03-22 RX ORDER — DIPHENHYDRAMINE HCL 25 MG
50 CAPSULE ORAL
Status: DISCONTINUED | OUTPATIENT
Start: 2022-03-22 | End: 2022-03-22 | Stop reason: HOSPADM

## 2022-03-22 RX ADMIN — PACLITAXEL 173 MG: 6 INJECTION, SOLUTION INTRAVENOUS at 11:30

## 2022-03-22 RX ADMIN — SODIUM CHLORIDE 200 MG: 9 INJECTION, SOLUTION INTRAVENOUS at 10:45

## 2022-03-22 RX ADMIN — FAMOTIDINE 20 MG: 10 INJECTION, SOLUTION INTRAVENOUS at 10:22

## 2022-03-22 RX ADMIN — SODIUM CHLORIDE 250 ML: 9 INJECTION, SOLUTION INTRAVENOUS at 10:11

## 2022-03-22 RX ADMIN — Medication 5 ML: at 12:36

## 2022-03-22 RX ADMIN — DIPHENHYDRAMINE HYDROCHLORIDE 50 MG: 25 CAPSULE ORAL at 10:18

## 2022-03-22 RX ADMIN — DEXAMETHASONE SODIUM PHOSPHATE 20 MG: 10 INJECTION, SOLUTION INTRAMUSCULAR; INTRAVENOUS at 10:20

## 2022-03-22 ASSESSMENT — PAIN SCALES - GENERAL: PAINLEVEL: NO PAIN (0)

## 2022-03-22 NOTE — PROGRESS NOTES
Here for port labs prior to Dr visit/ chemo.  Tolerated well and discharged to Specialty for provider visit.

## 2022-03-22 NOTE — PATIENT INSTRUCTIONS
Today:  March 22, 2022    See current appt. Plan.    If you have any questions or concerns please feel free to call.    If you need to reschedule please call:  Clinic or Lab Appointment - [phone]  Infusion - [phone]  Imaging - [phone]    Kori Newsome RN, BSN  [facility]   [phone]  3/22/2022, 9:04 AM    After Hours Oncology Nurse [facility] - [phone]

## 2022-03-22 NOTE — PROGRESS NOTES
Infusion Nursing Note:  Di Donaldson presents today for C7D1 Traztuzumab/Paclitaxel.    Patient seen by provider today: Yes: CLARENCE Álvarez CNP.    present during visit today: Not Applicable.    Note: Pt arrives for chemo after Oncology office visit. No new symptoms or concerns mentioned today. See lab results below. VSS, afebrile, denies pain. Premeds given, see MAR.       Intravenous Access:  Implanted Port.    Treatment Conditions:  Lab Results   Component Value Date    HGB 9.9 (L) 03/22/2022    WBC 6.5 03/22/2022    ANEUTAUTO 4.8 03/22/2022     03/22/2022      Results reviewed, labs MET treatment parameters, ok to proceed with treatment.  ECHO/MUGA completed 2/2/22  EF 60-65%.      Post Infusion Assessment:  Patient tolerated infusion without incident.  Blood return noted pre and post infusion.  Site patent and intact, free from redness, edema or discomfort.  No evidence of extravasations.  Access discontinued per protocol.       Discharge Plan:   Copy of AVS reviewed with patient. Patient will return 3/29 for next appointment.  Patient discharged in stable condition accompanied by: daughter.  Departure Mode: Ambulatory.      Carey Bowen, BETY

## 2022-03-29 ENCOUNTER — LAB (OUTPATIENT)
Dept: INFUSION THERAPY | Facility: CLINIC | Age: 77
End: 2022-03-29

## 2022-03-29 ENCOUNTER — INFUSION THERAPY VISIT (OUTPATIENT)
Dept: INFUSION THERAPY | Facility: CLINIC | Age: 77
End: 2022-03-29
Attending: INTERNAL MEDICINE
Payer: COMMERCIAL

## 2022-03-29 VITALS
RESPIRATION RATE: 18 BRPM | DIASTOLIC BLOOD PRESSURE: 72 MMHG | HEIGHT: 67 IN | SYSTOLIC BLOOD PRESSURE: 163 MMHG | OXYGEN SATURATION: 97 % | WEIGHT: 211.9 LBS | BODY MASS INDEX: 33.26 KG/M2 | TEMPERATURE: 97.3 F | HEART RATE: 82 BPM

## 2022-03-29 DIAGNOSIS — C50.412 MALIGNANT NEOPLASM OF UPPER-OUTER QUADRANT OF LEFT BREAST IN FEMALE, ESTROGEN RECEPTOR NEGATIVE (H): Primary | ICD-10-CM

## 2022-03-29 DIAGNOSIS — Z17.1 MALIGNANT NEOPLASM OF UPPER-OUTER QUADRANT OF LEFT BREAST IN FEMALE, ESTROGEN RECEPTOR NEGATIVE (H): Primary | ICD-10-CM

## 2022-03-29 LAB
ALBUMIN SERPL-MCNC: 3 G/DL (ref 3.4–5)
ALP SERPL-CCNC: 61 U/L (ref 40–150)
ALT SERPL W P-5'-P-CCNC: 20 U/L (ref 0–50)
AST SERPL W P-5'-P-CCNC: 13 U/L (ref 0–45)
BASOPHILS # BLD AUTO: 0.1 10E3/UL (ref 0–0.2)
BASOPHILS NFR BLD AUTO: 2 %
BILIRUB DIRECT SERPL-MCNC: <0.1 MG/DL (ref 0–0.2)
BILIRUB SERPL-MCNC: 0.3 MG/DL (ref 0.2–1.3)
EOSINOPHIL # BLD AUTO: 0.3 10E3/UL (ref 0–0.7)
EOSINOPHIL NFR BLD AUTO: 5 %
ERYTHROCYTE [DISTWIDTH] IN BLOOD BY AUTOMATED COUNT: 15.5 % (ref 10–15)
HCT VFR BLD AUTO: 26.6 % (ref 35–47)
HGB BLD-MCNC: 9.2 G/DL (ref 11.7–15.7)
IMM GRANULOCYTES # BLD: 0.1 10E3/UL
IMM GRANULOCYTES NFR BLD: 2 %
LYMPHOCYTES # BLD AUTO: 0.5 10E3/UL (ref 0.8–5.3)
LYMPHOCYTES NFR BLD AUTO: 8 %
MCH RBC QN AUTO: 31.2 PG (ref 26.5–33)
MCHC RBC AUTO-ENTMCNC: 34.6 G/DL (ref 31.5–36.5)
MCV RBC AUTO: 90 FL (ref 78–100)
MONOCYTES # BLD AUTO: 0.4 10E3/UL (ref 0–1.3)
MONOCYTES NFR BLD AUTO: 7 %
NEUTROPHILS # BLD AUTO: 4.7 10E3/UL (ref 1.6–8.3)
NEUTROPHILS NFR BLD AUTO: 76 %
NRBC # BLD AUTO: 0 10E3/UL
NRBC BLD AUTO-RTO: 0 /100
PLATELET # BLD AUTO: 222 10E3/UL (ref 150–450)
PROT SERPL-MCNC: 6.1 G/DL (ref 6.8–8.8)
RBC # BLD AUTO: 2.95 10E6/UL (ref 3.8–5.2)
WBC # BLD AUTO: 6 10E3/UL (ref 4–11)

## 2022-03-29 PROCEDURE — 250N000009 HC RX 250: Performed by: INTERNAL MEDICINE

## 2022-03-29 PROCEDURE — 250N000011 HC RX IP 250 OP 636: Performed by: INTERNAL MEDICINE

## 2022-03-29 PROCEDURE — 96367 TX/PROPH/DG ADDL SEQ IV INF: CPT

## 2022-03-29 PROCEDURE — 250N000013 HC RX MED GY IP 250 OP 250 PS 637: Performed by: INTERNAL MEDICINE

## 2022-03-29 PROCEDURE — 85025 COMPLETE CBC W/AUTO DIFF WBC: CPT | Performed by: INTERNAL MEDICINE

## 2022-03-29 PROCEDURE — 258N000003 HC RX IP 258 OP 636: Performed by: INTERNAL MEDICINE

## 2022-03-29 PROCEDURE — 96413 CHEMO IV INFUSION 1 HR: CPT

## 2022-03-29 PROCEDURE — 96375 TX/PRO/DX INJ NEW DRUG ADDON: CPT

## 2022-03-29 PROCEDURE — 80076 HEPATIC FUNCTION PANEL: CPT | Performed by: INTERNAL MEDICINE

## 2022-03-29 RX ORDER — HEPARIN SODIUM (PORCINE) LOCK FLUSH IV SOLN 100 UNIT/ML 100 UNIT/ML
5 SOLUTION INTRAVENOUS
Status: DISCONTINUED | OUTPATIENT
Start: 2022-03-29 | End: 2022-03-29 | Stop reason: HOSPADM

## 2022-03-29 RX ORDER — DIPHENHYDRAMINE HCL 25 MG
50 CAPSULE ORAL
Status: DISCONTINUED | OUTPATIENT
Start: 2022-03-29 | End: 2022-03-29 | Stop reason: HOSPADM

## 2022-03-29 RX ORDER — DEXAMETHASONE SODIUM PHOSPHATE 10 MG/ML
20 INJECTION, SOLUTION INTRAMUSCULAR; INTRAVENOUS ONCE
Status: COMPLETED | OUTPATIENT
Start: 2022-03-29 | End: 2022-03-29

## 2022-03-29 RX ADMIN — PACLITAXEL 173 MG: 6 INJECTION, SOLUTION INTRAVENOUS at 12:56

## 2022-03-29 RX ADMIN — DEXAMETHASONE SODIUM PHOSPHATE 20 MG: 10 INJECTION, SOLUTION INTRAMUSCULAR; INTRAVENOUS at 11:56

## 2022-03-29 RX ADMIN — SODIUM CHLORIDE 250 ML: 9 INJECTION, SOLUTION INTRAVENOUS at 11:15

## 2022-03-29 RX ADMIN — DIPHENHYDRAMINE HYDROCHLORIDE 50 MG: 25 CAPSULE ORAL at 11:54

## 2022-03-29 RX ADMIN — FAMOTIDINE 20 MG: 10 INJECTION, SOLUTION INTRAVENOUS at 12:01

## 2022-03-29 RX ADMIN — Medication 5 ML: at 14:11

## 2022-03-29 RX ADMIN — SODIUM CHLORIDE 200 MG: 9 INJECTION, SOLUTION INTRAVENOUS at 12:08

## 2022-03-29 ASSESSMENT — PAIN SCALES - GENERAL: PAINLEVEL: NO PAIN (0)

## 2022-03-29 NOTE — PROGRESS NOTES
Infusion Nursing Note:  Di Donaldson presents today for Port Labs.    Patient seen by provider today: No   present during visit today: Not Applicable.    Note: N/A.      Intravenous Access:  Labs drawn without difficulty.  Implanted Port.    Treatment Conditions:  Not Applicable.      Post Infusion Assessment:  Blood return noted.  Site patent and intact, free from redness, edema or discomfort.  No evidence of extravasations.       Discharge Plan:   Awaiting labs for possible treatment.      Altagracia Moss RN

## 2022-03-29 NOTE — PROGRESS NOTES
Infusion Nursing Note:  Di Donaldson presents today for C8 D1 Trazimera and Taxol.    Patient seen by provider today: No   present during visit today: Not Applicable.    Note: Patient tolerated infusion well, denies any new complaints at this time.      Intravenous Access:  Implanted Port.    Treatment Conditions:  Lab Results   Component Value Date    HGB 9.2 (L) 03/29/2022    WBC 6.0 03/29/2022    ANEUTAUTO 4.7 03/29/2022     03/29/2022      Lab Results   Component Value Date    BILITOTAL 0.3 03/29/2022    ALBUMIN 3.0 (L) 03/29/2022    ALT 20 03/29/2022    AST 13 03/29/2022     Results reviewed, labs MET treatment parameters, ok to proceed with treatment.      Post Infusion Assessment:  Patient tolerated infusion without incident.  Patient observed for 15 minutes post infusion per protocol.  Blood return noted pre and post infusion.  Site patent and intact, free from redness, edema or discomfort.  No evidence of extravasations.  Access discontinued per protocol.       Discharge Plan:   Discharge instructions reviewed with: Patient.  Patient and/or family verbalized understanding of discharge instructions and all questions answered.  Patient discharged in stable condition accompanied by: self.  Departure Mode: Ambulatory.      Altagracia Moss RN

## 2022-04-05 ENCOUNTER — INFUSION THERAPY VISIT (OUTPATIENT)
Dept: INFUSION THERAPY | Facility: CLINIC | Age: 77
End: 2022-04-05
Attending: INTERNAL MEDICINE
Payer: COMMERCIAL

## 2022-04-05 ENCOUNTER — LAB (OUTPATIENT)
Dept: INFUSION THERAPY | Facility: CLINIC | Age: 77
End: 2022-04-05
Payer: COMMERCIAL

## 2022-04-05 VITALS
BODY MASS INDEX: 32.56 KG/M2 | HEART RATE: 87 BPM | WEIGHT: 207.9 LBS | OXYGEN SATURATION: 97 % | DIASTOLIC BLOOD PRESSURE: 66 MMHG | SYSTOLIC BLOOD PRESSURE: 168 MMHG

## 2022-04-05 DIAGNOSIS — Z17.1 MALIGNANT NEOPLASM OF UPPER-OUTER QUADRANT OF LEFT BREAST IN FEMALE, ESTROGEN RECEPTOR NEGATIVE (H): Primary | ICD-10-CM

## 2022-04-05 DIAGNOSIS — C50.412 MALIGNANT NEOPLASM OF UPPER-OUTER QUADRANT OF LEFT BREAST IN FEMALE, ESTROGEN RECEPTOR NEGATIVE (H): Primary | ICD-10-CM

## 2022-04-05 LAB
BASOPHILS # BLD AUTO: 0.1 10E3/UL (ref 0–0.2)
BASOPHILS NFR BLD AUTO: 2 %
EOSINOPHIL # BLD AUTO: 0.2 10E3/UL (ref 0–0.7)
EOSINOPHIL NFR BLD AUTO: 3 %
ERYTHROCYTE [DISTWIDTH] IN BLOOD BY AUTOMATED COUNT: 15.9 % (ref 10–15)
HCT VFR BLD AUTO: 28.6 % (ref 35–47)
HGB BLD-MCNC: 9.6 G/DL (ref 11.7–15.7)
IMM GRANULOCYTES # BLD: 0.1 10E3/UL
IMM GRANULOCYTES NFR BLD: 2 %
LYMPHOCYTES # BLD AUTO: 0.6 10E3/UL (ref 0.8–5.3)
LYMPHOCYTES NFR BLD AUTO: 7 %
MCH RBC QN AUTO: 31 PG (ref 26.5–33)
MCHC RBC AUTO-ENTMCNC: 33.6 G/DL (ref 31.5–36.5)
MCV RBC AUTO: 92 FL (ref 78–100)
MONOCYTES # BLD AUTO: 0.6 10E3/UL (ref 0–1.3)
MONOCYTES NFR BLD AUTO: 8 %
NEUTROPHILS # BLD AUTO: 6.2 10E3/UL (ref 1.6–8.3)
NEUTROPHILS NFR BLD AUTO: 78 %
NRBC # BLD AUTO: 0 10E3/UL
NRBC BLD AUTO-RTO: 0 /100
PLATELET # BLD AUTO: 242 10E3/UL (ref 150–450)
RBC # BLD AUTO: 3.1 10E6/UL (ref 3.8–5.2)
WBC # BLD AUTO: 7.9 10E3/UL (ref 4–11)

## 2022-04-05 PROCEDURE — 96375 TX/PRO/DX INJ NEW DRUG ADDON: CPT

## 2022-04-05 PROCEDURE — 250N000009 HC RX 250: Performed by: INTERNAL MEDICINE

## 2022-04-05 PROCEDURE — 85025 COMPLETE CBC W/AUTO DIFF WBC: CPT | Performed by: INTERNAL MEDICINE

## 2022-04-05 PROCEDURE — 250N000011 HC RX IP 250 OP 636: Performed by: INTERNAL MEDICINE

## 2022-04-05 PROCEDURE — 96367 TX/PROPH/DG ADDL SEQ IV INF: CPT

## 2022-04-05 PROCEDURE — 258N000003 HC RX IP 258 OP 636: Performed by: INTERNAL MEDICINE

## 2022-04-05 PROCEDURE — 96413 CHEMO IV INFUSION 1 HR: CPT

## 2022-04-05 RX ORDER — HEPARIN SODIUM (PORCINE) LOCK FLUSH IV SOLN 100 UNIT/ML 100 UNIT/ML
5 SOLUTION INTRAVENOUS
Status: DISCONTINUED | OUTPATIENT
Start: 2022-04-05 | End: 2022-04-05 | Stop reason: HOSPADM

## 2022-04-05 RX ORDER — DIPHENHYDRAMINE HYDROCHLORIDE 50 MG/ML
50 INJECTION INTRAMUSCULAR; INTRAVENOUS
Status: COMPLETED | OUTPATIENT
Start: 2022-04-05 | End: 2022-04-05

## 2022-04-05 RX ORDER — DEXAMETHASONE SODIUM PHOSPHATE 10 MG/ML
20 INJECTION, SOLUTION INTRAMUSCULAR; INTRAVENOUS
Status: DISCONTINUED | OUTPATIENT
Start: 2022-04-05 | End: 2022-04-05

## 2022-04-05 RX ADMIN — DEXAMETHASONE SODIUM PHOSPHATE 20 MG: 10 INJECTION, SOLUTION INTRAMUSCULAR; INTRAVENOUS at 12:23

## 2022-04-05 RX ADMIN — SODIUM CHLORIDE 200 MG: 9 INJECTION, SOLUTION INTRAVENOUS at 12:48

## 2022-04-05 RX ADMIN — FAMOTIDINE 20 MG: 10 INJECTION, SOLUTION INTRAVENOUS at 12:15

## 2022-04-05 RX ADMIN — SODIUM CHLORIDE 250 ML: 9 INJECTION, SOLUTION INTRAVENOUS at 11:53

## 2022-04-05 RX ADMIN — PACLITAXEL 173 MG: 6 INJECTION, SOLUTION INTRAVENOUS at 13:33

## 2022-04-05 RX ADMIN — Medication 5 ML: at 14:46

## 2022-04-05 RX ADMIN — DIPHENHYDRAMINE HYDROCHLORIDE 50 MG: 50 INJECTION, SOLUTION INTRAMUSCULAR; INTRAVENOUS at 12:11

## 2022-04-05 NOTE — PROGRESS NOTES
"Infusion Nursing Note:  Di Donaldson presents today for chemotherapy, C9D1.    Patient seen by provider today: No   present during visit today: Not Applicable.    Note: Pt c/o extremely warm feeling in labia area with Dexamethasone administration.  Pt states that for 2 days following Dex administration that she is incontinent of urine\"it just runs out of me\".  Discussed w/ Keya Gallo APRN  And pharmacy.  Order was changed to IVPB.  Administered over 15 mins and tolerated well without incident.       Intravenous Access:  Labs drawn without difficulty.  Implanted Port.    Treatment Conditions:  Lab Results   Component Value Date    HGB 9.6 (L) 04/05/2022    WBC 7.9 04/05/2022    ANEUTAUTO 6.2 04/05/2022     04/05/2022      Results reviewed, labs MET treatment parameters, ok to proceed with treatment.      Post Infusion Assessment:  Patient tolerated infusion without incident.  Patient observed for 15 minutes post chemotherapy  .  Blood return noted pre and post infusion.  Site patent and intact, free from redness, edema or discomfort.  No evidence of extravasations.  IV discontinued prior to discharge.     Discharge Plan:   Discharge instructions reviewed with: Patient.  Patient discharged in stable condition accompanied by: friend.  Departure Mode: Ambulatory.      Jayda Palomares RN                    "

## 2022-04-12 ENCOUNTER — INFUSION THERAPY VISIT (OUTPATIENT)
Dept: INFUSION THERAPY | Facility: CLINIC | Age: 77
End: 2022-04-12
Attending: INTERNAL MEDICINE
Payer: COMMERCIAL

## 2022-04-12 ENCOUNTER — VIRTUAL VISIT (OUTPATIENT)
Dept: ONCOLOGY | Facility: CLINIC | Age: 77
End: 2022-04-12

## 2022-04-12 VITALS
TEMPERATURE: 97.8 F | BODY MASS INDEX: 32.9 KG/M2 | HEART RATE: 76 BPM | HEIGHT: 67 IN | RESPIRATION RATE: 18 BRPM | WEIGHT: 209.6 LBS | DIASTOLIC BLOOD PRESSURE: 79 MMHG | OXYGEN SATURATION: 98 % | SYSTOLIC BLOOD PRESSURE: 148 MMHG

## 2022-04-12 DIAGNOSIS — N39.3 STRESS INCONTINENCE: ICD-10-CM

## 2022-04-12 DIAGNOSIS — T45.1X5A CHEMOTHERAPY-INDUCED NEUROPATHY (H): ICD-10-CM

## 2022-04-12 DIAGNOSIS — C50.412 MALIGNANT NEOPLASM OF UPPER-OUTER QUADRANT OF LEFT BREAST IN FEMALE, ESTROGEN RECEPTOR NEGATIVE (H): Primary | ICD-10-CM

## 2022-04-12 DIAGNOSIS — Z17.31 HER2-POSITIVE CARCINOMA OF LEFT BREAST (H): ICD-10-CM

## 2022-04-12 DIAGNOSIS — Z17.1 MALIGNANT NEOPLASM OF UPPER-OUTER QUADRANT OF LEFT BREAST IN FEMALE, ESTROGEN RECEPTOR NEGATIVE (H): Primary | ICD-10-CM

## 2022-04-12 DIAGNOSIS — C50.812 MALIGNANT NEOPLASM OF OVERLAPPING SITES OF LEFT BREAST IN FEMALE, ESTROGEN RECEPTOR NEGATIVE (H): Primary | ICD-10-CM

## 2022-04-12 DIAGNOSIS — C50.912 HER2-POSITIVE CARCINOMA OF LEFT BREAST (H): ICD-10-CM

## 2022-04-12 DIAGNOSIS — G62.0 CHEMOTHERAPY-INDUCED NEUROPATHY (H): ICD-10-CM

## 2022-04-12 DIAGNOSIS — Z17.1 MALIGNANT NEOPLASM OF OVERLAPPING SITES OF LEFT BREAST IN FEMALE, ESTROGEN RECEPTOR NEGATIVE (H): Primary | ICD-10-CM

## 2022-04-12 LAB
BASOPHILS # BLD AUTO: 0.1 10E3/UL (ref 0–0.2)
BASOPHILS NFR BLD AUTO: 1 %
EOSINOPHIL # BLD AUTO: 0.2 10E3/UL (ref 0–0.7)
EOSINOPHIL NFR BLD AUTO: 3 %
ERYTHROCYTE [DISTWIDTH] IN BLOOD BY AUTOMATED COUNT: 16.2 % (ref 10–15)
HCT VFR BLD AUTO: 27.5 % (ref 35–47)
HGB BLD-MCNC: 9.3 G/DL (ref 11.7–15.7)
IMM GRANULOCYTES # BLD: 0.1 10E3/UL
IMM GRANULOCYTES NFR BLD: 2 %
LYMPHOCYTES # BLD AUTO: 0.6 10E3/UL (ref 0.8–5.3)
LYMPHOCYTES NFR BLD AUTO: 9 %
MCH RBC QN AUTO: 31.1 PG (ref 26.5–33)
MCHC RBC AUTO-ENTMCNC: 33.8 G/DL (ref 31.5–36.5)
MCV RBC AUTO: 92 FL (ref 78–100)
MONOCYTES # BLD AUTO: 0.6 10E3/UL (ref 0–1.3)
MONOCYTES NFR BLD AUTO: 8 %
NEUTROPHILS # BLD AUTO: 5.5 10E3/UL (ref 1.6–8.3)
NEUTROPHILS NFR BLD AUTO: 77 %
NRBC # BLD AUTO: 0 10E3/UL
NRBC BLD AUTO-RTO: 0 /100
PLATELET # BLD AUTO: 249 10E3/UL (ref 150–450)
RBC # BLD AUTO: 2.99 10E6/UL (ref 3.8–5.2)
WBC # BLD AUTO: 7.1 10E3/UL (ref 4–11)

## 2022-04-12 PROCEDURE — 250N000009 HC RX 250: Performed by: INTERNAL MEDICINE

## 2022-04-12 PROCEDURE — 250N000013 HC RX MED GY IP 250 OP 250 PS 637: Performed by: INTERNAL MEDICINE

## 2022-04-12 PROCEDURE — 99214 OFFICE O/P EST MOD 30 MIN: CPT | Mod: 95 | Performed by: INTERNAL MEDICINE

## 2022-04-12 PROCEDURE — 96367 TX/PROPH/DG ADDL SEQ IV INF: CPT

## 2022-04-12 PROCEDURE — 96375 TX/PRO/DX INJ NEW DRUG ADDON: CPT

## 2022-04-12 PROCEDURE — 258N000003 HC RX IP 258 OP 636: Performed by: INTERNAL MEDICINE

## 2022-04-12 PROCEDURE — 250N000011 HC RX IP 250 OP 636: Performed by: INTERNAL MEDICINE

## 2022-04-12 PROCEDURE — 96413 CHEMO IV INFUSION 1 HR: CPT

## 2022-04-12 PROCEDURE — 85025 COMPLETE CBC W/AUTO DIFF WBC: CPT | Performed by: INTERNAL MEDICINE

## 2022-04-12 RX ORDER — DIPHENHYDRAMINE HCL 25 MG
50 CAPSULE ORAL
Status: DISCONTINUED | OUTPATIENT
Start: 2022-04-12 | End: 2022-04-12 | Stop reason: HOSPADM

## 2022-04-12 RX ORDER — HEPARIN SODIUM (PORCINE) LOCK FLUSH IV SOLN 100 UNIT/ML 100 UNIT/ML
5 SOLUTION INTRAVENOUS
Status: DISCONTINUED | OUTPATIENT
Start: 2022-04-12 | End: 2022-04-12 | Stop reason: HOSPADM

## 2022-04-12 RX ADMIN — FAMOTIDINE 20 MG: 10 INJECTION, SOLUTION INTRAVENOUS at 10:47

## 2022-04-12 RX ADMIN — DEXAMETHASONE SODIUM PHOSPHATE 20 MG: 10 INJECTION, SOLUTION INTRAMUSCULAR; INTRAVENOUS at 10:51

## 2022-04-12 RX ADMIN — SODIUM CHLORIDE 250 ML: 9 INJECTION, SOLUTION INTRAVENOUS at 10:43

## 2022-04-12 RX ADMIN — Medication 5 ML: at 13:12

## 2022-04-12 RX ADMIN — PACLITAXEL 120 MG: 6 INJECTION, SOLUTION INTRAVENOUS at 12:01

## 2022-04-12 RX ADMIN — DIPHENHYDRAMINE HYDROCHLORIDE 50 MG: 25 CAPSULE ORAL at 10:44

## 2022-04-12 RX ADMIN — SODIUM CHLORIDE 200 MG: 9 INJECTION, SOLUTION INTRAVENOUS at 11:20

## 2022-04-12 NOTE — PROGRESS NOTES
[facility] Hematology / Oncology  Progress Note  Name: Di Donaldson  :  1945    MRN:  [id number]    --------------------    Assessment / Plan:  Early-stage, left-sided, hormone negative, HER-2 positive breast cancer:    Proceed w/ week 10/12 taxol/herceptin.  Tolerating treatment well but unfortunately neuropathy is setting in and compromising balance.  Will dose reduce taxol to 55 mg/m2 moving forward and may remove if progressive symptoms.  Reviewed using walker for balance issues.  Planning breast MRI (or US if unable to have) to reassess tumor.  Planning surgery w/ Dr. Srinivasan after neoadjuvant therapy.  Adjuvant therapy choice pending time of surgery path findings.  Reinforced PCP discussion for stress incontinence.    Osorio Meyer MD    --------------------    Interval History:  Di returns for follow up of breast cancer.    Right thumb has been aching and sore more recently; feels more aching in joints  Balance has been more of an issue recently as well; more listing to a side when walking.  Difficulties w/ leakage when standing up or bearing down.  No more nosebleeds.  No nausea, vomiting.  Bowels are okay; bit constipated after each treatment.    --------------------    Physical Exam:  Video visit.    Labs / Imaging / Path:  We reviewed labs     Video Visit:  Di is a 76 year old female who is being evaluated via a billable video visit.  }    Video start time: 10:07 AM  Video end time: 10:27 AM    Provider location: [facility]  Patient location: Home    Mode of transmission:  Ti Knight / Quack.

## 2022-04-12 NOTE — PROGRESS NOTES
Infusion Nursing Note:  Di Donaldson presents today for port labs.      Note: Port labs drawn prior to video visit with DR. Meyer..      Intravenous Access:  Labs drawn without difficulty.  Implanted Port.  Purple tube drawn then flushed with 20 ml NS.       Sharmin Colbert RN

## 2022-04-12 NOTE — PATIENT INSTRUCTIONS
ZEHRA:  1) Dose reduction taxol.  2) Breast MRI after last treatment.  3) [facility]JT and Dr. Srinivasan visits after breast MRI to finalize plans.  4) Visit w/ PCP to discuss stress incontinence.    Osorio Meyer MD.

## 2022-04-12 NOTE — PROGRESS NOTES
Infusion Nursing Note:  Di PIÑA Mendoza presents today for C10D1 Trazimera/Taxol.    Patient seen by provider today: Yes: DR. Meyer on video in Speciality clinic.   present during visit today: Not Applicable.    Note: Patient returned from Speciality clinic. Dose reduction on Taxol noted.      Intravenous Access:  Implanted Port.    Treatment Conditions:  Not Applicable.      Post Infusion Assessment:  Patient tolerated infusion without incident.  Blood return noted pre and post infusion.  Site patent and intact, free from redness, edema or discomfort.  Access discontinued per protocol.       Discharge Plan:   Discharge instructions reviewed with: Patient.  Patient discharged in stable condition accompanied by: self.  Departure Mode: Ambulatory.      Sharmin Colbert RN

## 2022-04-14 ENCOUNTER — TELEPHONE (OUTPATIENT)
Dept: SURGERY | Facility: CLINIC | Age: 77
End: 2022-04-14
Payer: COMMERCIAL

## 2022-04-14 ENCOUNTER — TELEPHONE (OUTPATIENT)
Dept: ONCOLOGY | Facility: CLINIC | Age: 77
End: 2022-04-14
Payer: COMMERCIAL

## 2022-04-14 NOTE — TELEPHONE ENCOUNTER
4/14 Called and spoke to patient. She is currently scheduled for follow up appointment.     Liza flanagan Procedure   Orthopedics, Podiatry, Sports Medicine, ENT/Eye Specialties  [facility]   [phone]

## 2022-04-14 NOTE — TELEPHONE ENCOUNTER
Spoke with patient regarding scheduled appointments:      Patient verbalized understanding of appointments. No further questions or concerns. Appointment calendar sent to patient's home.  Kori Newsome RNCC

## 2022-04-14 NOTE — TELEPHONE ENCOUNTER
Please contact patient to scheduled appointment with Dr. Srinivasan after 5/4/22 MRI appointment.    Thank you!    Annalisa

## 2022-04-19 ENCOUNTER — LAB (OUTPATIENT)
Dept: INFUSION THERAPY | Facility: CLINIC | Age: 77
End: 2022-04-19

## 2022-04-19 ENCOUNTER — INFUSION THERAPY VISIT (OUTPATIENT)
Dept: INFUSION THERAPY | Facility: CLINIC | Age: 77
End: 2022-04-19
Attending: INTERNAL MEDICINE
Payer: COMMERCIAL

## 2022-04-19 VITALS
RESPIRATION RATE: 18 BRPM | OXYGEN SATURATION: 96 % | HEIGHT: 67 IN | BODY MASS INDEX: 32.43 KG/M2 | TEMPERATURE: 98 F | SYSTOLIC BLOOD PRESSURE: 126 MMHG | WEIGHT: 206.6 LBS | DIASTOLIC BLOOD PRESSURE: 60 MMHG | HEART RATE: 77 BPM

## 2022-04-19 DIAGNOSIS — C50.412 MALIGNANT NEOPLASM OF UPPER-OUTER QUADRANT OF LEFT BREAST IN FEMALE, ESTROGEN RECEPTOR NEGATIVE (H): Primary | ICD-10-CM

## 2022-04-19 DIAGNOSIS — Z17.1 MALIGNANT NEOPLASM OF UPPER-OUTER QUADRANT OF LEFT BREAST IN FEMALE, ESTROGEN RECEPTOR NEGATIVE (H): Primary | ICD-10-CM

## 2022-04-19 LAB
BASOPHILS # BLD AUTO: 0.1 10E3/UL (ref 0–0.2)
BASOPHILS NFR BLD AUTO: 2 %
EOSINOPHIL # BLD AUTO: 0.2 10E3/UL (ref 0–0.7)
EOSINOPHIL NFR BLD AUTO: 3 %
ERYTHROCYTE [DISTWIDTH] IN BLOOD BY AUTOMATED COUNT: 16.5 % (ref 10–15)
HCT VFR BLD AUTO: 27.6 % (ref 35–47)
HGB BLD-MCNC: 9.2 G/DL (ref 11.7–15.7)
IMM GRANULOCYTES # BLD: 0.1 10E3/UL
IMM GRANULOCYTES NFR BLD: 2 %
LYMPHOCYTES # BLD AUTO: 0.7 10E3/UL (ref 0.8–5.3)
LYMPHOCYTES NFR BLD AUTO: 11 %
MCH RBC QN AUTO: 31.3 PG (ref 26.5–33)
MCHC RBC AUTO-ENTMCNC: 33.3 G/DL (ref 31.5–36.5)
MCV RBC AUTO: 94 FL (ref 78–100)
MONOCYTES # BLD AUTO: 0.4 10E3/UL (ref 0–1.3)
MONOCYTES NFR BLD AUTO: 7 %
NEUTROPHILS # BLD AUTO: 4.6 10E3/UL (ref 1.6–8.3)
NEUTROPHILS NFR BLD AUTO: 75 %
NRBC # BLD AUTO: 0 10E3/UL
NRBC BLD AUTO-RTO: 0 /100
PLATELET # BLD AUTO: 222 10E3/UL (ref 150–450)
RBC # BLD AUTO: 2.94 10E6/UL (ref 3.8–5.2)
WBC # BLD AUTO: 6.1 10E3/UL (ref 4–11)

## 2022-04-19 PROCEDURE — 96367 TX/PROPH/DG ADDL SEQ IV INF: CPT

## 2022-04-19 PROCEDURE — 96413 CHEMO IV INFUSION 1 HR: CPT

## 2022-04-19 PROCEDURE — 250N000009 HC RX 250: Performed by: INTERNAL MEDICINE

## 2022-04-19 PROCEDURE — 85025 COMPLETE CBC W/AUTO DIFF WBC: CPT | Performed by: INTERNAL MEDICINE

## 2022-04-19 PROCEDURE — 250N000011 HC RX IP 250 OP 636: Performed by: INTERNAL MEDICINE

## 2022-04-19 PROCEDURE — 258N000003 HC RX IP 258 OP 636: Performed by: INTERNAL MEDICINE

## 2022-04-19 PROCEDURE — 250N000013 HC RX MED GY IP 250 OP 250 PS 637: Performed by: INTERNAL MEDICINE

## 2022-04-19 PROCEDURE — 96375 TX/PRO/DX INJ NEW DRUG ADDON: CPT

## 2022-04-19 RX ORDER — HEPARIN SODIUM (PORCINE) LOCK FLUSH IV SOLN 100 UNIT/ML 100 UNIT/ML
5 SOLUTION INTRAVENOUS
Status: DISCONTINUED | OUTPATIENT
Start: 2022-04-19 | End: 2022-04-19 | Stop reason: HOSPADM

## 2022-04-19 RX ORDER — ACETAMINOPHEN 325 MG/1
650 TABLET ORAL
Status: DISCONTINUED | OUTPATIENT
Start: 2022-04-19 | End: 2022-04-19 | Stop reason: HOSPADM

## 2022-04-19 RX ORDER — DIPHENHYDRAMINE HCL 25 MG
50 CAPSULE ORAL
Status: DISCONTINUED | OUTPATIENT
Start: 2022-04-19 | End: 2022-04-19 | Stop reason: HOSPADM

## 2022-04-19 RX ADMIN — Medication 5 ML: at 14:42

## 2022-04-19 RX ADMIN — DEXAMETHASONE SODIUM PHOSPHATE 20 MG: 10 INJECTION, SOLUTION INTRAMUSCULAR; INTRAVENOUS at 12:17

## 2022-04-19 RX ADMIN — PACLITAXEL 120 MG: 6 INJECTION, SOLUTION INTRAVENOUS at 13:31

## 2022-04-19 RX ADMIN — SODIUM CHLORIDE 200 MG: 9 INJECTION, SOLUTION INTRAVENOUS at 12:52

## 2022-04-19 RX ADMIN — FAMOTIDINE 20 MG: 10 INJECTION, SOLUTION INTRAVENOUS at 11:54

## 2022-04-19 RX ADMIN — SODIUM CHLORIDE 250 ML: 9 INJECTION, SOLUTION INTRAVENOUS at 11:30

## 2022-04-19 RX ADMIN — DIPHENHYDRAMINE HYDROCHLORIDE 50 MG: 25 CAPSULE ORAL at 11:49

## 2022-04-19 ASSESSMENT — PAIN SCALES - GENERAL: PAINLEVEL: NO PAIN (0)

## 2022-04-19 NOTE — PROGRESS NOTES
Infusion Nursing Note:  Di Donaldson presents today for C11 D1 Trazimera/Taxol.    Patient seen by provider today: No   present during visit today: Not Applicable.    Note: Premeds given 30 min prior to infusion and patient tolerated it well, denies any new complaints.      Intravenous Access:  Labs drawn without difficulty.  Implanted Port.    Treatment Conditions:  Lab Results   Component Value Date    HGB 9.2 (L) 04/19/2022    WBC 6.1 04/19/2022    ANEUTAUTO 4.6 04/19/2022     04/19/2022      Results reviewed, labs MET treatment parameters, ok to proceed with treatment.      Post Infusion Assessment:  Patient tolerated infusion without incident.  Patient observed for 15 minutes post infusion per protocol.  Blood return noted pre and post infusion.  Site patent and intact, free from redness, edema or discomfort.  No evidence of extravasations.  Access discontinued per protocol.       Discharge Plan:   Discharge instructions reviewed with: Patient.  Patient and/or family verbalized understanding of discharge instructions and all questions answered.  Patient discharged in stable condition accompanied by: .  Departure Mode: Ambulatory and cane.      Altagracia Moss, RN, OCN

## 2022-04-19 NOTE — PROGRESS NOTES
Infusion Nursing Note:  Di Donaldson presents today for Port Labs.    Patient seen by provider today: No   present during visit today: Not Applicable.    Note: N/A.      Intravenous Access:  Labs drawn without difficulty.  Implanted Port.    Treatment Conditions:  Not Applicable.      Post Infusion Assessment:  Blood return noted.  Site patent and intact, free from redness, edema or discomfort.  No evidence of extravasations.       Discharge Plan:   Awaiting lab results for possible treatment today.      Altagracia Moss RN, OCN

## 2022-04-20 ENCOUNTER — PATIENT OUTREACH (OUTPATIENT)
Dept: ONCOLOGY | Facility: CLINIC | Age: 77
End: 2022-04-20
Payer: COMMERCIAL

## 2022-04-26 ENCOUNTER — INFUSION THERAPY VISIT (OUTPATIENT)
Dept: INFUSION THERAPY | Facility: CLINIC | Age: 77
End: 2022-04-26
Attending: INTERNAL MEDICINE
Payer: COMMERCIAL

## 2022-04-26 VITALS
WEIGHT: 210.2 LBS | BODY MASS INDEX: 32.92 KG/M2 | TEMPERATURE: 98.9 F | RESPIRATION RATE: 16 BRPM | SYSTOLIC BLOOD PRESSURE: 150 MMHG | HEART RATE: 73 BPM | OXYGEN SATURATION: 97 % | DIASTOLIC BLOOD PRESSURE: 68 MMHG

## 2022-04-26 DIAGNOSIS — Z17.1 MALIGNANT NEOPLASM OF UPPER-OUTER QUADRANT OF LEFT BREAST IN FEMALE, ESTROGEN RECEPTOR NEGATIVE (H): Primary | ICD-10-CM

## 2022-04-26 DIAGNOSIS — C50.412 MALIGNANT NEOPLASM OF UPPER-OUTER QUADRANT OF LEFT BREAST IN FEMALE, ESTROGEN RECEPTOR NEGATIVE (H): Primary | ICD-10-CM

## 2022-04-26 LAB
BASOPHILS # BLD AUTO: 0.1 10E3/UL (ref 0–0.2)
BASOPHILS NFR BLD AUTO: 1 %
EOSINOPHIL # BLD AUTO: 0.2 10E3/UL (ref 0–0.7)
EOSINOPHIL NFR BLD AUTO: 3 %
ERYTHROCYTE [DISTWIDTH] IN BLOOD BY AUTOMATED COUNT: 16.3 % (ref 10–15)
HCT VFR BLD AUTO: 30.2 % (ref 35–47)
HGB BLD-MCNC: 10 G/DL (ref 11.7–15.7)
IMM GRANULOCYTES # BLD: 0.1 10E3/UL
IMM GRANULOCYTES NFR BLD: 2 %
LYMPHOCYTES # BLD AUTO: 0.9 10E3/UL (ref 0.8–5.3)
LYMPHOCYTES NFR BLD AUTO: 12 %
MCH RBC QN AUTO: 31.3 PG (ref 26.5–33)
MCHC RBC AUTO-ENTMCNC: 33.1 G/DL (ref 31.5–36.5)
MCV RBC AUTO: 95 FL (ref 78–100)
MONOCYTES # BLD AUTO: 0.5 10E3/UL (ref 0–1.3)
MONOCYTES NFR BLD AUTO: 7 %
NEUTROPHILS # BLD AUTO: 5.3 10E3/UL (ref 1.6–8.3)
NEUTROPHILS NFR BLD AUTO: 75 %
NRBC # BLD AUTO: 0 10E3/UL
NRBC BLD AUTO-RTO: 0 /100
PLATELET # BLD AUTO: 226 10E3/UL (ref 150–450)
RBC # BLD AUTO: 3.19 10E6/UL (ref 3.8–5.2)
WBC # BLD AUTO: 7.1 10E3/UL (ref 4–11)

## 2022-04-26 PROCEDURE — 258N000003 HC RX IP 258 OP 636: Performed by: INTERNAL MEDICINE

## 2022-04-26 PROCEDURE — 96367 TX/PROPH/DG ADDL SEQ IV INF: CPT

## 2022-04-26 PROCEDURE — 85025 COMPLETE CBC W/AUTO DIFF WBC: CPT | Performed by: INTERNAL MEDICINE

## 2022-04-26 PROCEDURE — 250N000009 HC RX 250: Performed by: INTERNAL MEDICINE

## 2022-04-26 PROCEDURE — 96413 CHEMO IV INFUSION 1 HR: CPT

## 2022-04-26 PROCEDURE — 250N000013 HC RX MED GY IP 250 OP 250 PS 637: Performed by: INTERNAL MEDICINE

## 2022-04-26 PROCEDURE — 250N000011 HC RX IP 250 OP 636: Mod: JW | Performed by: INTERNAL MEDICINE

## 2022-04-26 PROCEDURE — 96375 TX/PRO/DX INJ NEW DRUG ADDON: CPT

## 2022-04-26 RX ORDER — DIPHENHYDRAMINE HCL 25 MG
50 CAPSULE ORAL
Status: DISCONTINUED | OUTPATIENT
Start: 2022-04-26 | End: 2022-04-26 | Stop reason: HOSPADM

## 2022-04-26 RX ORDER — HEPARIN SODIUM (PORCINE) LOCK FLUSH IV SOLN 100 UNIT/ML 100 UNIT/ML
5 SOLUTION INTRAVENOUS
Status: DISCONTINUED | OUTPATIENT
Start: 2022-04-26 | End: 2022-04-26 | Stop reason: HOSPADM

## 2022-04-26 RX ADMIN — SODIUM CHLORIDE 200 MG: 9 INJECTION, SOLUTION INTRAVENOUS at 12:27

## 2022-04-26 RX ADMIN — FAMOTIDINE 20 MG: 10 INJECTION, SOLUTION INTRAVENOUS at 11:56

## 2022-04-26 RX ADMIN — SODIUM CHLORIDE 250 ML: 9 INJECTION, SOLUTION INTRAVENOUS at 11:55

## 2022-04-26 RX ADMIN — PACLITAXEL 120 MG: 6 INJECTION, SOLUTION INTRAVENOUS at 13:06

## 2022-04-26 RX ADMIN — DIPHENHYDRAMINE HYDROCHLORIDE 50 MG: 25 CAPSULE ORAL at 11:56

## 2022-04-26 RX ADMIN — DEXAMETHASONE SODIUM PHOSPHATE 20 MG: 10 INJECTION, SOLUTION INTRAMUSCULAR; INTRAVENOUS at 12:00

## 2022-04-26 RX ADMIN — Medication 5 ML: at 14:24

## 2022-04-26 ASSESSMENT — PAIN SCALES - GENERAL: PAINLEVEL: NO PAIN (0)

## 2022-04-26 NOTE — PROGRESS NOTES
Infusion Nursing Note:  Di Donaldson presents today for C12D1.    Patient seen by provider today: No   present during visit today: Not Applicable.    Note: Pt presents to the infusion center for her last round of treatment.  Pt has been feeling okay.  States that as soon as she starts feeling better that is when she is due for the next round of chemo.  Pt over the course of treatment has noticed that her balance has been off.  She has not fallen but does feel she needs to walk with a walking stick for better balance.  Premeds given today prior to infusion. Pt does have a hx of a reaction with her first treatment      Intravenous Access:  Implanted Port.    Treatment Conditions:  Lab Results   Component Value Date    HGB 10.0 (L) 04/26/2022    WBC 7.1 04/26/2022    ANEUTAUTO 5.3 04/26/2022     04/26/2022      Results reviewed, labs MET treatment parameters, ok to proceed with treatment.      Post Infusion Assessment:  Patient tolerated infusion without incident.  Patient observed for 15 minutes post infusion per protocol.  Blood return noted pre and post infusion.  Site patent and intact, free from redness, edema or discomfort.  No evidence of extravasations.  Access discontinued per protocol.       Discharge Plan:   Patient discharged in stable condition accompanied by: self.  Departure Mode: Ambulatory  Patient does not have any further appointments in infusion.  Calendar of her other appointments given to her today..      Britney Tilely RN

## 2022-04-26 NOTE — PROGRESS NOTES
Infusion Nursing Note:  Di Donaldson presents today for labs and probable treatment.    Patient seen by provider today: No   present during visit today: Not Applicable.    Note: Patient here today and if lab parameters are met she will get treatment.  .      Intravenous Access:  Implanted Port.    Treatment Conditions:  Not Applicable.      Post Infusion Assessment:  Blood return noted.   Site patent and intact, free from redness, edema or discomfort.   Saline locked until labs are back.       Discharge Plan:   Will plan for treatment today if order parameters are met. .      Britney Tilley RN

## 2022-04-27 ENCOUNTER — VIRTUAL VISIT (OUTPATIENT)
Dept: ONCOLOGY | Facility: CLINIC | Age: 77
End: 2022-04-27
Attending: INTERNAL MEDICINE
Payer: COMMERCIAL

## 2022-04-27 DIAGNOSIS — Z80.3 FAMILY HISTORY OF MALIGNANT NEOPLASM OF BREAST: ICD-10-CM

## 2022-04-27 DIAGNOSIS — Z17.1 MALIGNANT NEOPLASM OF OVERLAPPING SITES OF LEFT BREAST IN FEMALE, ESTROGEN RECEPTOR NEGATIVE (H): Primary | ICD-10-CM

## 2022-04-27 DIAGNOSIS — C50.812 MALIGNANT NEOPLASM OF OVERLAPPING SITES OF LEFT BREAST IN FEMALE, ESTROGEN RECEPTOR NEGATIVE (H): Primary | ICD-10-CM

## 2022-04-27 DIAGNOSIS — Z80.0 FAMILY HISTORY OF COLON CANCER: ICD-10-CM

## 2022-04-27 PROCEDURE — 96040 HC GENETIC COUNSELING, EACH 30 MINUTES: CPT | Mod: TEL,95 | Performed by: GENETIC COUNSELOR, MS

## 2022-04-27 NOTE — LETTER
4/27/2022         RE: Di Donaldson  [id number] [place] [address]        Dear Colleague,    Thank you for referring your patient, Di Donaldson, to the [facility]. Please see a copy of my visit note below.    Di is a 76 year old who is being evaluated via a billable telephone visit.      What phone number would you like to be contacted at? [phone]    How would you like to obtain your AVS? Ham HUNTLEY      4/27/2022    Referring Provider: Osorio Meyer MD    Presenting Information:   I met with Di Donaldson today for genetic counseling at the Cancer Risk Management Program (telephone visit) to discuss her personal and family history of breast cancer.  She is here today to review this history, cancer screening recommendations, and available genetic testing options.    Personal History:  Di is a 76 year old female. She was diagnosed with hormone receptor negative  HER2 positive breast cancer being treated with neoadjuvant therapy.  MRI is scheduled 5/4/2022.      Family History: (Please see scanned pedigree for detailed family history information)    Di has three sisters who were diagnosed with breast cancer in their 50's-60's.  One sister who was diagnosed with breast cancer also had a history of colon cancer in her 70's    One niece was diagnosed with breast cancer    One brother had a history of many types of cancer (primary cancer unknown).     Her mother was diagnosed with breast cancer in her 40's    Discussion:    Di's personal and family history of breast cancer is suggestive of a hereditary cancer syndrome.    We reviewed the features of sporadic, familial, and hereditary cancers. In looking at Di's family history, it is possible that a cancer susceptibility gene is present due to her personal and family history of breast cancer (including her mother diagnosed at an early age, three sisters diagnosed with breast cancer  and one niece diagnosed with breast cancer). Based on her personal and family history, Di meets current National Comprehensive Cancer Network (NCCN) criteria for genetic testing of high penetrance breast cancer susceptibility genes (including BRCA1, BRCA2, PALB2, PTEN, CDH1, TP53).      We discussed the natural history and genetics of hereditary breast cancer, including BRCA1 and BRCA2. A detailed handout regarding hereditary breast cancer and the information we discussed can be found in the after visit summary. Topics included: inheritance pattern, cancer risks, cancer screening recommendations, and also risks, benefits and limitations of testing.     We discussed that there are additional genes that could cause increased risk for cancer. As many of these genes present with overlapping features in a family and accurate cancer risk cannot always be established based upon the pedigree analysis alone, it would be reasonable for Di to consider panel genetic testing to analyze multiple genes at once.    Di elected to proceed with a common cancers panel through [facility]. A prior authorization will be initiated. Verbal consent was obtained (over virtual visit).     Genes analyzed: APC, KELSI, AXIN2, BAP1, BARD1, BMPR1A, BRCA1, BRCA2, BRIP1, CDH1, CDK4, CDKN2A, CHEK2, DICER1, EPCAM, GREM1, HOXB13, MITF, MLH1, MRE11A, MSH2, MSH3, MSH6, MUTYH, NBN, NF1, NTHL1, PALB2, PMS2, POLD1, POLE, POT1, PTEN, RAD50, RAD51C, RAD51D, SMAD4, SMARCA4, STK11, TP53      Medical Management: We reviewed that the information from genetic testing may determine additional cancer screening, options for risk reducing surgeries, and possible targeted chemotherapies (i.e. immunotherapy for individuals with Salvador syndrome, PARP inhibitors, etc.) for Di and/or her family. These recommendations and possible targeted chemotherapies will be discussed in detail once genetic testing is completed.     Plan:  1) Today  Di elected to proceed with the 40 gene hereditary cancer panel offered through the [facility].  A prior authorization will be initiated.    2)Di will be contacted by our genomics  once prior authorization is obtained.    3) Results from genetic testing will be discussed during a follow up appointment (to be scheduled once testing is completed). Test turn around time: 4-6 weeks.    Time spent over phone: 25 minutes          Again, thank you for allowing me to participate in the care of your patient.      Sincerely,    Gretchen Galvin GC

## 2022-04-27 NOTE — PROGRESS NOTES
4/27/2022    Referring Provider: Osorio Meyer MD    Presenting Information:   I met with Di Donaldson today for genetic counseling at the Cancer Risk Management Program (telephone visit) to discuss her personal and family history of breast cancer.  She is here today to review this history, cancer screening recommendations, and available genetic testing options.    Personal History:  Di is a 76 year old female. She was diagnosed with hormone receptor negative  HER2 positive breast cancer being treated with neoadjuvant therapy.  MRI is scheduled 5/4/2022.      Family History: (Please see scanned pedigree for detailed family history information)    Di has three sisters who were diagnosed with breast cancer in their 50's-60's.  One sister who was diagnosed with breast cancer also had a history of colon cancer in her 70's    One niece was diagnosed with breast cancer    One brother had a history of many types of cancer (primary cancer unknown).     Her mother was diagnosed with breast cancer in her 40's    Discussion:    Di's personal and family history of breast cancer is suggestive of a hereditary cancer syndrome.    We reviewed the features of sporadic, familial, and hereditary cancers. In looking at Di's family history, it is possible that a cancer susceptibility gene is present due to her personal and family history of breast cancer (including her mother diagnosed at an early age, three sisters diagnosed with breast cancer and one niece diagnosed with breast cancer). Based on her personal and family history, Di meets current National Comprehensive Cancer Network (NCCN) criteria for genetic testing of high penetrance breast cancer susceptibility genes (including BRCA1, BRCA2, PALB2, PTEN, CDH1, TP53).      We discussed the natural history and genetics of hereditary breast cancer, including BRCA1 and BRCA2. A detailed handout regarding hereditary breast cancer and the information we discussed  can be found in the after visit summary. Topics included: inheritance pattern, cancer risks, cancer screening recommendations, and also risks, benefits and limitations of testing.     We discussed that there are additional genes that could cause increased risk for cancer. As many of these genes present with overlapping features in a family and accurate cancer risk cannot always be established based upon the pedigree analysis alone, it would be reasonable for Di to consider panel genetic testing to analyze multiple genes at once.    Di elected to proceed with a common cancers panel through the [facility]. A prior authorization will be initiated. Verbal consent was obtained (over virtual visit).     Genes analyzed: APC, KELSI, AXIN2, BAP1, BARD1, BMPR1A, BRCA1, BRCA2, BRIP1, CDH1, CDK4, CDKN2A, CHEK2, DICER1, EPCAM, GREM1, HOXB13, MITF, MLH1, MRE11A, MSH2, MSH3, MSH6, MUTYH, NBN, NF1, NTHL1, PALB2, PMS2, POLD1, POLE, POT1, PTEN, RAD50, RAD51C, RAD51D, SMAD4, SMARCA4, STK11, TP53      Medical Management: We reviewed that the information from genetic testing may determine additional cancer screening, options for risk reducing surgeries, and possible targeted chemotherapies (i.e. immunotherapy for individuals with Salvador syndrome, PARP inhibitors, etc.) for Di and/or her family. These recommendations and possible targeted chemotherapies will be discussed in detail once genetic testing is completed.     Plan:  1) Today Di elected to proceed with the 40 gene hereditary cancer panel offered through the [facility].  A prior authorization will be initiated.    2)Di will be contacted by our genomics  once prior authorization is obtained.    3) Results from genetic testing will be discussed during a follow up appointment (to be scheduled once testing is completed). Test turn around time: 4-6 weeks.    Time spent over phone: 25 minutes    Gretchen Galvin  MS, [facility]  Licensed, Certified Genetic Counselor  [facility]  Phone: [phone]

## 2022-04-27 NOTE — PATIENT INSTRUCTIONS
Assessing Cancer Risk  Only about 5-10% of cancers are thought to be due to an inherited cancer susceptibility gene.    These families often have:  Several people with the same or related types of cancer  Cancers diagnosed at a young age (before age 50)  Individuals with more than one primary cancer  Multiple generations of the family affected with cancer    Some people may be candidates for genetic testing of more than one gene.  For these families, genetic testing using a cancer panel may be offered.  These panels will test different genes known to increase the risk for breast, ovarian, uterine, and/or other cancers. All of the genes discussed below have published clinical management guidelines for individuals who are found to carry a mutation. The purpose of this handout is to serve as a brief summary of the genes analyzed by the panels used to inquire about hereditary breast and gynecologic cancer:  KELSI, BRCA1, BRCA2, BRIP1, CDH1, CHEK2, MLH1, MSH2, MSH6, PMS2, EPCAM, PTEN, PALB2, RAD51C, RAD51D, and TP53.  ______________________________________________________________________________  Hereditary Breast and Ovarian Cancer Syndrome   (BRCA1 and BRCA2)  A single mutation in one of the copies of BRCA1 or BRCA2 increases the risk for breast and ovarian cancer, among others.  The risk for pancreatic cancer and melanoma may also be slightly increased in some families.  The chart below shows the chance that someone with a BRCA mutation would develop cancer in his or her lifetime1,2,3,4.        A person s ethnic background is also important to consider, as individuals of Ashkenazi Oriental orthodox ancestry have a higher chance of having a BRCA gene mutation.  There are three BRCA mutations that occur more frequently in this population.    Salvador Syndrome   (MLH1, MSH2, MSH6, PMS2, and EPCAM)  Currently five genes are known to cause Salvador Syndrome: MLH1, MSH2, MSH6, PMS2, and EPCAM.  A single mutation in one of the Salvador  Syndrome genes increases the risk for colon, endometrial, ovarian, and stomach cancers.  Other cancers that occur less commonly in Salvador Syndrome include urinary tract, skin, and brain cancers.  The chart below shows the chance that a person with Salvador syndrome would develop cancer in his or her lifetime5.      *Cancer risk varies depending on Salvador syndrome gene found    Cowden Syndrome   (PTEN)  Cowden syndrome is a hereditary condition that increases the risk for breast, thyroid, endometrial, colon, and kidney cancer.  Cowden syndrome is caused by a mutation in the PTEN gene.  A single mutation in one of the copies of PTEN causes Cowden syndrome and increases cancer risk.  The chart below shows the chance that someone with a PTEN mutation would develop cancer in their lifetime6,7.  Other benign features seen in some individuals with Cowden syndrome include benign skin lesions (facial papules, keratoses, lipomas), learning disability, autism, thyroid nodules, colon polyps, and larger head size.      *One recent study found breast cancer risk to be increased to 85%    Li-Fraumeni Syndrome   (TP53)  Li-Fraumeni Syndrome (LFS) is a cancer predisposition syndrome caused by a mutation in the TP53 gene. A single mutation in one of the copies of TP53 increases the risk for multiple cancers. Individuals with LFS are at an increased risk for developing cancer at a young age. The lifetime risk for development of a LFS-associated cancer is 50% by age 30 and 90% by age 60.   Core Cancers: Sarcomas, Breast, Brain, Lung, Leukemias/Lymphomas, Adrenocortical carcinomas  Other Cancers: Gastrointestinal, Thyroid, Skin, Genitourinary    Hereditary Diffuse Gastric Cancer   (CDH1)  Currently, one gene is known to cause hereditary diffuse gastric cancer (HDGC): CDH1.  Individuals with HDGC are at increased risk for diffuse gastric cancer and lobular breast cancer. Of people diagnosed with HDGC, 30-50% have a mutation in the CDH1 gene.   This suggests there are likely other genes that may cause HDGC that have not been identified yet.      Lifetime Cancer Risks    General Population HDGC    Diffuse Gastric  <1% ~80%   Breast 12% 39-52%         Additional Genes  KELSI  KELSI is a moderate-risk breast cancer gene. Women who have a mutation in KELSI can have between a 2-4 fold increased risk for breast cancer compared to the general population8. KELSI mutations have also been associated with increased risk for pancreatic cancer, however an estimate of this cancer risk is not well understood9. Individuals who inherit two KELSI mutations have a condition called ataxia-telangiectasia (AT).  This rare autosomal recessive condition affects the nervous system and immune system, and is associated with progressive cerebellar ataxia beginning in childhood.  Individuals with ataxia-telangiectasia often have a weakened immune system and have an increased risk for childhood cancers.    PALB2  Mutations in PALB2 have been shown to increase the risk of breast cancer up to 33-58% in some families; where individuals fall within this risk range is dependent upon family [id number]. PALB2 mutations have also been associated with increased risk for pancreatic cancer, although this risk has not been quantified yet.  Individuals who inherit two PALB2 mutations--one from their mother and one from their father--have a condition called Fanconi Anemia.  This rare autosomal recessive condition is associated with short stature, developmental delay, bone marrow failure, and increased risk for childhood cancers.    CHEK2   CHEK2 is a moderate-risk breast cancer gene.  Women who have a mutation in CHEK2 have around a 2-fold increased risk for breast cancer compared to the general population, and this risk may be higher depending upon family history.11,12,13 Mutations in CHEK2 have also been shown to increase the risk of a number of other cancers, including colon and prostate, however these  cancer risks are currently not well understood.    BRIP1, RAD51C and RAD51D  Mutations in BRIP1, RAD51C, and RAD51D have been shown to increase the risk of ovarian cancer and possibly female breast cancer as well14,15 .       Lifetime Cancer Risk    General Population BRIP1 RAD51C RAD51D   Ovarian 1-2% ~5-8% ~5-9% ~7-15%           Inheritance  All of the cancer syndromes reviewed above are inherited in an autosomal dominant pattern.  This means that if a parent has a mutation, each of his or her children will have a 50% chance of inheriting that same mutation.  Therefore, each child--male or female--would have a 50% chance of being at increased risk for developing cancer.      Image obtained from Genetics Home Reference, 2013     Mutations in some genes can occur de jericho, which means that a person s mutation occurred for the first time in them and was not inherited from a parent.  Now that they have the mutation, however, it can be passed on to future generations.    Genetic Testing  Genetic testing involves a blood test and will look at the genetic information in the KELSI, BRCA1, BRCA2, BRIP1, CDH1, CHEK2, MLH1, MSH2, MSH6, PMS2, EPCAM, PTEN, PALB2, RAD51C, RAD51D, and TP53 genes for any harmful mutations that are associated with increased cancer risk.  If possible, it is recommended that the person(s) who has had cancer be tested before other family members.  That person will give us the most useful information about whether or not a specific gene is associated with the cancer in the family.    Results  There are three possible results of genetic testing:  Positive--a harmful mutation was identified in one or more of the genes  Negative--no mutation was identified in any of the genes on this panel  Variant of unknown significance--a variation in one of the genes was identified, but it is unclear how this impacts cancer risk in the family    Advantages and Disadvantages   There are advantages and disadvantages to  genetic testing.    Advantages  May clarify your cancer risk  Can help you make medical decisions  May explain the cancers in your family  May give useful information to your family members (if you share your results)    Disadvantages  Possible negative emotional impact of learning about inherited cancer risk  Uncertainty in interpreting a negative test result in some situations  Possible genetic discrimination concerns (see below)    Genetic Information Nondiscrimination Act (AUGUSTINA)  [place] is a federal law that protects individuals from health insurance or employment discrimination based on a genetic test result alone.  Although rare, there are currently no legal discrimination protections in terms of life insurance, long term care, or disability insurances.  Visit the [employer] Jolon website to learn more.    Reducing Cancer Risk  All of the genes described above have nationally recognized cancer screening guidelines that would be recommended for individuals who test positive.  In addition to increased cancer screening, surgeries may be offered or recommended to reduce cancer risk.  Recommendations are based upon an individual s genetic test result as well as their personal and family history of cancer.    Questions to Think About Regarding Genetic Testing:  What effect will the test result have on me and my relationship with my family members if I have an inherited gene mutation?  If I don t have a gene mutation?  Should I share my test results, and how will my family react to this news, which may also affect them?  Are my children ready to learn new information that may one day affect their own health?    Hereditary Cancer Resources    FORCE: Facing Our Risk of Cancer Empowered facingourrisk.org   Bright Pink bebrightpink.org   Li-Fraumeni Syndrome Association lfsassociation.org   PTEN World PTENworld.[employer]   No stomach for cancer, Inc. nostomachforcancer.org   Stomach cancer relief network  Scrnet.org   Collaborative Group of the Americas on Inherited Colorectal Cancer (CGA) cgaicc.com    Cancer Care cancercare.org   American Cancer Society (ACS) cancer.org   National Cancer Stites (NCI) cancer.gov     Please call us if you have any questions or concerns.   Cancer Risk Management Program [phone] ([phone])  Carson Hannah, MS [facility] [phone]  Vandana Noble, MS, [facility] [phone]  Sara Torres, MS, [facility]  [phone]  Gretchen Galvin, MS, [facility]  [phone]  Merced Pablo, MS, [facility]  [phone]  Kim Gaines, MS, [facility] [phone]  Angela Shankar, MS, [facility] [phone]    References  Susy Boggs PDP, Marco A S, Mayito PURVIS, Ed JE, Kim JL, Gena N, Susanne H, Annemarie O, Chacha A, Rose B, Hesham P, Manсергей S, Jimmy DM, Pino N, Josemanuel E, María H, Ott E, Lubinski J, Gronwald J, Tito B, Samia H, Thorlacius S, Eerola H, Casper H, Myra K, Aldair OP. Average risks of breast and ovarian cancer associated with BRCA1 or BRCA2 mutations detected in case series unselected for family history: a combined analysis of 222 studies. Am J Hum Kerline. 2003;72:1117-30.  Asa N, Tabitha M, Cheli G.  BRCA1 and BRCA2 Hereditary Breast and Ovarian Cancer. Gene Reviews online. 2013.  Ever YC, Reggie S, Devyn G, Magdaleno S. Breast cancer risk among male BRCA1 and BRCA2 mutation carriers. J Natl Cancer Inst. 2007;99:1811-4.  Esvin GRIFFITH, Beverly I, Syed J, Zoe E, Matt ER, Joanna F. Risk of breast cancer in male BRCA2 carriers. J Med Kerline. 2010;47:710-1.  National Comprehensive Cancer Network. Clinical practice guidelines in oncology, colorectal cancer screening. Available online (registration required). 2015.  Elkin GARCIA, Josie J, Ely J, Georgina LA, Trevor MS, Eng C. Lifetime cancer risks in individuals with germline PTEN mutations. Clin Cancer Res. 2012;18:400-7.  Yolanda KITCHEN. Cowden Syndrome: A Critical Review of the Clinical Literature. J Kerline .  2009:18:13-27.  Nancy A, Saturnino D, Modesta S, Yamilet P, Veronica T, Ria M, Edy B, Silvia H, Elisha R, Jarocho K, Alex L, Esvin DG, Jimmy D, Saul DF, Mohan MR, The Breast Cancer Susceptibility Collaboration () & Flor BONNER. KELSI mutations that cause ataxia-telangiectasia are breast cancer susceptibility alleles. Nature Genetics. 2006;38:873-875  Artis N , Rosalino Y, Ramandeep J, Joe L, Luke GM , Dio ML, Gallinger S, Nguyen AG, Syngal S, Román ML, Edith J , Lyly R, Kwesi SZ, Maggie JR, Chloe VE, Ruddy M, Vobaldo B, Deidre N, Ariadna RH, Rachel KW, and Prabhu AP. KELSI mutations in patients with hereditary pancreatic cancer. Cancer Discover. 2012;2:41-46  Ally VARGAS., et al. Breast-Cancer Risk in Families with Mutations in PALB2. NEJM. 2014; 371(6):497-506.  CHEK2 Breast Cancer Case-Control Consortium. CHEK2*1100delC and susceptibility to breast cancer: A collaborative analysis involving 10,860 breast cancer cases and 9,065 controls from 10 studies. Am J Hum Kerline, 74 (2004), pp. 6805-5239  Eli T, Marycarmen S, Tad K, et al. Spectrum of Mutations in BRCA1, BRCA2, CHEK2, and TP53 in Families at High Risk of Breast Cancer. MAHSA. 2006;295(12):2082-2021.   Dinah C, Arjun D, Fátima A, et al. Risk of breast cancer in women with a CHEK2 mutation with and without a family history of breast cancer. J Clin Oncol. 2011;29:0236-8997.  Dashawn H, Sri E, Sharla SJ, et al. Contribution of germline mutations in the RAD51B, RAD51C, and RAD51D genes to ovarian cancer in the population. J Clin Oncol. 2015;33(26):8056-0169. Doi:10.1200/JCO.2015.61.2408.  Shaneka Tyleron DF, Kasi P, et al. Mutations in BRIP1 confer high risk of ovarian cancer. Albina Kerline. 2011;43(11):8314-5315. doi:10.1038/ng.955.

## 2022-04-27 NOTE — PROGRESS NOTES
Di is a 76 year old who is being evaluated via a billable telephone visit.      What phone number would you like to be contacted at? [phone]    How would you like to obtain your AVS? Ham Munoz VF

## 2022-05-04 ENCOUNTER — ANCILLARY PROCEDURE (OUTPATIENT)
Dept: MRI IMAGING | Facility: CLINIC | Age: 77
End: 2022-05-04
Attending: INTERNAL MEDICINE
Payer: COMMERCIAL

## 2022-05-04 DIAGNOSIS — C50.812 MALIGNANT NEOPLASM OF OVERLAPPING SITES OF LEFT BREAST IN FEMALE, ESTROGEN RECEPTOR NEGATIVE (H): ICD-10-CM

## 2022-05-04 DIAGNOSIS — Z17.31 HER2-POSITIVE CARCINOMA OF LEFT BREAST (H): ICD-10-CM

## 2022-05-04 DIAGNOSIS — C50.912 HER2-POSITIVE CARCINOMA OF LEFT BREAST (H): ICD-10-CM

## 2022-05-04 DIAGNOSIS — Z17.1 MALIGNANT NEOPLASM OF OVERLAPPING SITES OF LEFT BREAST IN FEMALE, ESTROGEN RECEPTOR NEGATIVE (H): ICD-10-CM

## 2022-05-04 PROCEDURE — 77049 MRI BREAST C-+ W/CAD BI: CPT | Performed by: RADIOLOGY

## 2022-05-04 PROCEDURE — A9585 GADOBUTROL INJECTION: HCPCS | Performed by: RADIOLOGY

## 2022-05-04 RX ORDER — GADOBUTROL 604.72 MG/ML
10 INJECTION INTRAVENOUS ONCE
Status: COMPLETED | OUTPATIENT
Start: 2022-05-04 | End: 2022-05-04

## 2022-05-04 RX ADMIN — GADOBUTROL 9.5 ML: 604.72 INJECTION INTRAVENOUS at 16:04

## 2022-05-11 ENCOUNTER — TELEPHONE (OUTPATIENT)
Dept: SURGERY | Facility: CLINIC | Age: 77
End: 2022-05-11

## 2022-05-11 ENCOUNTER — OFFICE VISIT (OUTPATIENT)
Dept: SURGERY | Facility: CLINIC | Age: 77
End: 2022-05-11
Payer: COMMERCIAL

## 2022-05-11 DIAGNOSIS — C50.112 MALIGNANT NEOPLASM OF CENTRAL PORTION OF LEFT BREAST IN FEMALE, ESTROGEN RECEPTOR NEGATIVE (H): Primary | ICD-10-CM

## 2022-05-11 DIAGNOSIS — Z17.1 MALIGNANT NEOPLASM OF CENTRAL PORTION OF LEFT BREAST IN FEMALE, ESTROGEN RECEPTOR NEGATIVE (H): Primary | ICD-10-CM

## 2022-05-11 DIAGNOSIS — E11.9 TYPE 2 DIABETES MELLITUS WITHOUT COMPLICATION, WITH LONG-TERM CURRENT USE OF INSULIN (H): ICD-10-CM

## 2022-05-11 DIAGNOSIS — Z79.4 TYPE 2 DIABETES MELLITUS WITHOUT COMPLICATION, WITH LONG-TERM CURRENT USE OF INSULIN (H): ICD-10-CM

## 2022-05-11 LAB — HBA1C MFR BLD: 5.9 % (ref 0–5.6)

## 2022-05-11 PROCEDURE — 36415 COLL VENOUS BLD VENIPUNCTURE: CPT | Performed by: SURGERY

## 2022-05-11 PROCEDURE — 83036 HEMOGLOBIN GLYCOSYLATED A1C: CPT | Performed by: SURGERY

## 2022-05-11 PROCEDURE — 99215 OFFICE O/P EST HI 40 MIN: CPT | Performed by: SURGERY

## 2022-05-11 RX ORDER — ACETAMINOPHEN 325 MG/1
975 TABLET ORAL ONCE
Status: CANCELLED | OUTPATIENT
Start: 2022-05-11 | End: 2022-05-11

## 2022-05-11 NOTE — PROGRESS NOTES
FOLLOW-UP  May 11, 2022    Di Donaldson is a 76 year old female who returns for follow-up for    Cancer Staging  Malignant neoplasm of upper-outer quadrant of left breast in female, estrogen receptor negative (H)  Staging form: Breast, AJCC 8th Edition  - Clinical: Stage IA (cT1c, cN0, cM0, G2, ER-, MT-, HER2+) - Signed by Ryann Srinivasan MD on 2/1/2022      Treatment to date:  1. Neoadjuvant taxol, Herceptin (2/8/2022 to 4/26/2022)  2. Genetic testing pending    HPI:    Since her last visit, she has completed neoadjuvant systemic therapy. She tolerated this well without significant issues. She can no longer feel the breast mass. She denies any nipple discharge.    Physical Exam  Constitutional:       Appearance: She is well-developed.   Chest:   Breasts: Breasts are symmetrical.      Right: No inverted nipple, mass, nipple discharge, skin change, tenderness, axillary adenopathy or supraclavicular adenopathy.      Left: No inverted nipple, mass, nipple discharge, skin change, tenderness, axillary adenopathy or supraclavicular adenopathy.            Comments: Patient was examined in both supine and upright positions.   Lymphadenopathy:      Cervical: No cervical adenopathy.      Right cervical: No superficial, deep or posterior cervical adenopathy.     Left cervical: No superficial, deep or posterior cervical adenopathy.      Upper Body:      Right upper body: No supraclavicular, axillary or pectoral adenopathy.      Left upper body: No supraclavicular, axillary or pectoral adenopathy.      Comments: No lymphedema in bilateral upper extremities.   Skin:     General: Skin is warm and dry.          INVESTIGATIONS:    Bilateral Breast MRI (5/4/2022) showed:  Findings: Biopsy-proven cancer approximately 4:00 anteriorly on the left previously at ultrasound measured approximately 1.5 x 1.2 x 1.2 cm and now has spread out and is less masslike compared to the previous mammography and ultrasound and now is at  "approximately 0.5 x 0.5 x 2.5 cm. It is still contiguous with the skin undersurface with minimal retraction.  Lymph nodes are symmetric in both axillae today. No internal mammary enlarged nodes are identified. There is a Port-A-Cath artifact upper inner right breast.  Impression: BI-RADS CATEGORY: 6 - Known Biopsy-Proven Malignancy-Appropriate Action Should Be Taken.    ASSESSMENT:    Di Donaldson is a 76 year old female with LEFT breast cancer, s/p neoadjuvant systemic therapy.    I personally reviewed her breast MRI today.    The diagnosis and management of locally advanced breast cancer was discussed with Di Donaldson and her daughter. She completed neoadjuvant chemotherapy on 4/26/2022. We reviewed that surgical resection is still recommended following neoadjuvant therapy, in the form of either breast conservation (segmental mastectomy plus radiation) or mastectomy.  Di Donaldson IS a candidate for breast conservation therapy.  There continues to be skin tethering; I would plan to remove the overlying skin en bloc with the breast tissue.  Because the lesion is not palpable, a radiofrequency identification (RFID) seed-localized approach will be taken.  She would present prior to surgery for an image-guided RFID seed placement, followed by a surgical excision in the operating room.  The risks of a RFID seed-localized segmental mastectomy were discussed with the patient and family, including the risks of bleeding, wound infection, wound dehiscence, and post-operative contour change to the breast.  There is also the risk of needing a repeat surgery (I.e. re-excisional segmental mastectomy) for involved margin(s).  We discussed that breast conservation therapy involves two necessary components: the lumpectomy (or \"segmental mastectomy\"), and several weeks of whole breast radiation therapy.  We also discussed the significance of clear margins and that a subsequent procedure may be necessary to achieve " this. Surgery is performed 4-6 weeks following completion of chemotherapy.     In addition to the surgical management of the breast, her axilla must be addressed.  She was found to be clinically node negative.  She is a candidate for sentinel lymph node biopsy. This is performed with the combination of the radioactive Tilmanocept and lymphazurin. The risks of a sentinel lymph node biopsy were discussed with the patient and family, including the risks of lymphedema (5-10%), bleeding, wound infection, wound dehiscence, seroma formation, and paresthesias. There is also a small risk of anaphylaxis with lymphazurin injection as part of the procedure. There is an approximately 10% false negative rate associated with sentinel lymph node biopsy as published in the literature.  The findings of the sentinel lymph node biopsy may result in the need for further surgery (i.e. Axillary lymph node dissection) versus radiation. There is a 1-2% chance of patients whose sentinel lymph nodes do not map despite dual tracer (radioactive Tilmanocept and lymphazurin). Should this be the case, we discussed that I would proceed with an axillary lymph node dissection at the index procedure if proceeding with mastectomy.      We discussed that surgical pathology results will be reviewed at the postoperative visit to allow for careful discussion of next steps and for answering questions.    She has HER2 amplified breast cancer; additional HER2-targeted therapy is planned in the adjuvant setting. The vascular access port will remain in place.    All of the above were discussed and all questions were answered.  Di Donaldson elected to proceed with LEFT RFID seed-localized segmental mastectomy and LEFT axillary sentinel lymph node mapping and biopsy.      We discussed [facility] as a location for surgery; however, there are admission criteria. I do not see a HbA1c in her chart; we will obtain one today to determine whether  she is a candidate for [facility].    Total time spent with the patient was 30 minutes, of which 75% was counseling.     PLAN:  1. HbA1c  2. LEFT RFID seed-localized segmental mastectomy with overlying skin en bloc  3. LEFT axillary sentinel lymph node mapping and biopsy   4. Patient to report to her PCP for preoperative H&P and testing.  5. Surgery location to be determined depending on HbA1c today  6. Surgery between 5/25 and 6/8 due to timing with systemic therapy    Ryann Srinivasan MD MSc FRCSC FACS  Assistant Professor of Surgery  Division of Surgical Oncology  [facility]     ADDENDUM:  HbA1c was 5.9 - will place surgery orders for [facility]. Patient notified via [facility].    Ryann Srinivasan MD MSc FRCSC FACS  Assistant Professor of Surgery  Division of Surgical Oncology  [facility]     40 minutes spent on the date of the encounter doing chart review, review of test results, interpretation of tests, patient visit, documentation, discussion with other provider(s) and discussion with family.

## 2022-05-11 NOTE — PATIENT INSTRUCTIONS
Surgery Instructions    Always follow your surgeon s instructions. If you don t, your surgery could be cancelled. Please use the following checklist.  Your surgery is on: The surgery scheduler will contact you within 1 week of your consult with the surgeon. If you do not hear from them, please call the clinic or RN Care Coordinator for your provider.    Time: Prearrival times can vary depending on location/type of surgery.  [place] - 2 hour pre-arrival  [facility] [place] - 2 hour pre-arrival  [place] - 1 hour pre-arrival    Note:  These times may change. A nurse will call you before surgery to confirm. If you have not received a call or if you have more questions, please call us on the working day before your surgery:  [place]: [phone] or [phone] (9am to 5:30pm)  [facility]: [phone] (8am to 6pm)  [place]: [phone] (9am to 5pm)  [facility] [phone] (7am to 4pm)  Prior to surgery  Have a pre-op physical exam with your Primary Doctor within 30 days of surgery  Ask your doctor to send all of your results to the surgery center/hospital before surgery. Your doctor also may ask you to bring the results with you on the day of surgery.  Tell your doctor if:  You are allergic to latex or rubber (latex and rubber gloves are often used in medical care).  You are taking any medicines (including aspirin), vitamins, or herbal products. You may need to stop taking some medicines before surgery.  You have any medical problems (allergies, diabetes, or heart disease, for example).  You have a pacemaker or an AICD (automatic implanted cardiac defibrillator). If you do, please bring the ID card with you on the day of surgery.  People who smoke have a higher risk of infection after surgery. Ask your doctor how you can quit smoking.  If you Primary Doctor is not within the [facility] system, you will need to have your pre-op physical faxed to us to be scanned into your chart.  [place]: [phone]  or [phone]  [facility] ([place]): [phone]  [facility] ([facility]): [phone]  Schedule to complete pre-procedure COVID-19 Testing    - All patients MUST get tested for COVID-19. Your test needs to happen 2 to 4 days before you check in to the hospital or surgery site.  - A clinic scheduler will call about a week in advance to set up a testing time at one of our labs. We ll take a swab of your nose or throat.  Note: If you go to a clinic or pharmacy like [facility] or [facility] for your test, make sure you get a test inside the nose. Tests inside the nose are:   - A naso-pharyngeal (NP) RT-PCR test   - An anterior nares RT-PCR test  - Do NOT get a  rapid  test or a saliva (spit) test.  - After the test, please stay at home and stay out of contact with other people. It will be harder for you to recover if you get COVID-19 before your treatment.  Call your insurance company. Ask if you need pre-approval for your surgery. If you do not have insurance, please let us know. If you wish to speak to the , please alert the clinic staff so this can be arranged.  Arrange for someone to drive you home after surgery.  will need to be a responsible adult (18 years or older) that will provide transportation to and from surgery and stay in the waiting room during your surgery. You may not drive yourself or take public transportation to and from surgery.  Arrange for someone to stay with you for 24 hours after you go home. This person must be a responsible adult (18 years or older).  Call your surgeon or their nurse if there is any change in your health (cold, flu, infections, hospitalizations).  Do not smoke, drink alcohol, or take over-the-counter medicine for 24 hours before and after surgery.  If you take prescribed drugs, you may need to stop them until after the surgery.  Discuss what medications to take or not take prior to surgery with your Primary Doctor at your pre-op physical.  Avoid over-the-counter blood-thinning medications such as Aspirin, Ibuprofen, vitamin E, or fish oil 7 days prior to surgery (unless otherwise directed by your Primary Doctor). Tylenol is a good alternative for mild pain relief prior to surgery.  Eating and drinking guidelines prior to surgery:  Stop all solid food consumption 8 hours prior to surgery  You may drink clear liquids up to 2 hours prior to surgery (water, fruit juices without pulp, jello, tea/coffee without creamer, sports drinks, clear-fat free broth (bouillon or consomme), popsicles (without milk, bits of fruit, or seeds/nuts)  Follow instructions given for showering or bathing before surgery.    Use 8 ounces of antiseptic surgical soap, like:  Hibiclens, Scrub Care, or Exidine  You can find it at your local pharmacy, clinic, or retail store. If you have trouble, ask your pharmacist to help you find the right substitute.  Please wash with one of the above soaps twice before coming to the hospital for your surgery. This will decrease bacteria (germs) on your skin. It will also help reduce your chance of infection after surgery.  Items you will need for showerin newly washed washcloths  2 newly washed towels  8 ounces of one of the above soaps  Following these instructions:  The evening before surgery: Shower or bathe as you normally would, using your regular soap and a clean washcloth. Give special attention to places where your incision (surgical cut) or catheters will be. This includes your groin area. Rinse well. You may wash your hair with your regular shampoo. Next, wash your body with 4 ounces of the antiseptic soap. Use a clean, damp washcloth and gently clean your body (from the chin down). If your surgery involves your head, use the special soap on your head and scalp. Rinse well and dry off using a newly washed towel.  The morning of surgery: Repeat the same process as the evening shower.  Other suggestions:  Do not shave within 12  inches of your incision (surgical cut) area for at least 3 days before surgery. Shaving can make small cuts in the skin. This puts you at higher risk of infection.  Wear freshly washed pajamas or clothing after your evening shower.  Wear freshly washed clothes the day of surgery.  Wash and change your bed sheets the day before surgery to have clean bed sheets after your shower and when you get home from surgery.  If you have trouble washing all areas, make sure someone helps you.  Don't use any deodorant, lotion or powder after your shower.  Women who are menstruating should wear a fresh sanitary pad to the hospital.  Do not wear or add deodorant, cologne, lotion, makeup, nail polish or jewelry to surgery. If you wear fake nails, please remove at least one nail before coming to surgery (an oxygen monitor needs to be placed on your finger during surgery).  Bring these items to the surgery center/hospital:  Insurance card  Money for parking and co-pays, if needed  A list of all the medicines you take. Include vitamins, minerals, herbs, and over-the-counter drugs.  Note any drug allergies.  A copy of your advance health care directive, if you have one. This tells us what treatment you would want--and who would make health care decisions--if you could no longer speak for yourself. You may request this form in advance or download it from [email or web address].  A case for glasses, contact lenses, hearing aids, or dentures.  Your inhaler or CPAP machine, if you use these at home.  Leave extra cash, jewelry, and other valuables at home.  When you arrive  When you get to the surgery center/hospital, you will:  Check in. If you are under age 18, you must be with a parent or legal guardian.  Sign consent forms, if you haven t already. These forms state that you know the risks and benefits of surgery. When you sign the forms, you give us permission to do the surgery. Do not sign them unless you understand what will happen  during and after your surgery. If you have any questions about your surgery, ask to speak with your doctor before you sign the forms. If you don t understand the answers, ask again.  Receive a copy of the Patient s Bill of Rights. If you do not receive a copy, please ask for one.  Change into hospital clothes. Your belongings will be placed in a bag. We will return them to you after surgery.  Meet with the anesthesia provider. He or she will tell you what kind of anesthesia (medicine) will be used to keep you comfortable during surgery.  Remember: it s okay to remind doctors and nurses to wash their hands before touching you.  In most cases, your surgeon will use a marker to write his or her initials on the surgery site. This ensures that the exact site is operated on.  For safety reasons, we will ask you the same questions many times. For example, we may ask your name and birth date over and over again.  Friends and family can stay with you until it s time for surgery. While you re in surgery, they will be in the waiting area. Please note that cell phones are not allowed in some patient care areas.  If you have questions about what will happen in the operating room, talk to your care team.  After surgery  We will move you to a recovery room, where we will watch you closely. If you have any pain or discomfort, tell your nurse. He or she will try to make you comfortable.  If you are staying overnight, we will move you to your hospital room after you are awake.  If you are going home, we will move you to another room. Friends and family may be able to join you. The length of time you spend in recovery depend on the type of medicine you received, your medical condition, the type of surgery you had, or your response to the anesthesia given during your procedure.  When you are discharged from the recovery room, the nurses will review instructions with you and your caregiver.  Please wash your hands every time you touch  the wound or change bandages or dressings.  Do not submerge the wound in water.  You may not use a bathtub or hot tub until the wound is closed. The wait time frame is generally 2-3 weeks, but any open area can be a source of incoming bacteria, so it is better to be on the safe side and avoid water submersion until your wound is fully healed.  You may take a shower 24 hours after surgery. Double check with your surgeon if it is OK for water to run over the wound, whether it has been sutured, stapled, glued, or is open. You may gently wash the wound using the antiseptic soap provided for your pre-surgery showering (do not use a washcloth). Any mild soap will work as well.  Many surgical wounds will have small white strips of tape on them called steri-strips.  Do not remove these. The edges will curl and fall off within 7-10 days with normal showering.  If you are going home with sutures (stitches) or staples, you must return to the clinic to have them taken out, usually within 1-2 weeks. Some stitches are dissolvable and do not require removal. Make sure to clarify with your surgeon or surgery nurse reviewing discharge paperwork what kind of sutures you have.  Signs and symptoms of infection include:  Fever, temperature over 101.5   F  Redness  Swelling  Increased pain  Green or yellow drainage which may or may not have a foul odor  Dealing with pain  A nurse will check your comfort level often during your stay. He or she will work with you to manage your pain.  Remember:  All pain is real. There are many ways to control pain. We can help you decide what works best for you.  Ask for pain medicine when you need it. Don t try to  tough it out --this can make you feel worse. Always take your medicine as ordered.  Medicine doesn t work the same for everyone. If your medicine isn t working, tell your nurse. There may be other medicines or treatments we can try.  Going home  We will let you know when you re ready to leave  the surgery center or hospital. Before you leave, we will tell you how to care for yourself at home and prevent infections. If you do not understand something, please say so. We will answer any questions you have. We will then help you get ready to leave.  Remember, you must have a responsible adult (18 years or older) to stay with you 24 hours after you leave the hospital.  Take it easy when you get home. You will need some time to recover--you may be more tired than you realize at first. Rest and relax for at least the first 24 hours at home. You ll feel better and heal faster if you take good care of yourself.  Follow the discharge instructions that are given to you when you leave the surgery center or hospital  Please call the clinic if you experience any problems during regular clinic hours (Monday-Friday 8:00am-5:00pm).  If you experience problems during non-clinic hours, please call the [facility] on-call line at [phone] and ask the  to page the on-call Provider for your specialty. The on-call Provider will call you back and can triage your symptoms and further advise. If you are having an emergency, always call 911 or seek immediate evaluation at the Emergency Room.  Locations  [facility]  Same-day surgery center - 2nd floor, check-in #5  [address] [place] [address]  [phone]  [email or web address] ([facility])  [address] [place] [address]  [phone]   [email or web address]    [address] [address]  [phone] (patient registration)  [phone] (main line)  [email or web address]  [facility]  [address]  [place], [place] [address] [place] [address], 3rd floor for  check-in  [phone] (patient registration)  [phone] (main line)  [email or web address]  [facility]  [address] [place] [address]  [phone]  [email or web address].[email or web address]  [address] [facility] [place] [address]  [phone]  [email or web address]  [facility]  [address] [place] [address]  [phone]  [email or web address]  [facility]  [address] [place] [address]  [phone]  [email or web address]  [facility]  [address] [place] [address]  [phone]  [email or web address]  [facility]  [address] [place] [address]  [phone]  [email or web address]

## 2022-05-11 NOTE — TELEPHONE ENCOUNTER
"  Associated Diagnoses    Malignant neoplasm of central portion of left breast in female, estrogen receptor negative (H) [C50.112, Z17.1]  - Primary               Source Order Set    Order Set Name Order ID    [id number]       Case Request: Case Info      Panel 1      Providers    Provider Role Service   Ryann Srinivasan MD Primary      Procedures    Procedure Laterality Anesthesia Region   LEFT Radiofrequency Identification Seed-Localized Segmental Mastectomy (=\"Lumpectomy\"), LEFT Axillary [place] Lymph Node Biopsy Left Combined General with Block                   Requested date:    Location:  OR   Patient class: Outpatient      Pre-op diagnoses:  Malignant neoplasm of central portion of left breast in female, estrogen receptor negative (H)       Scheduling Instructions    Additional Instructions for the Case     Anesthesia: General with block   Is this a multi surgeon/procedure case?: No   Surgeon Procedure time (incision-close in minutes) 90   Is the patient known to have any of the following?:  N/A and Diabetes   H&P to be completed by?: PCP   Patient Positioning?: Supine   Postop appointment?: 2 weeks   Preoperative wire-loc: No   Preoperative seed-loc: Yes, [facility], prior to surgery   Preoperative isotope: [facility] to inject   PAC Needed: No     Please schedule surgery between 5/25 and 6/8 due to timing with chemotherapy.     Current BMI: BMI Readings from Last 1 Encounters:   04/26/22 : 32.92 kg/m      Annalisa Donaldson LPN    "

## 2022-05-11 NOTE — NURSING NOTE
Di Donaldson's goals for this visit include:   Chief Complaint   Patient presents with     Surgical Followup     RECHECK     Follow up MRI       She requests these members of her care team be copied on today's visit information: Yes    PCP: Osorio Meyer    Referring Provider:  No referring provider defined for this encounter.    There were no vitals taken for this visit.    Do you need any medication refills at today's visit? No    Annalisa Donaldson LPN

## 2022-05-11 NOTE — LETTER
5/11/2022         RE: Di Donaldson  [id number] [place] [address]        Dear Colleague,    Thank you for referring your patient, Di Donaldson, to the [facility]. Please see a copy of my visit note below.    FOLLOW-UP  May 11, 2022    Di Donaldson is a 76 year old female who returns for follow-up for    Cancer Staging  Malignant neoplasm of upper-outer quadrant of left breast in female, estrogen receptor negative (H)  Staging form: Breast, AJCC 8th Edition  - Clinical: Stage IA (cT1c, cN0, cM0, G2, ER-, DC-, HER2+) - Signed by Ryann Srinivasan MD on 2/1/2022      Treatment to date:  1. Neoadjuvant taxol, Herceptin (2/8/2022 to 4/26/2022)  2. Genetic testing pending    HPI:    Since her last visit, she has completed neoadjuvant systemic therapy. She tolerated this well without significant issues. She can no longer feel the breast mass. She denies any nipple discharge.    Physical Exam  Constitutional:       Appearance: She is well-developed.   Chest:   Breasts: Breasts are symmetrical.      Right: No inverted nipple, mass, nipple discharge, skin change, tenderness, axillary adenopathy or supraclavicular adenopathy.      Left: No inverted nipple, mass, nipple discharge, skin change, tenderness, axillary adenopathy or supraclavicular adenopathy.            Comments: Patient was examined in both supine and upright positions.   Lymphadenopathy:      Cervical: No cervical adenopathy.      Right cervical: No superficial, deep or posterior cervical adenopathy.     Left cervical: No superficial, deep or posterior cervical adenopathy.      Upper Body:      Right upper body: No supraclavicular, axillary or pectoral adenopathy.      Left upper body: No supraclavicular, axillary or pectoral adenopathy.      Comments: No lymphedema in bilateral upper extremities.   Skin:     General: Skin is warm and dry.          INVESTIGATIONS:    Bilateral Breast MRI (5/4/2022)  showed:  Findings: Biopsy-proven cancer approximately 4:00 anteriorly on the left previously at ultrasound measured approximately 1.5 x 1.2 x 1.2 cm and now has spread out and is less masslike compared to the previous mammography and ultrasound and now is at approximately 0.5 x 0.5 x 2.5 cm. It is still contiguous with the skin undersurface with minimal retraction.  Lymph nodes are symmetric in both axillae today. No internal mammary enlarged nodes are identified. There is a Port-A-Cath artifact upper inner right breast.  Impression: BI-RADS CATEGORY: 6 - Known Biopsy-Proven Malignancy-Appropriate Action Should Be Taken.    ASSESSMENT:    Di Donaldson is a 76 year old female with LEFT breast cancer, s/p neoadjuvant systemic therapy.    I personally reviewed her breast MRI today.    The diagnosis and management of locally advanced breast cancer was discussed with Di Donaldson and her daughter. She completed neoadjuvant chemotherapy on 4/26/2022. We reviewed that surgical resection is still recommended following neoadjuvant therapy, in the form of either breast conservation (segmental mastectomy plus radiation) or mastectomy.  Di Donaldson IS a candidate for breast conservation therapy.  There continues to be skin tethering; I would plan to remove the overlying skin en bloc with the breast tissue.  Because the lesion is not palpable, a radiofrequency identification (RFID) seed-localized approach will be taken.  She would present prior to surgery for an image-guided RFID seed placement, followed by a surgical excision in the operating room.  The risks of a RFID seed-localized segmental mastectomy were discussed with the patient and family, including the risks of bleeding, wound infection, wound dehiscence, and post-operative contour change to the breast.  There is also the risk of needing a repeat surgery (I.e. re-excisional segmental mastectomy) for involved margin(s).  We discussed that breast  "conservation therapy involves two necessary components: the lumpectomy (or \"segmental mastectomy\"), and several weeks of whole breast radiation therapy.  We also discussed the significance of clear margins and that a subsequent procedure may be necessary to achieve this. Surgery is performed 4-6 weeks following completion of chemotherapy.     In addition to the surgical management of the breast, her axilla must be addressed.  She was found to be clinically node negative.  She is a candidate for sentinel lymph node biopsy. This is performed with the combination of the radioactive Tilmanocept and lymphazurin. The risks of a sentinel lymph node biopsy were discussed with the patient and family, including the risks of lymphedema (5-10%), bleeding, wound infection, wound dehiscence, seroma formation, and paresthesias. There is also a small risk of anaphylaxis with lymphazurin injection as part of the procedure. There is an approximately 10% false negative rate associated with sentinel lymph node biopsy as published in the literature.  The findings of the sentinel lymph node biopsy may result in the need for further surgery (i.e. Axillary lymph node dissection) versus radiation. There is a 1-2% chance of patients whose sentinel lymph nodes do not map despite dual tracer (radioactive Tilmanocept and lymphazurin). Should this be the case, we discussed that I would proceed with an axillary lymph node dissection at the index procedure if proceeding with mastectomy.      We discussed that surgical pathology results will be reviewed at the postoperative visit to allow for careful discussion of next steps and for answering questions.    She has HER2 amplified breast cancer; additional HER2-targeted therapy is planned in the adjuvant setting. The vascular access port will remain in place.    All of the above were discussed and all questions were answered.  Di Donaldson elected to proceed with LEFT RFID seed-localized " segmental mastectomy and LEFT axillary sentinel lymph node mapping and biopsy.      We discussed [facility] as a location for surgery; however, there are admission criteria. I do not see a HbA1c in her chart; we will obtain one today to determine whether she is a candidate for [facility].    Total time spent with the patient was 30 minutes, of which 75% was counseling.     PLAN:  1. HbA1c  2. LEFT RFID seed-localized segmental mastectomy with overlying skin en bloc  3. LEFT axillary sentinel lymph node mapping and biopsy   4. Patient to report to her PCP for preoperative H&P and testing.  5. Surgery location to be determined depending on HbA1c today  6. Surgery between 5/25 and 6/8 due to timing with systemic therapy    Ryann Srinivasan MD MSc FRCSC FACS  Assistant Professor of Surgery  Division of Surgical Oncology  [facility]     ADDENDUM:  HbA1c was 5.9 - will place surgery orders for [facility]. Patient notified via [employer].    Ryann Srinivasan MD MSc FRCSC FACS  Assistant Professor of Surgery  Division of Surgical Oncology  [facility]     40 minutes spent on the date of the encounter doing chart review, review of test results, interpretation of tests, patient visit, documentation, discussion with other provider(s) and discussion with family.        Again, thank you for allowing me to participate in the care of your patient.        Sincerely,        Ryann Srinivasan MD

## 2022-05-12 ENCOUNTER — TELEPHONE (OUTPATIENT)
Dept: GENERAL RADIOLOGY | Facility: CLINIC | Age: 77
End: 2022-05-12
Payer: COMMERCIAL

## 2022-05-12 ENCOUNTER — VIRTUAL VISIT (OUTPATIENT)
Dept: ONCOLOGY | Facility: CLINIC | Age: 77
End: 2022-05-12
Payer: COMMERCIAL

## 2022-05-12 DIAGNOSIS — Z17.1 MALIGNANT NEOPLASM OF CENTRAL PORTION OF LEFT BREAST IN FEMALE, ESTROGEN RECEPTOR NEGATIVE (H): Primary | ICD-10-CM

## 2022-05-12 DIAGNOSIS — C50.912 HER2-POSITIVE CARCINOMA OF LEFT BREAST (H): ICD-10-CM

## 2022-05-12 DIAGNOSIS — D61.810 ANTINEOPLASTIC CHEMOTHERAPY INDUCED PANCYTOPENIA (H): ICD-10-CM

## 2022-05-12 DIAGNOSIS — G62.0 CHEMOTHERAPY-INDUCED NEUROPATHY (H): ICD-10-CM

## 2022-05-12 DIAGNOSIS — Z17.31 HER2-POSITIVE CARCINOMA OF LEFT BREAST (H): ICD-10-CM

## 2022-05-12 DIAGNOSIS — T45.1X5A ANTINEOPLASTIC CHEMOTHERAPY INDUCED PANCYTOPENIA (H): ICD-10-CM

## 2022-05-12 DIAGNOSIS — C50.112 MALIGNANT NEOPLASM OF CENTRAL PORTION OF LEFT BREAST IN FEMALE, ESTROGEN RECEPTOR NEGATIVE (H): Primary | ICD-10-CM

## 2022-05-12 DIAGNOSIS — T45.1X5A CHEMOTHERAPY-INDUCED NEUROPATHY (H): ICD-10-CM

## 2022-05-12 PROCEDURE — 99214 OFFICE O/P EST MOD 30 MIN: CPT | Mod: 95 | Performed by: INTERNAL MEDICINE

## 2022-05-12 NOTE — TELEPHONE ENCOUNTER
Received call from Valentina in surgery scheduling.  She would like a phone call to schedulel this pt.  Pt's surgery with Dr. Srinivasan is scheduled for June 1.

## 2022-05-12 NOTE — TELEPHONE ENCOUNTER
Writer called and spoke with patient and reminded her that the surgery instructions are in the paperwork we reviewed in clinic yesterday. Patient verbalized understanding. Writer advised patient to contact us with any questions.    Annalisa Donaldson LPN

## 2022-05-12 NOTE — LETTER
5/12/2022         RE: Di Donaldson  [address] [place] [address]        Dear Colleague,    Thank you for referring your patient, Di Donaldson, to the [facility]. Please see a copy of my visit note below.    No notes on file    Again, thank you for allowing me to participate in the care of your patient.        Sincerely,        Osorio Meyer MD

## 2022-05-23 DIAGNOSIS — Z11.59 ENCOUNTER FOR SCREENING FOR OTHER VIRAL DISEASES: Primary | ICD-10-CM

## 2022-05-25 RX ORDER — PHENOL 1.4 %
10 AEROSOL, SPRAY (ML) MUCOUS MEMBRANE
COMMUNITY

## 2022-05-25 RX ORDER — ASPIRIN 325 MG
TABLET, DELAYED RELEASE (ENTERIC COATED) ORAL
COMMUNITY
Start: 2021-11-16

## 2022-05-25 RX ORDER — ASCORBIC ACID 1000 MG
200 TABLET ORAL
COMMUNITY

## 2022-05-26 ENCOUNTER — TELEPHONE (OUTPATIENT)
Dept: SURGERY | Facility: CLINIC | Age: 77
End: 2022-05-26
Payer: COMMERCIAL

## 2022-05-26 NOTE — TELEPHONE ENCOUNTER
Patient has been notified of scheduled appointment on 5/28/2022 at 1:10 pm in [place].  She has been given the address for the appointment. She should be able to make this work.

## 2022-05-26 NOTE — TELEPHONE ENCOUNTER
Called and spoke to patient. Patient lives an hour and a half away. Currently has pre procedure Covid test scheduled close to home on the 27th. However, this is a day early. With Monday being a holiday, is this okay or how should we proceed?    Meghan flanagan Procedure   Dermatology, General and Plastic Surgery, Urology Specialties   [facility] [place] [phone]

## 2022-05-26 NOTE — TELEPHONE ENCOUNTER
[facility] Call Center    Phone Message    May a detailed message be left on voicemail: yes     Reason for Call: The patient is having difficulty finding any place near her home to do a pre-procedure Covid Test in the time frame needed.  She is requesting a call to discuss.  Please advise.  Thank you.     Action Taken: Message routed to:  Adult Clinics: Surgery, General p [phone]    Travel Screening: Not Applicable

## 2022-05-28 ENCOUNTER — LAB (OUTPATIENT)
Dept: LAB | Facility: CLINIC | Age: 77
End: 2022-05-28
Payer: COMMERCIAL

## 2022-05-28 DIAGNOSIS — Z11.59 ENCOUNTER FOR SCREENING FOR OTHER VIRAL DISEASES: ICD-10-CM

## 2022-05-28 PROCEDURE — U0005 INFEC AGEN DETEC AMPLI PROBE: HCPCS

## 2022-05-28 PROCEDURE — U0003 INFECTIOUS AGENT DETECTION BY NUCLEIC ACID (DNA OR RNA); SEVERE ACUTE RESPIRATORY SYNDROME CORONAVIRUS 2 (SARS-COV-2) (CORONAVIRUS DISEASE [COVID-19]), AMPLIFIED PROBE TECHNIQUE, MAKING USE OF HIGH THROUGHPUT TECHNOLOGIES AS DESCRIBED BY CMS-2020-01-R: HCPCS

## 2022-05-29 LAB — SARS-COV-2 RNA RESP QL NAA+PROBE: NEGATIVE

## 2022-05-31 ENCOUNTER — ANESTHESIA EVENT (OUTPATIENT)
Dept: SURGERY | Facility: AMBULATORY SURGERY CENTER | Age: 77
End: 2022-05-31
Payer: COMMERCIAL

## 2022-05-31 ENCOUNTER — ANCILLARY PROCEDURE (OUTPATIENT)
Dept: ULTRASOUND IMAGING | Facility: CLINIC | Age: 77
End: 2022-05-31
Attending: SURGERY
Payer: COMMERCIAL

## 2022-05-31 DIAGNOSIS — Z17.1 MALIGNANT NEOPLASM OF CENTRAL PORTION OF LEFT BREAST IN FEMALE, ESTROGEN RECEPTOR NEGATIVE (H): ICD-10-CM

## 2022-05-31 DIAGNOSIS — C50.112 MALIGNANT NEOPLASM OF CENTRAL PORTION OF LEFT BREAST IN FEMALE, ESTROGEN RECEPTOR NEGATIVE (H): ICD-10-CM

## 2022-05-31 PROCEDURE — 19285 PERQ DEV BREAST 1ST US IMAG: CPT | Mod: LT

## 2022-06-01 ENCOUNTER — HOSPITAL ENCOUNTER (OUTPATIENT)
Facility: AMBULATORY SURGERY CENTER | Age: 77
Discharge: HOME | End: 2022-06-01
Attending: SURGERY | Admitting: SURGERY
Payer: COMMERCIAL

## 2022-06-01 ENCOUNTER — ANESTHESIA (OUTPATIENT)
Dept: SURGERY | Facility: AMBULATORY SURGERY CENTER | Age: 77
End: 2022-06-01
Payer: COMMERCIAL

## 2022-06-01 ENCOUNTER — ANCILLARY PROCEDURE (OUTPATIENT)
Dept: ULTRASOUND IMAGING | Facility: CLINIC | Age: 77
End: 2022-06-01
Attending: SURGERY
Payer: COMMERCIAL

## 2022-06-01 VITALS
OXYGEN SATURATION: 100 % | RESPIRATION RATE: 16 BRPM | TEMPERATURE: 98.6 F | HEART RATE: 70 BPM | DIASTOLIC BLOOD PRESSURE: 75 MMHG | SYSTOLIC BLOOD PRESSURE: 151 MMHG

## 2022-06-01 DIAGNOSIS — Z17.1 MALIGNANT NEOPLASM OF CENTRAL PORTION OF LEFT BREAST IN FEMALE, ESTROGEN RECEPTOR NEGATIVE (H): ICD-10-CM

## 2022-06-01 DIAGNOSIS — C50.112 MALIGNANT NEOPLASM OF CENTRAL PORTION OF LEFT BREAST IN FEMALE, ESTROGEN RECEPTOR NEGATIVE (H): ICD-10-CM

## 2022-06-01 LAB — GLUCOSE BLDC GLUCOMTR-MCNC: 100 MG/DL (ref 70–99)

## 2022-06-01 PROCEDURE — 19301 PARTIAL MASTECTOMY: CPT | Mod: LT | Performed by: SURGERY

## 2022-06-01 PROCEDURE — G8916 PT W IV AB GIVEN ON TIME: HCPCS

## 2022-06-01 PROCEDURE — 38525 BIOPSY/REMOVAL LYMPH NODES: CPT | Mod: LT | Performed by: SURGERY

## 2022-06-01 PROCEDURE — 19301 PARTIAL MASTECTOMY: CPT | Mod: LT

## 2022-06-01 PROCEDURE — 38900 IO MAP OF SENT LYMPH NODE: CPT | Mod: LT | Performed by: SURGERY

## 2022-06-01 PROCEDURE — 38525 BIOPSY/REMOVAL LYMPH NODES: CPT | Mod: LT

## 2022-06-01 PROCEDURE — G8907 PT DOC NO EVENTS ON DISCHARG: HCPCS

## 2022-06-01 RX ORDER — PROPOFOL 10 MG/ML
INJECTION, EMULSION INTRAVENOUS PRN
Status: DISCONTINUED | OUTPATIENT
Start: 2022-06-01 | End: 2022-06-01

## 2022-06-01 RX ORDER — ACETAMINOPHEN 325 MG/1
975 TABLET ORAL ONCE
Status: DISCONTINUED | OUTPATIENT
Start: 2022-06-01 | End: 2022-06-02 | Stop reason: HOSPADM

## 2022-06-01 RX ORDER — ONDANSETRON 2 MG/ML
INJECTION INTRAMUSCULAR; INTRAVENOUS PRN
Status: DISCONTINUED | OUTPATIENT
Start: 2022-06-01 | End: 2022-06-01

## 2022-06-01 RX ORDER — LABETALOL HYDROCHLORIDE 5 MG/ML
10 INJECTION, SOLUTION INTRAVENOUS
Status: DISCONTINUED | OUTPATIENT
Start: 2022-06-01 | End: 2022-06-02 | Stop reason: HOSPADM

## 2022-06-01 RX ORDER — FENTANYL CITRATE 50 UG/ML
25 INJECTION, SOLUTION INTRAMUSCULAR; INTRAVENOUS
Status: DISCONTINUED | OUTPATIENT
Start: 2022-06-01 | End: 2022-06-02 | Stop reason: HOSPADM

## 2022-06-01 RX ORDER — FENTANYL CITRATE 50 UG/ML
25 INJECTION, SOLUTION INTRAMUSCULAR; INTRAVENOUS EVERY 5 MIN PRN
Status: DISCONTINUED | OUTPATIENT
Start: 2022-06-01 | End: 2022-06-02 | Stop reason: HOSPADM

## 2022-06-01 RX ORDER — PROPOFOL 10 MG/ML
INJECTION, EMULSION INTRAVENOUS CONTINUOUS PRN
Status: DISCONTINUED | OUTPATIENT
Start: 2022-06-01 | End: 2022-06-01

## 2022-06-01 RX ORDER — NALOXONE HYDROCHLORIDE 0.4 MG/ML
0.4 INJECTION, SOLUTION INTRAMUSCULAR; INTRAVENOUS; SUBCUTANEOUS
Status: DISCONTINUED | OUTPATIENT
Start: 2022-06-01 | End: 2022-06-02 | Stop reason: HOSPADM

## 2022-06-01 RX ORDER — HYDRALAZINE HYDROCHLORIDE 20 MG/ML
2.5-5 INJECTION INTRAMUSCULAR; INTRAVENOUS EVERY 10 MIN PRN
Status: DISCONTINUED | OUTPATIENT
Start: 2022-06-01 | End: 2022-06-02 | Stop reason: HOSPADM

## 2022-06-01 RX ORDER — ISOSULFAN BLUE 50 MG/5ML
INJECTION, SOLUTION SUBCUTANEOUS PRN
Status: DISCONTINUED | OUTPATIENT
Start: 2022-06-01 | End: 2022-06-01 | Stop reason: HOSPADM

## 2022-06-01 RX ORDER — KETOROLAC TROMETHAMINE 30 MG/ML
15 INJECTION, SOLUTION INTRAMUSCULAR; INTRAVENOUS EVERY 6 HOURS PRN
Status: DISCONTINUED | OUTPATIENT
Start: 2022-06-01 | End: 2022-06-02 | Stop reason: HOSPADM

## 2022-06-01 RX ORDER — NALOXONE HYDROCHLORIDE 0.4 MG/ML
0.2 INJECTION, SOLUTION INTRAMUSCULAR; INTRAVENOUS; SUBCUTANEOUS
Status: DISCONTINUED | OUTPATIENT
Start: 2022-06-01 | End: 2022-06-02 | Stop reason: HOSPADM

## 2022-06-01 RX ORDER — CEFAZOLIN SODIUM 2 G/100ML
2 INJECTION, SOLUTION INTRAVENOUS SEE ADMIN INSTRUCTIONS
Status: DISCONTINUED | OUTPATIENT
Start: 2022-06-01 | End: 2022-06-02 | Stop reason: HOSPADM

## 2022-06-01 RX ORDER — ALBUTEROL SULFATE 0.83 MG/ML
2.5 SOLUTION RESPIRATORY (INHALATION) EVERY 4 HOURS PRN
Status: DISCONTINUED | OUTPATIENT
Start: 2022-06-01 | End: 2022-06-02 | Stop reason: HOSPADM

## 2022-06-01 RX ORDER — LIDOCAINE HYDROCHLORIDE 20 MG/ML
INJECTION, SOLUTION INFILTRATION; PERINEURAL PRN
Status: DISCONTINUED | OUTPATIENT
Start: 2022-06-01 | End: 2022-06-01

## 2022-06-01 RX ORDER — FENTANYL CITRATE 50 UG/ML
25-50 INJECTION, SOLUTION INTRAMUSCULAR; INTRAVENOUS
Status: DISCONTINUED | OUTPATIENT
Start: 2022-06-01 | End: 2022-06-02 | Stop reason: HOSPADM

## 2022-06-01 RX ORDER — SODIUM CHLORIDE, SODIUM LACTATE, POTASSIUM CHLORIDE, CALCIUM CHLORIDE 600; 310; 30; 20 MG/100ML; MG/100ML; MG/100ML; MG/100ML
INJECTION, SOLUTION INTRAVENOUS CONTINUOUS
Status: DISCONTINUED | OUTPATIENT
Start: 2022-06-01 | End: 2022-06-02 | Stop reason: HOSPADM

## 2022-06-01 RX ORDER — LIDOCAINE 40 MG/G
CREAM TOPICAL
Status: DISCONTINUED | OUTPATIENT
Start: 2022-06-01 | End: 2022-06-02 | Stop reason: HOSPADM

## 2022-06-01 RX ORDER — BUPIVACAINE HYDROCHLORIDE 2.5 MG/ML
INJECTION, SOLUTION EPIDURAL; INFILTRATION; INTRACAUDAL
Status: DISCONTINUED | OUTPATIENT
Start: 2022-06-01 | End: 2022-06-01

## 2022-06-01 RX ORDER — MEPERIDINE HYDROCHLORIDE 25 MG/ML
12.5 INJECTION INTRAMUSCULAR; INTRAVENOUS; SUBCUTANEOUS
Status: DISCONTINUED | OUTPATIENT
Start: 2022-06-01 | End: 2022-06-02 | Stop reason: HOSPADM

## 2022-06-01 RX ORDER — LORAZEPAM 2 MG/ML
.5-1 INJECTION INTRAMUSCULAR
Status: DISCONTINUED | OUTPATIENT
Start: 2022-06-01 | End: 2022-06-02 | Stop reason: HOSPADM

## 2022-06-01 RX ORDER — FLUMAZENIL 0.1 MG/ML
0.2 INJECTION, SOLUTION INTRAVENOUS
Status: DISCONTINUED | OUTPATIENT
Start: 2022-06-01 | End: 2022-06-02 | Stop reason: HOSPADM

## 2022-06-01 RX ORDER — OXYCODONE HYDROCHLORIDE 5 MG/1
5 TABLET ORAL EVERY 4 HOURS PRN
Status: DISCONTINUED | OUTPATIENT
Start: 2022-06-01 | End: 2022-06-02 | Stop reason: HOSPADM

## 2022-06-01 RX ORDER — ONDANSETRON 2 MG/ML
4 INJECTION INTRAMUSCULAR; INTRAVENOUS EVERY 30 MIN PRN
Status: DISCONTINUED | OUTPATIENT
Start: 2022-06-01 | End: 2022-06-02 | Stop reason: HOSPADM

## 2022-06-01 RX ORDER — ACETAMINOPHEN 325 MG/1
975 TABLET ORAL ONCE
Status: COMPLETED | OUTPATIENT
Start: 2022-06-01 | End: 2022-06-01

## 2022-06-01 RX ORDER — CEFAZOLIN SODIUM 2 G/100ML
2 INJECTION, SOLUTION INTRAVENOUS
Status: COMPLETED | OUTPATIENT
Start: 2022-06-01 | End: 2022-06-01

## 2022-06-01 RX ORDER — ONDANSETRON 4 MG/1
4 TABLET, ORALLY DISINTEGRATING ORAL EVERY 30 MIN PRN
Status: DISCONTINUED | OUTPATIENT
Start: 2022-06-01 | End: 2022-06-02 | Stop reason: HOSPADM

## 2022-06-01 RX ADMIN — ONDANSETRON 4 MG: 2 INJECTION INTRAMUSCULAR; INTRAVENOUS at 10:08

## 2022-06-01 RX ADMIN — FENTANYL CITRATE 50 MCG: 50 INJECTION, SOLUTION INTRAMUSCULAR; INTRAVENOUS at 08:28

## 2022-06-01 RX ADMIN — BUPIVACAINE HYDROCHLORIDE 20 ML: 2.5 INJECTION, SOLUTION EPIDURAL; INFILTRATION; INTRACAUDAL at 08:27

## 2022-06-01 RX ADMIN — CEFAZOLIN SODIUM 2 G: 2 INJECTION, SOLUTION INTRAVENOUS at 08:45

## 2022-06-01 RX ADMIN — ACETAMINOPHEN 975 MG: 325 TABLET ORAL at 07:16

## 2022-06-01 RX ADMIN — PROPOFOL 150 MCG/KG/MIN: 10 INJECTION, EMULSION INTRAVENOUS at 08:51

## 2022-06-01 RX ADMIN — LIDOCAINE HYDROCHLORIDE 60 MG: 20 INJECTION, SOLUTION INFILTRATION; PERINEURAL at 08:51

## 2022-06-01 RX ADMIN — SODIUM CHLORIDE, SODIUM LACTATE, POTASSIUM CHLORIDE, CALCIUM CHLORIDE: 600; 310; 30; 20 INJECTION, SOLUTION INTRAVENOUS at 07:18

## 2022-06-01 RX ADMIN — PROPOFOL 170 MG: 10 INJECTION, EMULSION INTRAVENOUS at 08:51

## 2022-06-01 NOTE — ANESTHESIA PROCEDURE NOTES
Pectoralis Procedure Note    Pre-Procedure   Staff -        Anesthesiologist:  Roni Lopez MD       Performed By: anesthesiologist       Location: pre-op       Procedure Start/Stop Times: 6/1/2022 8:27 AM and 6/1/2022 8:33 AM       Pre-Anesthestic Checklist: patient identified, IV checked, site marked, risks and benefits discussed, informed consent, monitors and equipment checked, pre-op evaluation, at physician/surgeon's request and post-op pain management  Timeout:       Correct Patient: Yes        Correct Procedure: Yes        Correct Site: Yes        Correct Position: Yes        Correct Laterality: Yes        Site Marked: Yes  Procedure Documentation  Procedure: Pectoralis       Laterality: left       Patient Position: sitting       Patient Prep/Sterile Barriers: sterile gloves, mask       Skin prep: Chloraprep       Needle Type: short bevel       Needle Gauge: 21.        Needle Length (millimeters): 100        Ultrasound guided       1. Ultrasound was used to identify targeted nerve, plexus, vascular marker, or fascial plane and place a needle adjacent to it in real-time.       2. Ultrasound was used to visualize the spread of anesthetic in close proximity to the above referenced structure.       3. A permanent image is entered into the patient's record.       4. The visualized anatomic structures appeared normal.       5. There were no apparent abnormal pathologic findings.    Assessment/Narrative         The placement was negative for: blood aspirated, painful injection and site bleeding       Paresthesias: No.       Bolus given via needle..        Secured via.        Insertion/Infusion Method: Single Shot       Complications: none       Injection made incrementally with aspirations every 5 mL.    Medication(s) Administered   Bupivacaine 0.25% PF (Infiltration) - Infiltration   20 mL - 6/1/2022 8:27:00 AM  Bupivacaine liposome (Exparel) 1.3% LA inj susp (Infiltration) - Infiltration   20 mL -  6/1/2022 8:27:00 AM  Medication Administration Time: 6/1/2022 8:27 AM     Comments:  Pectoralis 1 & 2 blocks performed

## 2022-06-01 NOTE — ANESTHESIA CARE TRANSFER NOTE
"  Patient: Di Donaldson    Procedure: Procedure(s):  LEFT Radiofrequency Identification Seed-Localized Segmental Mastectomy (=\"Lumpectomy\"), LEFT Axillary [place] Lymph Node Biopsy       Diagnosis: Malignant neoplasm of central portion of left breast in female, estrogen receptor negative (H) [C50.112, Z17.1]  Diagnosis Additional Information: No value filed.    Anesthesia Type:   No value filed.     Note:    Oropharynx: oropharynx clear of all foreign objects  Level of Consciousness: drowsy  Oxygen Supplementation: face mask  Level of Supplemental Oxygen (L/min / FiO2): 6    Dentition: dentition unchanged  Vital Signs Stable: post-procedure vital signs reviewed and stable  Report to RN Given: handoff report given  Patient transferred to: PACU  Comments: To PACU after LMA D/Cd.  Dentition intact/atraumatic.  Report to RN VSS Respiratory status stable.  Handoff Report: Identifed the Patient, Identified the Reponsible Provider, Reviewed the pertinent medical history, Discussed the surgical course, Reviewed Intra-OP anesthesia mangement and issues during anesthesia, Set expectations for post-procedure period and Allowed opportunity for questions and acknowledgement of understanding      Vitals:  Vitals Value Taken Time   /81 06/01/22 1020   Temp     Pulse 70 06/01/22 1021   Resp 14 06/01/22 1021   SpO2 100 % 06/01/22 1021   Vitals shown include unvalidated device data.    Electronically Signed By: CLARENCE Estrada CRNA  June 1, 2022  10:22 AM  "

## 2022-06-01 NOTE — DISCHARGE INSTRUCTIONS
"Information about liposomal bupivacaine (Exparel)    What is Liposomal Bupivacaine?    Liposomal Bupivacaine is a numbing medication that can help you manage your pain after surgery.  This medication is similar to \"novacaine,\" which is often used by the dentist.  Liposomal bupivacaine is released slowly and can help control pain for up to 72 hours.    What is the purpose of Liposomal Bupivacaine?  To manage your pain after surgery  To help you sleep better, take deep breaths, walk more comfortable, and feel up to visiting with others    How is the procedure done?  Liposomal bupivacaine is a medication given by an injection.  It is usually given right before your surgery.  If this is the case, you will be awake or sedated, but you should experience minimal pain during the procedure.  For some people, the injection may be given at the very end of your surgery.  It all depends on the type of surgery and your situation.  The procedure usually takes about 5-15 minutes.  An ultrasound machine will help the anesthesiologist insert it in the right place or the surgeon will inject it under direct vision.   A needle is used to place the numbing medication under your skin.  It provides pain relief by numbing the tissue in the area where your surgeon will make the incision.    What can I expect?  You may experience numbness, tingling, or a feeling of heaviness around the area that was injected.  If you experience any of the follow symptoms IMMEDIATELY CALL THE REGIONAL ANESTHESIA PAIN SERVICE:  Numbness or tingling occurs in areas other than around the injection site  Blurry vision  Ringing in your ears  A metallic taste in your mouth    PAGE: Dial [phone].  When prompted, enter the following 4-digit ID number:  [id number].  You will be prompted to enter your phone number; and then enter the # sign.  The clinician on call will call you back.    OR  CALL: Dial [phone].  Let the hospital  know that you are having a " problem with a nerve block and that you would like to speak to the regional anesthesia pain service right away.    You should not receive any other type of numbing medication within 4 days after receiving liposomal bupivacaine unless your anesthesiologist approves.    Post Operative Instructions: Regional Anesthetic with Liposomal Bupivacaine for Chest and Abdominal Surgery  General Information:   Regional anesthesia is when local anesthetic or  numbing  medication is injected around the nerves to anesthetize or  numb  the area supplied by that set of nerves.     Types of Regional Blocks:  Transversus Abdominis Plane (TAP): A block injected beneath the covering of a muscle layer of the abdomen for abdominal surgery  Pectoral: A block injected near the breast for surgery on the breast and armpit  Paravertebral: A block injected in the back for surgery on the chest, ribs, and breast    Procedure:  The type of anesthesia your doctor used to numb your chest or abdomen will usually not wear off for 24-48 hours, but may last as long as 72 hours.     Diet:  There are no restrictions on your diet. You should drink plenty of fluids.     Discomfort:  You will have a tingling and prickly sensation in your chest or abdomen as the feeling begins to return. You can also expect some discomfort. The amount of discomfort is unpredictable, but if you have more pain than can be controlled with pain medication you should notify your physician.     Pain Medicine:   Begin taking your oral pain pills before bedtime and during the night to avoid a sudden onset of pain as part of the block wears off.  Do not engage in drinking, driving, or hazardous occupations while taking pain medication.     Stitches:   You may have stitches or special skin closures. You doctor will inform you when to return to the office to have them removed.        [facility]  Same-Day Surgery   Adult Discharge Orders & Instructions   For 24  "hours after surgery  Get plenty of rest.  A responsible adult must stay with you for at least 24 hours after you leave the hospital.   Do not drive or use heavy equipment.  If you have weakness or tingling, don't drive or use heavy equipment until this feeling goes away.  Do not drink alcohol.  Avoid strenuous or risky activities.  Ask for help when climbing stairs.   You may feel lightheaded.  IF so, sit for a few minutes before standing.  Have someone help you get up.   If you have nausea (feel sick to your stomach): Drink only clear liquids such as apple juice, ginger ale, broth or 7-Up.  Rest may also help.  Be sure to drink enough fluids.  Move to a regular diet as you feel able.  You may have a slight fever. Call the doctor if your fever is over 100 F (37.7 C) (taken under the tongue) or lasts longer than 24 hours.  You may have a dry mouth, a sore throat, muscle aches or trouble sleeping.  These should go away after 24 hours.  Do not make important or legal decisions.   Call your doctor for any of the followin.  Signs of infection (fever, growing tenderness at the surgery site, a large amount of drainage or bleeding, severe pain, foul-smelling drainage, redness, swelling).    2. It has been over 8 to 10 hours since surgery and you are still not able to urinate (pass water).    3.  Headache for over 24 hours.    4.  Numbness, tingling or weakness the day after surgery (if you had spinal anesthesia).  To contact a doctor, call To contact Dr Srinivasan:  please call the clinic (Monday through Friday 8:00am-5:00pm [phone] Hannah ALBERT) or on-call surgical oncology resident (nights and weekends [phone] and ask \"I would like to page the Surgical Oncology Resident on call.\")          No tylenol until 1:30pm  "

## 2022-06-01 NOTE — ANESTHESIA PROCEDURE NOTES
Airway         Procedure Start/Stop Times: 6/1/2022 8:52 AM  Staff -        Performed By: CRNAIndications and Patient Condition       Indications for airway management: bay-procedural       Induction type:intravenous       Mask difficulty assessment: 0 - not attempted    Final Airway Details       Final airway type: supraglottic airway    Supraglottic Airway Details        Type: LMA       LMA size: 4    Post intubation assessment        Placement verified by: capnometry and chest rise        Number of attempts at approach: 1       Secured with: plastic tape       Ease of procedure: easy       Dentition: Intact and Unchanged    Medication(s) Administered   Medication Administration Time: 6/1/2022 8:52 AM

## 2022-06-01 NOTE — ANESTHESIA PREPROCEDURE EVALUATION
"Anesthesia Pre-Procedure Evaluation    Patient: Di Donaldson   MRN: [id number] : 1945        Procedure : Procedure(s):  LEFT Radiofrequency Identification Seed-Localized Segmental Mastectomy (=\"Lumpectomy\"), LEFT Axillary [place] Lymph Node Biopsy          Past Medical History:   Diagnosis Date     hypertension      Hypothyroidism      Type 2 diabetes mellitus (H)       Past Surgical History:   Procedure Laterality Date     BACK SURGERY      cervical fusion     GYN SURGERY      partial hysterectomy     INSERT PORT VASCULAR ACCESS Right 2022    Procedure: ultrasound guided right internal jugular venous access port placement with flouroscopy;  Surgeon: Rolando Reese DO;  Location: [facility] OR     TOTAL KNEE ARTHROPLASTY Left 2021      Allergies   Allergen Reactions     Hmg-Coa-R Inhibitors Unknown     Clindamycin Other (See Comments)     mild      Social History     Tobacco Use     Smoking status: Never Smoker     Smokeless tobacco: Never Used   Substance Use Topics     Alcohol use: Yes      Wt Readings from Last 1 Encounters:   22 95.3 kg (210 lb 3.2 oz)        Anesthesia Evaluation            ROS/MED HX  ENT/Pulmonary:       Neurologic:       Cardiovascular:     (+) hypertension-----    METS/Exercise Tolerance:     Hematologic:       Musculoskeletal:       GI/Hepatic:       Renal/Genitourinary:       Endo:     (+) type II DM, thyroid problem, hypothyroidism,     Psychiatric/Substance Use:       Infectious Disease:       Malignancy:   (+) Malignancy, History of Breast.Breast CA Active status post Chemo.        Other:            Physical Exam    Airway  airway exam normal      Mallampati: II   TM distance: > 3 FB   Neck ROM: full   Mouth opening: > 3 cm    Respiratory Devices and Support         Dental  no notable dental history         Cardiovascular          Rhythm and rate: regular and normal     Pulmonary   pulmonary exam normal        breath sounds clear to auscultation "           OUTSIDE LABS:  CBC:   Lab Results   Component Value Date    WBC 7.1 04/26/2022    WBC 6.1 04/19/2022    HGB 10.0 (L) 04/26/2022    HGB 9.2 (L) 04/19/2022    HCT 30.2 (L) 04/26/2022    HCT 27.6 (L) 04/19/2022     04/26/2022     04/19/2022     BMP:   Lab Results   Component Value Date     (H) 06/01/2022     COAGS: No results found for: PTT, INR, FIBR  POC: No results found for: BGM, HCG, HCGS  HEPATIC:   Lab Results   Component Value Date    ALBUMIN 3.0 (L) 03/29/2022    PROTTOTAL 6.1 (L) 03/29/2022    ALT 20 03/29/2022    AST 13 03/29/2022    ALKPHOS 61 03/29/2022    BILITOTAL 0.3 03/29/2022     OTHER:   Lab Results   Component Value Date    A1C 5.9 (H) 05/11/2022       Anesthesia Plan    ASA Status:  3   NPO Status:  NPO Appropriate    Anesthesia Type: General.     - Airway: LMA   Induction: Intravenous.   Maintenance: Balanced.        Consents    Anesthesia Plan(s) and associated risks, benefits, and realistic alternatives discussed. Questions answered and patient/representative(s) expressed understanding.    - Discussed:     - Discussed with:  Patient      - Extended Intubation/Ventilatory Support Discussed: No.      - Patient is DNR/DNI Status: No    Use of blood products discussed: No .     Postoperative Care    Pain management: IV analgesics, Oral pain medications, Multi-modal analgesia, Peripheral nerve block (Single Shot).   PONV prophylaxis: Background Propofol Infusion, Ondansetron (or other 5HT-3)     Comments:                Roni Lopez MD

## 2022-06-01 NOTE — ANESTHESIA POSTPROCEDURE EVALUATION
"Patient: Di Donaldson    Procedure: Procedure(s):  LEFT Radiofrequency Identification Seed-Localized Segmental Mastectomy (=\"Lumpectomy\"), LEFT Axillary [place] Lymph Node Biopsy       Anesthesia Type:  No value filed.    Note:  Disposition: Outpatient   Postop Pain Control: Uneventful            Sign Out: Well controlled pain   PONV: No   Neuro/Psych: Uneventful            Sign Out: Acceptable/Baseline neuro status   Airway/Respiratory: Uneventful            Sign Out: Acceptable/Baseline resp. status   CV/Hemodynamics: Uneventful            Sign Out: Acceptable CV status   Other NRE: NONE   DID A NON-ROUTINE EVENT OCCUR? No           Last vitals:  Vitals Value Taken Time   /67 06/01/22 1045   Temp 37.1  C (98.7  F) 06/01/22 1020   Pulse 73 06/01/22 1031   Resp 12 06/01/22 1045   SpO2 100 % 06/01/22 1045   Vitals shown include unvalidated device data.    Electronically Signed By: Roni Lopez MD  June 1, 2022  3:55 PM  "

## 2022-06-08 PROCEDURE — 88307 TISSUE EXAM BY PATHOLOGIST: CPT | Mod: GC | Performed by: PATHOLOGY

## 2022-06-10 LAB
PATH REPORT.ADDENDUM SPEC: NORMAL
PATH REPORT.COMMENTS IMP SPEC: NORMAL
PATH REPORT.COMMENTS IMP SPEC: NORMAL
PATH REPORT.FINAL DX SPEC: NORMAL
PATH REPORT.GROSS SPEC: NORMAL
PATH REPORT.MICROSCOPIC SPEC OTHER STN: NORMAL
PATH REPORT.RELEVANT HX SPEC: NORMAL
PATHOLOGY SYNOPTIC REPORT: NORMAL
PHOTO IMAGE: NORMAL

## 2022-06-15 ENCOUNTER — OFFICE VISIT (OUTPATIENT)
Dept: SURGERY | Facility: CLINIC | Age: 77
End: 2022-06-15
Payer: COMMERCIAL

## 2022-06-15 ENCOUNTER — TELEPHONE (OUTPATIENT)
Dept: LAB | Facility: CLINIC | Age: 77
End: 2022-06-15

## 2022-06-15 ENCOUNTER — ONCOLOGY VISIT (OUTPATIENT)
Dept: ONCOLOGY | Facility: CLINIC | Age: 77
End: 2022-06-15
Payer: COMMERCIAL

## 2022-06-15 VITALS
HEART RATE: 79 BPM | HEIGHT: 67 IN | TEMPERATURE: 99 F | OXYGEN SATURATION: 98 % | DIASTOLIC BLOOD PRESSURE: 67 MMHG | WEIGHT: 208 LBS | BODY MASS INDEX: 32.65 KG/M2 | SYSTOLIC BLOOD PRESSURE: 122 MMHG

## 2022-06-15 DIAGNOSIS — G62.0 CHEMOTHERAPY-INDUCED NEUROPATHY (H): ICD-10-CM

## 2022-06-15 DIAGNOSIS — C50.112 MALIGNANT NEOPLASM OF CENTRAL PORTION OF LEFT BREAST IN FEMALE, ESTROGEN RECEPTOR NEGATIVE (H): ICD-10-CM

## 2022-06-15 DIAGNOSIS — Z17.1 MALIGNANT NEOPLASM OF CENTRAL PORTION OF LEFT BREAST IN FEMALE, ESTROGEN RECEPTOR NEGATIVE (H): Primary | ICD-10-CM

## 2022-06-15 DIAGNOSIS — C50.412 MALIGNANT NEOPLASM OF UPPER-OUTER QUADRANT OF LEFT BREAST IN FEMALE, ESTROGEN RECEPTOR NEGATIVE (H): Primary | ICD-10-CM

## 2022-06-15 DIAGNOSIS — Z17.1 MALIGNANT NEOPLASM OF CENTRAL PORTION OF LEFT BREAST IN FEMALE, ESTROGEN RECEPTOR NEGATIVE (H): ICD-10-CM

## 2022-06-15 DIAGNOSIS — T45.1X5A CHEMOTHERAPY INDUCED CARDIOMYOPATHY (H): ICD-10-CM

## 2022-06-15 DIAGNOSIS — Z80.3 FAMILY HISTORY OF MALIGNANT NEOPLASM OF BREAST: ICD-10-CM

## 2022-06-15 DIAGNOSIS — I42.7 CHEMOTHERAPY INDUCED CARDIOMYOPATHY (H): ICD-10-CM

## 2022-06-15 DIAGNOSIS — Z17.1 MALIGNANT NEOPLASM OF OVERLAPPING SITES OF LEFT BREAST IN FEMALE, ESTROGEN RECEPTOR NEGATIVE (H): ICD-10-CM

## 2022-06-15 DIAGNOSIS — Z17.1 MALIGNANT NEOPLASM OF UPPER-OUTER QUADRANT OF LEFT BREAST IN FEMALE, ESTROGEN RECEPTOR NEGATIVE (H): Primary | ICD-10-CM

## 2022-06-15 DIAGNOSIS — T45.1X5A CHEMOTHERAPY-INDUCED NEUROPATHY (H): ICD-10-CM

## 2022-06-15 DIAGNOSIS — C50.112 MALIGNANT NEOPLASM OF CENTRAL PORTION OF LEFT BREAST IN FEMALE, ESTROGEN RECEPTOR NEGATIVE (H): Primary | ICD-10-CM

## 2022-06-15 DIAGNOSIS — Z80.0 FAMILY HISTORY OF COLON CANCER: ICD-10-CM

## 2022-06-15 DIAGNOSIS — Z17.31 HER2-POSITIVE CARCINOMA OF LEFT BREAST (H): ICD-10-CM

## 2022-06-15 DIAGNOSIS — C50.912 HER2-POSITIVE CARCINOMA OF LEFT BREAST (H): ICD-10-CM

## 2022-06-15 DIAGNOSIS — C50.812 MALIGNANT NEOPLASM OF OVERLAPPING SITES OF LEFT BREAST IN FEMALE, ESTROGEN RECEPTOR NEGATIVE (H): ICD-10-CM

## 2022-06-15 PROCEDURE — 99214 OFFICE O/P EST MOD 30 MIN: CPT | Performed by: INTERNAL MEDICINE

## 2022-06-15 PROCEDURE — 99024 POSTOP FOLLOW-UP VISIT: CPT | Performed by: SURGERY

## 2022-06-15 ASSESSMENT — PAIN SCALES - GENERAL: PAINLEVEL: NO PAIN (0)

## 2022-06-15 NOTE — LETTER
6/15/2022         RE: Di Donaldson  [address] [place] [address]        Dear Colleague,    Thank you for referring your patient, Di Donaldson, to the [facility]. Please see a copy of my visit note below.    FOLLOW-UP  Ed 15, 2022    Di Donaldson is a 76 year old female who returns for her 1st post-operative follow-up visit.    Cancer Staging  Malignant neoplasm of upper-outer quadrant of left breast in female, estrogen receptor negative (H)  Staging form: Breast, AJCC 8th Edition  - Clinical: Stage IA (cT1c, cN0, cM0, G2, ER-, DC-, HER2+) - Signed by Ryann Sriniavsan MD on 2/1/2022      Treatment to date:  1. Neoadjuvant taxol, Herceptin (2/8/2022 to 4/26/2022)  2. Left RFID seed-localized segmental mastectomy and left axillary sentinel lymph node mapping and biopsy (6/1/2022)    HPI:    She underwent a left RFID seed-localized segmental mastectomy and SLNB on 6/1/2022.  She is currently 2 week(s) post-op.  Final surgical pathology showed [id number] with uninvolved margins, and 1/2 lymph nodes with treatment-related changes (the other node was benign).    Since the procedure, she has been doing well. She denies any redness or swelling, or drainage from the incision, or fever/chills.  She has good range of motion and denies any upper extremity swelling.      Physical Exam  Constitutional:       Appearance: She is well-developed.   Pulmonary:      Effort: No respiratory distress.   Chest:      Comments: Breast incision healing well.  No seroma. No cellulitis or abscess. No breast contour abnormality or nipple-areolar complex distortion.    Lymphadenopathy:      Comments: Axillary incision healing well without hematoma or cellulitis.  There is a 2.5 cm seroma.   Skin:     General: Skin is warm and dry.         INVESTIGATIONS:    Surgical Pathology (6/1/2022):  Final Diagnosis   A. Lymph node, LEFT axillary, sentinel #1, excision:  -One benign lymph node (0/1)  -No  lymph node metastasis. Fibrosis and histiocytic aggregates suggesting possible treatment related changes     B. Lymph node, LEFT axillary, sentinel #2, excision:  -One benign lymph node (0/1)     C. LEFT breast, central, seed localized segmental mastectomy:  -Microscopic focus of INVASIVE BREAST CARCINOMA OF NO SPECIAL TYPE (INVASIVE DUCTAL CARCINOMA), size 0.2 mm, residual after neoadjuvant chemotherapy  -No lymphovascular invasion identified  -Margins are uninvolved by invasive carcinoma  -Invasive carcinoma is > 5 mm from all surgical margins  -Treatment related changes  -Other findings: columnar cell change and fat necrosis  -Prior core biopsy site changes and marker identified  -Invasive carcinoma is estrogen receptor negative and progesterone receptor negative by immunohistochemistry and is HER2 amplified by FISH (HER2:CEN17 ratio >6.9) (performed on prior core biopsy, see report [id number])     ASSESSMENT:    Di Donaldson is a 76 year old female with LEFT breast cancer, s/p neoadjuvant systemic therapy and surgery.    She is healing well.  I recommended she start moisturizing and massaging the scar with Vaseline. She should continue to wear the supportive bra.  The axillary seroma will resorb over time. No intervention is indicated at this time.    We reviewed the pathology today and a copy of the report was provided. No further surgery is indicated.      We reviewed that she will also need adjuvant radiation therapy to complete breast conservation therapy; a referral to Radiation Oncology will be made. This is in light of the HER2 amplified subtype and treatment related changes seen in the axillary node.      She will follow up with Dr Meyer as well.  I have not made specific follow-up appointments with me at this time and will defer ongoing care to Dr Meyer.  She knows to call if she has concerns.     All of the above was discussed with the patient and her son Kareem by phone and all questions  were answered.     PLAN:  1. Radiation oncology referral  2. Follow up with Dr Meyer  3. Follow up with me NEVILLE Srinivasan MD MSc [facility] FACS  Assistant Professor of Surgery  Division of Surgical Oncology  [facility]       Again, thank you for allowing me to participate in the care of your patient.        Sincerely,        Ryann Srinivasan MD

## 2022-06-15 NOTE — Clinical Note
6/15/2022         RE: Di Donaldson  [address] [place] [address]        Dear Colleague,    Thank you for referring your patient, Di Donaldson, to the [facility]. Please see a copy of my visit note below.    [facility] Hematology / Oncology  Progress Note  Name: Di Donaldson  :  1945    MRN:  [id number]    --------------------    Assessment / Plan:  Early-stage, left-sided, hormone negative, HER-2 positive breast cancer:    Di, her son Fredo and I reviewed her pathology report in detail.  She had an excellent response to neoadjuvant therapy with only a microscopic amount of residual HER2 positive breast cancer leftover at the time of surgery and most importantly negative lymph nodes.  From an adjuvant standpoint, I would recommend adjuvant radiotherapy which logistically would be closest to [place] for her.  Given travel time and travel distance, we will be hopeful that some course of hypofractionated breast radiotherapy could be considered potentially over 5 sessions, but ultimately this is up to the discretion of her treating radiation oncologist.  From an adjuvant systemic therapy standpoint, we agreed to Herceptin for additional of 9 months delivered intravenously every 3 weeks; we're following the Tol[place] protocol for early stage, HER2 positive breast cancer.  We will need to do some periodic cardiac monitoring.  One could consider switching to an alternative agent such as Kadcyla given the microscopic disease at the time of surgery, but given her pretty bad grade 2 to grade 3 neuropathy right now I have concerns that adjuvant Kadcyla could aggravate this even further.  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  She is in agreement with this plan.    Osorio Meyer MD    --------------------    Interval  "History:  Di returns for follow up of breast cancer.  All in all, she is doing quite well.  She is healing up from surgery well and without complications.  No breast concerns.  No lymphedema concerns.  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  Is affecting her feet with some slight balance problems.  She is definitely clumsy or using her hands.    --------------------    Physical Exam:  VS: /67 (BP Location: Left arm, Patient Position: Sitting)   Pulse 79   Temp 99  F (37.2  C) (Oral)   Ht 1.702 m (5' 7\")   Wt 94.3 kg (208 lb)   SpO2 98%   BMI 32.58 kg/m    Gen: Well-appearing.    Labs / Imaging / Path:  We reviewed her pathology report in detail.       Again, thank you for allowing me to participate in the care of your patient.        Sincerely,        Osorio Meyer MD  "

## 2022-06-15 NOTE — PROGRESS NOTES
FOLLOW-UP  Ed 15, 2022    Di Donaldson is a 76 year old female who returns for her 1st post-operative follow-up visit.    Cancer Staging  Malignant neoplasm of upper-outer quadrant of left breast in female, estrogen receptor negative (H)  Staging form: Breast, AJCC 8th Edition  - Clinical: Stage IA (cT1c, cN0, cM0, G2, ER-, NE-, HER2+) - Signed by Ryann Srinivasan MD on 2/1/2022      Treatment to date:  1. Neoadjuvant taxol, Herceptin (2/8/2022 to 4/26/2022)  2. Left RFID seed-localized segmental mastectomy and left axillary sentinel lymph node mapping and biopsy (6/1/2022)    HPI:    She underwent a left RFID seed-localized segmental mastectomy and SLNB on 6/1/2022.  She is currently 2 week(s) post-op.  Final surgical pathology showed [id number] with uninvolved margins, and 1/2 lymph nodes with treatment-related changes (the other node was benign).    Since the procedure, she has been doing well. She denies any redness or swelling, or drainage from the incision, or fever/chills.  She has good range of motion and denies any upper extremity swelling.      Physical Exam  Constitutional:       Appearance: She is well-developed.   Pulmonary:      Effort: No respiratory distress.   Chest:      Comments: Breast incision healing well.  No seroma. No cellulitis or abscess. No breast contour abnormality or nipple-areolar complex distortion.    Lymphadenopathy:      Comments: Axillary incision healing well without hematoma or cellulitis.  There is a 2.5 cm seroma.   Skin:     General: Skin is warm and dry.         INVESTIGATIONS:    Surgical Pathology (6/1/2022):  Final Diagnosis   A. Lymph node, LEFT axillary, sentinel #1, excision:  -One benign lymph node (0/1)  -No lymph node metastasis. Fibrosis and histiocytic aggregates suggesting possible treatment related changes     B. Lymph node, LEFT axillary, sentinel #2, excision:  -One benign lymph node (0/1)     C. LEFT breast, central, seed localized segmental  mastectomy:  -Microscopic focus of INVASIVE BREAST CARCINOMA OF NO SPECIAL TYPE (INVASIVE DUCTAL CARCINOMA), size 0.2 mm, residual after neoadjuvant chemotherapy  -No lymphovascular invasion identified  -Margins are uninvolved by invasive carcinoma  -Invasive carcinoma is > 5 mm from all surgical margins  -Treatment related changes  -Other findings: columnar cell change and fat necrosis  -Prior core biopsy site changes and marker identified  -Invasive carcinoma is estrogen receptor negative and progesterone receptor negative by immunohistochemistry and is HER2 amplified by FISH (HER2:CEN17 ratio >6.9) (performed on prior core biopsy, see report [id number])     ASSESSMENT:    Di Donaldson is a 76 year old female with LEFT breast cancer, s/p neoadjuvant systemic therapy and surgery.    She is healing well.  I recommended she start moisturizing and massaging the scar with Vaseline. She should continue to wear the supportive bra.  The axillary seroma will resorb over time. No intervention is indicated at this time.    We reviewed the pathology today and a copy of the report was provided. No further surgery is indicated.      We reviewed that she will also need adjuvant radiation therapy to complete breast conservation therapy; a referral to Radiation Oncology will be made. This is in light of the HER2 amplified subtype and treatment related changes seen in the axillary node.      She will follow up with Dr Meyer as well.  I have not made specific follow-up appointments with me at this time and will defer ongoing care to Dr Meyer.  She knows to call if she has concerns.     All of the above was discussed with the patient and her son Kareem by phone and all questions were answered.     PLAN:  1. Radiation oncology referral  2. Follow up with Dr Meyer  3. Follow up with me NEVILLE Srinivasan MD MSc [facility] FACS  Assistant Professor of Surgery  Division of Surgical Oncology  [facility]

## 2022-06-15 NOTE — NURSING NOTE
"Oncology Rooming Note    Tomasa 15, 2022 2:38 PM   Di Donaldson is a 76 year old female who presents for:    Chief Complaint   Patient presents with     Oncology Clinic Visit     Initial Vitals: /67 (BP Location: Left arm, Patient Position: Sitting)   Pulse 79   Temp 99  F (37.2  C) (Oral)   Ht 1.702 m (5' 7\")   Wt 94.3 kg (208 lb)   SpO2 98%   BMI 32.58 kg/m   Estimated body mass index is 32.58 kg/m  as calculated from the following:    Height as of this encounter: 1.702 m (5' 7\").    Weight as of this encounter: 94.3 kg (208 lb). Body surface area is 2.11 meters squared.  No Pain (0) Comment: Data Unavailable   No LMP recorded. Patient has had a hysterectomy.  Allergies reviewed: Yes  Medications reviewed: Yes    Medications: Medication refills not needed today.  Pharmacy name entered into EPIC:    [id number] - [place], [place] - [address] [id number] [place] [facility] [place] [place] [facility] [address] AT [facility] & [facility]          Tammie Calabrese LPN              "

## 2022-06-15 NOTE — PROGRESS NOTES
[facility] Hematology / Oncology  Progress Note  Name: Di Donaldson  :  1945    MRN:  [id number]    --------------------    Assessment / Plan:  Early-stage, left-sided, hormone negative, HER-2 positive breast cancer:    Di, her son Fredo and I reviewed her pathology report in detail.  She had an excellent response to neoadjuvant therapy with only a microscopic amount of residual HER2 positive breast cancer leftover at the time of surgery and most importantly negative lymph nodes.  From an adjuvant standpoint, I would recommend adjuvant radiotherapy which logistically would be closest to [place] for her.  Given travel time and travel distance, we will be hopeful that some course of hypofractionated breast radiotherapy could be considered potentially over 5 sessions, but ultimately this is up to the discretion of her treating radiation oncologist.  From an adjuvant systemic therapy standpoint, we agreed to Herceptin for additional of 9 months delivered intravenously every 3 weeks; we're following the Tol[place] protocol for early stage, HER2 positive breast cancer.  We will need to do some periodic cardiac monitoring.  One could consider switching to an alternative agent such as Kadcyla given the microscopic disease at the time of surgery, but given her pretty bad grade 2 to grade 3 neuropathy right now I have concerns that adjuvant Kadcyla could aggravate this even further.  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  She is in agreement with this plan.    Osorio Meyer MD    --------------------    Interval History:  Di returns for follow up of breast cancer.  All in all, she is doing quite well.  She is healing up from surgery well and without complications.  No breast concerns.  No lymphedema concerns.  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   "Is affecting her feet with some slight balance problems.  She is definitely clumsy or using her hands.    --------------------    Physical Exam:  VS: /67 (BP Location: Left arm, Patient Position: Sitting)   Pulse 79   Temp 99  F (37.2  C) (Oral)   Ht 1.702 m (5' 7\")   Wt 94.3 kg (208 lb)   SpO2 98%   BMI 32.58 kg/m    Gen: Well-appearing.    Labs / Imaging / Path:  We reviewed her pathology report in detail.   "

## 2022-06-15 NOTE — NURSING NOTE
Di Donaldson's goals for this visit include:   Chief Complaint   Patient presents with     Surgical Followup     2 week post op       She requests these members of her care team be copied on today's visit information: no    PCP: Osorio Meyer    Referring Provider:  Ryann Srinivasan MD  [address] [place] [address] [address]  [place] [address]    There were no vitals taken for this visit.    Do you need any medication refills at today's visit? no    Annalisa Donaldson LPN

## 2022-06-15 NOTE — PROGRESS NOTES
Notified Di that we received prior authorization approval for her genetic testing. Valid from 5/2/2022 to 10/31/2022.     Explained that insurance benefits may still apply, therefore, there could be an out of pocket cost.Provided her with estimated out of pocket cost for testing.    Di expressed understanding and stated that she wants to proceed with testing. Di indicated it is hard for her to travel to a [place] location, so I will send her a buccal swab kit via mail. [facility] Hold For Preauthorization: [[id number]] order was placed by a genetics provider.    YVES Ryan  Genomics Billing    [facility]   Molecular Diagnostics Laboratory  [address] [place] [address] [id number] [phone]

## 2022-06-17 RX ORDER — ACETAMINOPHEN 325 MG/1
650 TABLET ORAL
Status: CANCELLED
Start: 2022-08-04

## 2022-06-17 RX ORDER — MEPERIDINE HYDROCHLORIDE 25 MG/ML
25 INJECTION INTRAMUSCULAR; INTRAVENOUS; SUBCUTANEOUS EVERY 30 MIN PRN
Status: CANCELLED | OUTPATIENT
Start: 2022-08-04

## 2022-06-17 RX ORDER — EPINEPHRINE 1 MG/ML
0.3 INJECTION, SOLUTION INTRAMUSCULAR; SUBCUTANEOUS EVERY 5 MIN PRN
Status: CANCELLED | OUTPATIENT
Start: 2022-08-25

## 2022-06-17 RX ORDER — HEPARIN SODIUM,PORCINE 10 UNIT/ML
5 VIAL (ML) INTRAVENOUS
Status: CANCELLED | OUTPATIENT
Start: 2022-09-15

## 2022-06-17 RX ORDER — EPINEPHRINE 1 MG/ML
0.3 INJECTION, SOLUTION INTRAMUSCULAR; SUBCUTANEOUS EVERY 5 MIN PRN
Status: CANCELLED | OUTPATIENT
Start: 2022-09-15

## 2022-06-17 RX ORDER — HEPARIN SODIUM (PORCINE) LOCK FLUSH IV SOLN 100 UNIT/ML 100 UNIT/ML
5 SOLUTION INTRAVENOUS
Status: CANCELLED | OUTPATIENT
Start: 2022-08-25

## 2022-06-17 RX ORDER — METHYLPREDNISOLONE SODIUM SUCCINATE 125 MG/2ML
125 INJECTION, POWDER, LYOPHILIZED, FOR SOLUTION INTRAMUSCULAR; INTRAVENOUS
Status: CANCELLED
Start: 2022-09-15

## 2022-06-17 RX ORDER — ACETAMINOPHEN 325 MG/1
650 TABLET ORAL
Status: CANCELLED
Start: 2022-10-06

## 2022-06-17 RX ORDER — NALOXONE HYDROCHLORIDE 0.4 MG/ML
0.2 INJECTION, SOLUTION INTRAMUSCULAR; INTRAVENOUS; SUBCUTANEOUS
Status: CANCELLED | OUTPATIENT
Start: 2022-06-17

## 2022-06-17 RX ORDER — HEPARIN SODIUM,PORCINE 10 UNIT/ML
5 VIAL (ML) INTRAVENOUS
Status: CANCELLED | OUTPATIENT
Start: 2022-06-17

## 2022-06-17 RX ORDER — MEPERIDINE HYDROCHLORIDE 25 MG/ML
25 INJECTION INTRAMUSCULAR; INTRAVENOUS; SUBCUTANEOUS EVERY 30 MIN PRN
Status: CANCELLED | OUTPATIENT
Start: 2022-06-17

## 2022-06-17 RX ORDER — DIPHENHYDRAMINE HYDROCHLORIDE 50 MG/ML
50 INJECTION INTRAMUSCULAR; INTRAVENOUS
Status: CANCELLED
Start: 2022-08-25

## 2022-06-17 RX ORDER — ACETAMINOPHEN 325 MG/1
650 TABLET ORAL
Status: CANCELLED
Start: 2022-09-15

## 2022-06-17 RX ORDER — DIPHENHYDRAMINE HCL 25 MG
50 CAPSULE ORAL
Status: CANCELLED
Start: 2022-09-15

## 2022-06-17 RX ORDER — NALOXONE HYDROCHLORIDE 0.4 MG/ML
0.2 INJECTION, SOLUTION INTRAMUSCULAR; INTRAVENOUS; SUBCUTANEOUS
Status: CANCELLED | OUTPATIENT
Start: 2022-10-06

## 2022-06-17 RX ORDER — ALBUTEROL SULFATE 0.83 MG/ML
2.5 SOLUTION RESPIRATORY (INHALATION)
Status: CANCELLED | OUTPATIENT
Start: 2022-10-06

## 2022-06-17 RX ORDER — MEPERIDINE HYDROCHLORIDE 25 MG/ML
25 INJECTION INTRAMUSCULAR; INTRAVENOUS; SUBCUTANEOUS EVERY 30 MIN PRN
Status: CANCELLED | OUTPATIENT
Start: 2022-08-25

## 2022-06-17 RX ORDER — DIPHENHYDRAMINE HYDROCHLORIDE 50 MG/ML
50 INJECTION INTRAMUSCULAR; INTRAVENOUS
Status: CANCELLED
Start: 2022-08-04

## 2022-06-17 RX ORDER — ALBUTEROL SULFATE 0.83 MG/ML
2.5 SOLUTION RESPIRATORY (INHALATION)
Status: CANCELLED | OUTPATIENT
Start: 2022-09-15

## 2022-06-17 RX ORDER — ALBUTEROL SULFATE 90 UG/1
1-2 AEROSOL, METERED RESPIRATORY (INHALATION)
Status: CANCELLED
Start: 2022-08-04

## 2022-06-17 RX ORDER — ALBUTEROL SULFATE 0.83 MG/ML
2.5 SOLUTION RESPIRATORY (INHALATION)
Status: CANCELLED | OUTPATIENT
Start: 2022-08-04

## 2022-06-17 RX ORDER — NALOXONE HYDROCHLORIDE 0.4 MG/ML
0.2 INJECTION, SOLUTION INTRAMUSCULAR; INTRAVENOUS; SUBCUTANEOUS
Status: CANCELLED | OUTPATIENT
Start: 2022-08-04

## 2022-06-17 RX ORDER — ALBUTEROL SULFATE 0.83 MG/ML
2.5 SOLUTION RESPIRATORY (INHALATION)
Status: CANCELLED | OUTPATIENT
Start: 2022-06-17

## 2022-06-17 RX ORDER — METHYLPREDNISOLONE SODIUM SUCCINATE 125 MG/2ML
125 INJECTION, POWDER, LYOPHILIZED, FOR SOLUTION INTRAMUSCULAR; INTRAVENOUS
Status: CANCELLED
Start: 2022-10-06

## 2022-06-17 RX ORDER — ALBUTEROL SULFATE 90 UG/1
1-2 AEROSOL, METERED RESPIRATORY (INHALATION)
Status: CANCELLED
Start: 2022-09-15

## 2022-06-17 RX ORDER — ALBUTEROL SULFATE 90 UG/1
1-2 AEROSOL, METERED RESPIRATORY (INHALATION)
Status: CANCELLED
Start: 2022-08-25

## 2022-06-17 RX ORDER — EPINEPHRINE 1 MG/ML
0.3 INJECTION, SOLUTION INTRAMUSCULAR; SUBCUTANEOUS EVERY 5 MIN PRN
Status: CANCELLED | OUTPATIENT
Start: 2022-06-17

## 2022-06-17 RX ORDER — DIPHENHYDRAMINE HCL 25 MG
50 CAPSULE ORAL
Status: CANCELLED
Start: 2022-08-04

## 2022-06-17 RX ORDER — HEPARIN SODIUM (PORCINE) LOCK FLUSH IV SOLN 100 UNIT/ML 100 UNIT/ML
5 SOLUTION INTRAVENOUS
Status: CANCELLED | OUTPATIENT
Start: 2022-08-04

## 2022-06-17 RX ORDER — HEPARIN SODIUM (PORCINE) LOCK FLUSH IV SOLN 100 UNIT/ML 100 UNIT/ML
5 SOLUTION INTRAVENOUS
Status: CANCELLED | OUTPATIENT
Start: 2022-06-17

## 2022-06-17 RX ORDER — ACETAMINOPHEN 325 MG/1
650 TABLET ORAL
Status: CANCELLED
Start: 2022-08-25

## 2022-06-17 RX ORDER — ALBUTEROL SULFATE 0.83 MG/ML
2.5 SOLUTION RESPIRATORY (INHALATION)
Status: CANCELLED | OUTPATIENT
Start: 2022-08-25

## 2022-06-17 RX ORDER — DIPHENHYDRAMINE HYDROCHLORIDE 50 MG/ML
50 INJECTION INTRAMUSCULAR; INTRAVENOUS
Status: CANCELLED
Start: 2022-10-06

## 2022-06-17 RX ORDER — HEPARIN SODIUM,PORCINE 10 UNIT/ML
5 VIAL (ML) INTRAVENOUS
Status: CANCELLED | OUTPATIENT
Start: 2022-10-06

## 2022-06-17 RX ORDER — HEPARIN SODIUM (PORCINE) LOCK FLUSH IV SOLN 100 UNIT/ML 100 UNIT/ML
5 SOLUTION INTRAVENOUS
Status: CANCELLED | OUTPATIENT
Start: 2022-10-06

## 2022-06-17 RX ORDER — NALOXONE HYDROCHLORIDE 0.4 MG/ML
0.2 INJECTION, SOLUTION INTRAMUSCULAR; INTRAVENOUS; SUBCUTANEOUS
Status: CANCELLED | OUTPATIENT
Start: 2022-08-25

## 2022-06-17 RX ORDER — NALOXONE HYDROCHLORIDE 0.4 MG/ML
0.2 INJECTION, SOLUTION INTRAMUSCULAR; INTRAVENOUS; SUBCUTANEOUS
Status: CANCELLED | OUTPATIENT
Start: 2022-09-15

## 2022-06-17 RX ORDER — METHYLPREDNISOLONE SODIUM SUCCINATE 125 MG/2ML
125 INJECTION, POWDER, LYOPHILIZED, FOR SOLUTION INTRAMUSCULAR; INTRAVENOUS
Status: CANCELLED
Start: 2022-08-04

## 2022-06-17 RX ORDER — HEPARIN SODIUM,PORCINE 10 UNIT/ML
5 VIAL (ML) INTRAVENOUS
Status: CANCELLED | OUTPATIENT
Start: 2022-08-04

## 2022-06-17 RX ORDER — DIPHENHYDRAMINE HCL 25 MG
50 CAPSULE ORAL
Status: CANCELLED
Start: 2022-10-06

## 2022-06-17 RX ORDER — DIPHENHYDRAMINE HYDROCHLORIDE 50 MG/ML
50 INJECTION INTRAMUSCULAR; INTRAVENOUS
Status: CANCELLED
Start: 2022-06-17

## 2022-06-17 RX ORDER — ALBUTEROL SULFATE 90 UG/1
1-2 AEROSOL, METERED RESPIRATORY (INHALATION)
Status: CANCELLED
Start: 2022-06-17

## 2022-06-17 RX ORDER — METHYLPREDNISOLONE SODIUM SUCCINATE 125 MG/2ML
125 INJECTION, POWDER, LYOPHILIZED, FOR SOLUTION INTRAMUSCULAR; INTRAVENOUS
Status: CANCELLED
Start: 2022-06-17

## 2022-06-17 RX ORDER — MEPERIDINE HYDROCHLORIDE 25 MG/ML
25 INJECTION INTRAMUSCULAR; INTRAVENOUS; SUBCUTANEOUS EVERY 30 MIN PRN
Status: CANCELLED | OUTPATIENT
Start: 2022-10-06

## 2022-06-17 RX ORDER — METHYLPREDNISOLONE SODIUM SUCCINATE 125 MG/2ML
125 INJECTION, POWDER, LYOPHILIZED, FOR SOLUTION INTRAMUSCULAR; INTRAVENOUS
Status: CANCELLED
Start: 2022-08-25

## 2022-06-17 RX ORDER — HEPARIN SODIUM (PORCINE) LOCK FLUSH IV SOLN 100 UNIT/ML 100 UNIT/ML
5 SOLUTION INTRAVENOUS
Status: CANCELLED | OUTPATIENT
Start: 2022-09-15

## 2022-06-17 RX ORDER — ALBUTEROL SULFATE 90 UG/1
1-2 AEROSOL, METERED RESPIRATORY (INHALATION)
Status: CANCELLED
Start: 2022-10-06

## 2022-06-17 RX ORDER — ACETAMINOPHEN 325 MG/1
650 TABLET ORAL ONCE
Status: CANCELLED | OUTPATIENT
Start: 2022-06-17

## 2022-06-17 RX ORDER — MEPERIDINE HYDROCHLORIDE 25 MG/ML
25 INJECTION INTRAMUSCULAR; INTRAVENOUS; SUBCUTANEOUS EVERY 30 MIN PRN
Status: CANCELLED | OUTPATIENT
Start: 2022-09-15

## 2022-06-17 RX ORDER — DIPHENHYDRAMINE HCL 25 MG
50 CAPSULE ORAL
Status: CANCELLED
Start: 2022-08-25

## 2022-06-17 RX ORDER — EPINEPHRINE 1 MG/ML
0.3 INJECTION, SOLUTION INTRAMUSCULAR; SUBCUTANEOUS EVERY 5 MIN PRN
Status: CANCELLED | OUTPATIENT
Start: 2022-08-04

## 2022-06-17 RX ORDER — DIPHENHYDRAMINE HYDROCHLORIDE 50 MG/ML
50 INJECTION INTRAMUSCULAR; INTRAVENOUS
Status: CANCELLED
Start: 2022-09-15

## 2022-06-17 RX ORDER — DIPHENHYDRAMINE HCL 25 MG
50 CAPSULE ORAL ONCE
Status: CANCELLED | OUTPATIENT
Start: 2022-06-17

## 2022-06-17 RX ORDER — HEPARIN SODIUM,PORCINE 10 UNIT/ML
5 VIAL (ML) INTRAVENOUS
Status: CANCELLED | OUTPATIENT
Start: 2022-08-25

## 2022-06-17 RX ORDER — EPINEPHRINE 1 MG/ML
0.3 INJECTION, SOLUTION INTRAMUSCULAR; SUBCUTANEOUS EVERY 5 MIN PRN
Status: CANCELLED | OUTPATIENT
Start: 2022-10-06

## 2022-06-17 NOTE — PATIENT INSTRUCTIONS
1) Herceptin q3 weeks TN x 5 more cycles.  2) JASPREET TN 6 weeks w/ Herceptin above.  3) BJT 15 weeks w/ Herceptin above and ECHO.  4) Referral to rad/onc [place].    Osorio Meyer MD.

## 2022-06-21 ENCOUNTER — TELEPHONE (OUTPATIENT)
Dept: SURGERY | Facility: CLINIC | Age: 77
End: 2022-06-21

## 2022-06-21 NOTE — TELEPHONE ENCOUNTER
Received a staff message from Dr. Srinivasan requesting RN's assistance with a referral to [place] from Rad/Onc. RN called pt to inquire on which facility in [place] she would like RN to send referral to.  No answer. VM id's pt left detailed  Message with reason for call and to call RN back at [phone] ...Jayda Dumont RN

## 2022-06-23 NOTE — TELEPHONE ENCOUNTER
Patient left department a voicemail to call her back. Please assist. Thanks!  Valentina Brady, CMA on 6/23/2022 at 10:48 AM

## 2022-06-23 NOTE — TELEPHONE ENCOUNTER
RN called pt and relayed message below to pt regarding [place] clinic RN can send referral to for Rad/Onc. Pt states she does not know the clinic name but will find out from her PCP what the clinic name is and update RN via phone or vidCoinhart..Will await pt's update and will fax over referral as soon as possible Jayda Dumont RN

## 2022-06-27 NOTE — PROGRESS NOTES
[facility] Hematology / Oncology  Progress Note  Name: Di Donaldson  :  1945    MRN:  [id number]    --------------------    Assessment / Plan:  Early-stage, left-sided, hormone negative, HER-2 positive breast cancer:    Status post taxol / Herceptin.  Reviewed breast MRI which shows nice response to neoadjuvant therapy.  NOT a great candidate for anthracycline w/ age and comorbidities.  Will recommend proceeding to surgery at this time.  Anticipate could have residual disease and will discuss Herceptin vs Kadcyla.  Planning lumpectomy and will anticipate adjuvant radiation.  Taxol previously dos reduced due to neuropathy; anticipate will improvement w/ time.  Counts acceptable and will improve off taxol.    Osorio Meyer MD    --------------------    Interval History:  Di returns for follow up of breast cancer.    Doing alright all in all.  Balance a bit improved.  No more nosebleeds.  No nausea, vomiting.  Managing constipation alright.    --------------------    Physical Exam:  Video visit.    Labs / Imaging / Path:  We reviewed her 3 month trend of CBC, CMP and personally reviewed her pre and post breast MRI.     Video Visit:  Di is a 76 year old female who is being evaluated via a billable video visit.  }    Video start time: 3:02 PM  Video end time: 3:27 PM    [facility]  Patient location: Home    Mode of transmission:  [employer] 1 / [employer].

## 2022-06-30 ENCOUNTER — LAB (OUTPATIENT)
Dept: LAB | Facility: CLINIC | Age: 77
End: 2022-06-30
Payer: COMMERCIAL

## 2022-06-30 DIAGNOSIS — Z80.3 FAMILY HISTORY OF MALIGNANT NEOPLASM OF BREAST: ICD-10-CM

## 2022-06-30 DIAGNOSIS — Z80.0 FAMILY HISTORY OF COLON CANCER: ICD-10-CM

## 2022-06-30 DIAGNOSIS — C50.812 MALIGNANT NEOPLASM OF OVERLAPPING SITES OF LEFT BREAST IN FEMALE, ESTROGEN RECEPTOR NEGATIVE (H): Primary | ICD-10-CM

## 2022-06-30 DIAGNOSIS — Z17.1 MALIGNANT NEOPLASM OF OVERLAPPING SITES OF LEFT BREAST IN FEMALE, ESTROGEN RECEPTOR NEGATIVE (H): Primary | ICD-10-CM

## 2022-06-30 LAB
INTERPRETATION: NORMAL
INTERPRETATION: NORMAL
SIGNIFICANT RESULTS: NORMAL
SIGNIFICANT RESULTS: NORMAL
SPECIMEN DESCRIPTION: NORMAL
SPECIMEN DESCRIPTION: NORMAL
TEST DETAILS, MDL: NORMAL
TEST DETAILS, MDL: NORMAL

## 2022-06-30 PROCEDURE — 81162 BRCA1&2 GEN FULL SEQ DUP/DEL: CPT | Performed by: GENETIC COUNSELOR, MS

## 2022-06-30 NOTE — ADDENDUM NOTE
Addended by: FRANCISCO ALAN on: 6/30/2022 08:52 AM     Modules accepted: Orders     Encounter Date: 3/12/2022       History     Chief Complaint   Patient presents with    Dental Pain     Was seen yesterday by PCP and was prescribed ABX and stated if it got worse to come to the ED, 101 temps overnight      Sagar Woo is a 25 y.o. male with no significant PMHx who presents with a chief complaint of left sided oral pain and fever onset 2 days ago secondary to an ulcer. Patient has a sore in the left side of his mouth under his tongue. He seen his PCP and was prescribed antibiotics and was told to present to the ED if symptoms worsened. Associated symptoms include left jaw swelling and left tooth sensitivity. Pain is exacerbated with eating.  Patient reports no other identifying, alleviating, or exacerbating factors and denies any other associated symptoms at this time.         The history is provided by the patient, medical records and a significant other. No  was used.     Review of patient's allergies indicates:   Allergen Reactions    Valium [diazepam] Other (See Comments)     Become very irritated    Azithromycin Anxiety and Other (See Comments)     Past Medical History:   Diagnosis Date    Tobacco use      No past surgical history on file.  Family History   Problem Relation Age of Onset    No Known Problems Mother     No Known Problems Father      Social History     Tobacco Use    Smoking status: Current Every Day Smoker     Packs/day: 1.00     Years: 4.00     Pack years: 4.00     Types: Vaping with nicotine    Smokeless tobacco: Never Used   Substance Use Topics    Alcohol use: Yes     Alcohol/week: 0.0 standard drinks     Comment: social    Drug use: Yes     Types: Marijuana     Review of Systems   Constitutional: Positive for fever. Negative for chills.   HENT: Negative for nosebleeds.    Eyes: Negative for visual disturbance.   Respiratory: Negative for shortness of breath.    Cardiovascular: Negative for chest pain.   Gastrointestinal: Negative for  vomiting.   Genitourinary: Negative for frequency.   Musculoskeletal: Negative for joint swelling.   Skin: Negative for rash.   Neurological: Negative for numbness.   Hematological: Does not bruise/bleed easily.   Psychiatric/Behavioral: Negative for confusion.       Physical Exam     Initial Vitals [03/12/22 1350]   BP Pulse Resp Temp SpO2   131/66 100 18 98.9 °F (37.2 °C) 100 %      MAP       --         Physical Exam    Constitutional: He appears well-developed and well-nourished. He is not diaphoretic. No distress.   HENT:   Head: Normocephalic and atraumatic.   Eyes: EOM are normal. Pupils are equal, round, and reactive to light.   Large ulcer under the tongue on the left side   Neck: Neck supple.   Cardiovascular: Normal rate, regular rhythm and normal heart sounds.   Pulmonary/Chest: Breath sounds normal. No respiratory distress. He has no wheezes. He has no rales.   Abdominal: Abdomen is soft. Bowel sounds are normal. He exhibits no distension. There is no abdominal tenderness.   Musculoskeletal:         General: Normal range of motion.      Cervical back: Neck supple.     Lymphadenopathy:     He has cervical adenopathy.   Neurological: He is alert. He has normal strength. No cranial nerve deficit or sensory deficit. GCS score is 15. GCS eye subscore is 4. GCS verbal subscore is 5. GCS motor subscore is 6.             ED Course   Procedures  Labs Reviewed   CBC W/ AUTO DIFFERENTIAL - Abnormal; Notable for the following components:       Result Value    RBC 4.49 (*)     Hemoglobin 13.5 (*)     Lymph # 0.8 (*)     Gran % 74.4 (*)     Lymph % 12.1 (*)     All other components within normal limits   COMPREHENSIVE METABOLIC PANEL - Abnormal; Notable for the following components:    Potassium 3.4 (*)     Glucose 123 (*)     Alkaline Phosphatase 51 (*)     All other components within normal limits   RAPID HIV   SARS-COV-2 RDRP GENE    Narrative:     This test utilizes isothermal nucleic acid amplification    technology to detect the SARS-CoV-2 RdRp nucleic acid segment.   The analytical sensitivity (limit of detection) is 125 genome   equivalents/mL.   A POSITIVE result implies infection with the SARS-CoV-2 virus;   the patient is presumed to be contagious.     A NEGATIVE result means that SARS-CoV-2 nucleic acids are not   present above the limit of detection. A NEGATIVE result should be   treated as presumptive. It does not rule out the possibility of   COVID-19 and should not be the sole basis for treatment decisions.   If COVID-19 is strongly suspected based on clinical and exposure   history, re-testing using an alternate molecular assay should be   considered.   This test is only for use under the Food and Drug   Administration s Emergency Use Authorization (EUA).   Commercial kits are provided by [employer].   Performance characteristics of the EUA have been independently   verified by [facility] Department of   Pathology and Laboratory Medicine.   _________________________________________________________________   The authorized Fact Sheet for Healthcare Providers and the authorized Fact   Sheet for Patients of the ID NOW COVID-19 are available on the FDA   website:     https://www.fda.gov/media/589727/download  https://www.fda.gov/media/392727/download                  Imaging Results           CT Soft Tissue Neck With Contrast (Final result)  Result time 03/12/22 16:55:03    Final result by Ian Narayanan DO (03/12/22 16:55:03)                 Impression:      Enlarged striated enhancing bilateral palatine tonsils most compatible with pharyngitis.  Allowing for partial distortion from artifact from dental amalgam there is no peripheral enhancing collection to suggest definite abscess.  Clinical correlation correlation with direct visual inspection advised.    Enlarged bilateral jugulodigastric lymph nodes likely reactive.    Please note evaluation of the oral cavity is limited by dental  metal artifact      Electronically signed by: Ian Narayanan DO  Date:    03/12/2022  Time:    16:55             Narrative:    EXAMINATION:  CT SOFT TISSUE NECK WITH CONTRAST    CLINICAL HISTORY:  Lymphadenopathy, neck;    TECHNIQUE:  Low dose axial images as well as sagittal and coronal reconstructions were performed from the skull base to the clavicles following the intravenous administration of 75 mL of Omnipaque 350.    COMPARISON:  None    FINDINGS:  Pharynx/larynx: Nasopharynx is within normal limits.  Please note that the oropharynx is slightly distorted by artifact from dental metal.  There is enlargement of the tonsillar pillars bilaterally with striated enhancement configuration concerning for bilateral pharyngitis.  There is no evidence for focal peripheral enhancing collection to suggest drainable abscess allowing for distortion by artifacts clinical correlation and correlation with direct visual inspection is advised.    Hypopharynx, larynx and trachea are unremarkable.    Oral cavity and buccal space are distorted by dental metal artifact    There is enlarged bilateral jugulodigastric lymph nodes largest on the left measuring 1.7 cm.    Parotid glands, submandibular glands and thyroid glands without focal lesion.  Soft tissue density visualized anterior mediastinum suggestive for residual thymic tissue.    There is no consolidation visualized lung apices.    No evidence for acute fracture or subluxation cervical spine.    Enhancing vasculature throughout the neck is identified    There is small lobular opacity in the right frontal sinus which may represent mucous retention cyst with mild moderate patchy mucosal thickening ethmoid air cells bilaterally and somewhat lobular probable mucosal thickening maxillary antra bilaterally.    No evidence for acute fracture or subluxation cervical spine.  Small disc bulge C6/C7.    This report was flagged in Epic as abnormal.                                  Medications   iohexoL (OMNIPAQUE 350) injection 75 mL (75 mLs Intravenous Given 3/12/22 3220)   ketorolac injection 15 mg (15 mg Intravenous Given 3/12/22 1648)     Medical Decision Making:   History:   Old Medical Records: I decided to obtain old medical records.  Initial Assessment:   25-year-old healthy male with large ulcer under the tongue.  It seems to be left-sided so I would not be surprised if this was herpetic.  There is a neurologic component to the pain.  Will do basic labs, COVID, HIV and do a CT of the neck.    Clinical Tests:   Lab Tests: Reviewed and Ordered  Radiological Study: Reviewed and Ordered          Scribe Attestation:   Scribe #1: I performed the above scribed service and the documentation accurately describes the services I performed. I attest to the accuracy of the note.                 Clinical Impression:   Final diagnoses:  [B08.5] Acute herpangina (Primary)  [K12.1] Stomatitis          ED Disposition Condition    Discharge Stable        ED Prescriptions     Medication Sig Dispense Start Date End Date Auth. Provider    valACYclovir (VALTREX) 1000 MG tablet Take 1 tablet (1,000 mg total) by mouth 3 (three) times daily. for 7 days 21 tablet 3/12/2022 3/19/2022 Bro Fernandez MD    gabapentin (NEURONTIN) 100 MG capsule Take 1 capsule (100 mg total) by mouth 3 (three) times daily. for 10 days 30 capsule 3/12/2022 3/22/2022 Bro Fernandez MD        Follow-up Information     Follow up With Specialties Details Why Contact Info    Rick Miller DO Internal Medicine Call in 2 days  [address] [place] [address]  [phone]      [facility] - Emergency Dept Emergency Medicine   [address] [address] [phone]    [facility] ENT Otolaryngology Schedule an appointment as soon as possible for a visit in 2 days  [address] [address]  [phone]           Bro Fernandez MD  03/13/22 0772

## 2022-07-06 NOTE — TELEPHONE ENCOUNTER
Patient left VM regarding scheduling for radiation. Patient left a phone number of clinic that she would like the referral sent to [phone], [facility].        Pippa Roberts on 7/6/2022 at 4:32 PM

## 2022-07-07 NOTE — TELEPHONE ENCOUNTER
RN called number below and spoke with [facility] regarding referral for pt to their clinic. Referral and office visit notes/Op report/pathology and Imaging reports all sent via fax to [phone]. Successful transmission confirmed...Jayda Dumont RN

## 2022-07-07 NOTE — TELEPHONE ENCOUNTER
Left message for pt regarding message below and if she has any further questions to please call RN back at [phone]...Jayda Dumont RN

## 2022-07-13 RX ORDER — HEPARIN SODIUM (PORCINE) LOCK FLUSH IV SOLN 100 UNIT/ML 100 UNIT/ML
5 SOLUTION INTRAVENOUS
Status: CANCELLED | OUTPATIENT
Start: 2022-07-14

## 2022-07-14 ENCOUNTER — INFUSION THERAPY VISIT (OUTPATIENT)
Dept: INFUSION THERAPY | Facility: CLINIC | Age: 77
End: 2022-07-14
Attending: INTERNAL MEDICINE
Payer: COMMERCIAL

## 2022-07-14 VITALS
OXYGEN SATURATION: 97 % | RESPIRATION RATE: 16 BRPM | SYSTOLIC BLOOD PRESSURE: 155 MMHG | WEIGHT: 207.5 LBS | BODY MASS INDEX: 32.5 KG/M2 | DIASTOLIC BLOOD PRESSURE: 67 MMHG | TEMPERATURE: 98 F | HEART RATE: 71 BPM

## 2022-07-14 DIAGNOSIS — C50.412 MALIGNANT NEOPLASM OF UPPER-OUTER QUADRANT OF LEFT BREAST IN FEMALE, ESTROGEN RECEPTOR NEGATIVE (H): ICD-10-CM

## 2022-07-14 DIAGNOSIS — C50.112 MALIGNANT NEOPLASM OF CENTRAL PORTION OF LEFT BREAST IN FEMALE, ESTROGEN RECEPTOR NEGATIVE (H): Primary | ICD-10-CM

## 2022-07-14 DIAGNOSIS — C50.412 MALIGNANT NEOPLASM OF UPPER-OUTER QUADRANT OF LEFT BREAST IN FEMALE, ESTROGEN RECEPTOR NEGATIVE (H): Primary | ICD-10-CM

## 2022-07-14 DIAGNOSIS — Z17.1 MALIGNANT NEOPLASM OF CENTRAL PORTION OF LEFT BREAST IN FEMALE, ESTROGEN RECEPTOR NEGATIVE (H): Primary | ICD-10-CM

## 2022-07-14 DIAGNOSIS — Z17.1 MALIGNANT NEOPLASM OF UPPER-OUTER QUADRANT OF LEFT BREAST IN FEMALE, ESTROGEN RECEPTOR NEGATIVE (H): Primary | ICD-10-CM

## 2022-07-14 DIAGNOSIS — Z17.1 MALIGNANT NEOPLASM OF UPPER-OUTER QUADRANT OF LEFT BREAST IN FEMALE, ESTROGEN RECEPTOR NEGATIVE (H): ICD-10-CM

## 2022-07-14 PROCEDURE — 96413 CHEMO IV INFUSION 1 HR: CPT

## 2022-07-14 PROCEDURE — 250N000011 HC RX IP 250 OP 636: Performed by: INTERNAL MEDICINE

## 2022-07-14 PROCEDURE — 250N000013 HC RX MED GY IP 250 OP 250 PS 637: Performed by: INTERNAL MEDICINE

## 2022-07-14 PROCEDURE — 258N000003 HC RX IP 258 OP 636: Performed by: INTERNAL MEDICINE

## 2022-07-14 RX ORDER — ACETAMINOPHEN 325 MG/1
650 TABLET ORAL ONCE
Status: COMPLETED | OUTPATIENT
Start: 2022-07-14 | End: 2022-07-14

## 2022-07-14 RX ORDER — HEPARIN SODIUM (PORCINE) LOCK FLUSH IV SOLN 100 UNIT/ML 100 UNIT/ML
5 SOLUTION INTRAVENOUS
Status: DISCONTINUED | OUTPATIENT
Start: 2022-07-14 | End: 2022-07-14 | Stop reason: HOSPADM

## 2022-07-14 RX ORDER — DIPHENHYDRAMINE HCL 25 MG
50 CAPSULE ORAL ONCE
Status: COMPLETED | OUTPATIENT
Start: 2022-07-14 | End: 2022-07-14

## 2022-07-14 RX ADMIN — ACETAMINOPHEN 650 MG: 325 TABLET, FILM COATED ORAL at 13:32

## 2022-07-14 RX ADMIN — SODIUM CHLORIDE 720 MG: 9 INJECTION, SOLUTION INTRAVENOUS at 15:10

## 2022-07-14 RX ADMIN — SODIUM CHLORIDE 250 ML: 9 INJECTION, SOLUTION INTRAVENOUS at 13:34

## 2022-07-14 RX ADMIN — DIPHENHYDRAMINE HYDROCHLORIDE 50 MG: 25 CAPSULE ORAL at 13:32

## 2022-07-14 RX ADMIN — Medication 5 ML: at 16:33

## 2022-07-14 ASSESSMENT — PAIN SCALES - GENERAL: PAINLEVEL: NO PAIN (0)

## 2022-07-14 NOTE — PROGRESS NOTES
Port accessed for port labs prior to chemo / Traztuzumab.  No orders noted on treatment plan therefore none drawn.

## 2022-07-14 NOTE — PROGRESS NOTES
Trastuzumab ended and port de-accessed, vital signs taken and stable. Patient discharged without problems and walked down to where friend was waiting.

## 2022-07-14 NOTE — PROGRESS NOTES
Infusion Nursing Note:  Di Donaldson presents today for Trazimera.    Patient seen by provider today: No   present during visit today: Not Applicable.    Note: Here for restart Trazimera every 3 weeks.  Initial dose ran over 90 mins..    Intravenous Access:  Implanted Port.    Treatment Conditions:  Lab Results   Component Value Date    HGB 10.0 (L) 04/26/2022    WBC 7.1 04/26/2022    ANEUTAUTO 5.3 04/26/2022     04/26/2022      Lab Results   Component Value Date    BILITOTAL 0.3 03/29/2022    ALBUMIN 3.0 (L) 03/29/2022    ALT 20 03/29/2022    AST 13 03/29/2022     Results reviewed, labs MET treatment parameters, ok to proceed with treatment.    Post Infusion Assessment:  Patient tolerated infusion without incident.  Patient observed for 10 minutes post Trazimera.  Blood return noted pre and post infusion.  Site patent and intact, free from redness, edema or discomfort.  No evidence of extravasations.  Access discontinued per protocol.     Discharge Plan:   Discharge instructions reviewed with: Patient.  Patient discharged in stable condition accompanied by: friend.  Departure Mode: Ambulatory.      Jayda Palomares RN

## 2022-07-19 ENCOUNTER — TRANSFERRED RECORDS (OUTPATIENT)
Dept: RHEUMATOLOGY | Facility: CLINIC | Age: 77
End: 2022-07-19

## 2022-07-21 ENCOUNTER — TELEPHONE (OUTPATIENT)
Dept: ONCOLOGY | Facility: CLINIC | Age: 77
End: 2022-07-21

## 2022-07-28 PROCEDURE — G0452 MOLECULAR PATHOLOGY INTERPR: HCPCS | Mod: 26 | Performed by: STUDENT IN AN ORGANIZED HEALTH CARE EDUCATION/TRAINING PROGRAM

## 2022-08-01 NOTE — PROGRESS NOTES
Hematology/Oncology Visit    Oncologist: Dr. Meyer  Diagnosis: Stage IA invasive ductal carcinoma, left breast, HR-, HER2+  Reason for visit: exam prior to cycle 2 trazimera    Cancer and Treatment Hx:  Jan 2022: presented with a palpable left breast mass. Diagnostic US and mammogram with 1.2 x 1.5cm mass at the 4 o'clock position on the left breast, 4cm from the nipple. Left axilla negative for adenopathy. Biopsy on 1/12 revealed grade 2 invasive ductal carcinoma with micropapillary features, DCIS present. No angiolymphatic invasion. ER/IN negative, HER2+. Biopsy of left nipple ulceration was benign.  Feb 2022: initiated neoadjuvant weekly paclitaxel + trazimera.   Jun 2022: left lumpectomy with sentinel lymph node biopsy, pathology with [id number], negative margins, 1 of 2 lymph nodes with treatment related changes. Genetic testing without any pathogenic variants.  Jul 2022: trazimera    Interval History:  Di presents with a friend. She has been tolerating trazimera well and is without any acute symptomology. Peripheral neuropathy in her feet has been improved but is still present. She met with a radiation oncologist in [place] and is planning to start radiation next week, 20 fractions planned. She denies any new breast masses or breast skin changes. No new pain or unintentional weight loss. Eating and drinking well, daily BM.     Medications and labs:  - Reviewed pertinent medications.  - Reviewed labs from April    Physical Exam:   GEN: pleasantly conversant female no acute distress, at times hard of hearing   SKIN: generally intact, no visible rash, bruising, or sores   ENT: eyes non-icteric, no notable oral sores  LYMPH: no notable cervical, supraclavicular, or axillary lymphadenopathy, no axillary seroma noted  R BREAST: limited exam of incision which is C/D/I, non-tender to palpation, no seroma noted  RESP: clear to auscultation bilaterally, on room air  CARDS: regular rate and rhythm, no notable  "murmurs   GI: abd soft without notable hepatosplenomegaly, non-tender to palpation  MSK: no notable lower extremity edema  NEURO: alert and oriented without obvious focal deficit, gait stable  /64 (BP Location: Right arm, Patient Position: Sitting, Cuff Size: Adult Large)   Pulse 83   Temp 98.4  F (36.9  C) (Temporal)   Resp 16   Ht 1.702 m (5' 7\")   Wt 96.3 kg (212 lb 4 oz)   SpO2 100%   BMI 33.24 kg/m       Wt Readings from Last 4 Encounters:   08/03/22 96.3 kg (212 lb 4 oz)   07/14/22 94.1 kg (207 lb 8 oz)   06/15/22 94.3 kg (208 lb)   04/26/22 95.3 kg (210 lb 3.2 oz)     Assessment and Plan:  Early stage, HER2+ left breast cancer  - Status post neoadjuvant chemo, L lumpectomy, and ongoing HER2 directed therapy  - Will start L breast radiation in [place] next week, 20 planned fractions   - Requested patient have them fax documentation to us  - Plan to complete 1 year of HER2 directed therapy, started Feb 2022  - Interval echo next in October  - Follow up with Dr. Meyer next 10/6    Peripheral neuropathy bilateral feet  - Component of diabetes and prior paclitaxel  - Has been improved, continues on gabapentin three times daily      The total time of this encounter amounted to 30 minutes today. This time includes face-to-face time spent with the patient, prep work, ordering tests, and performing post-visit documentation.  - Keya Gallo, CNP    "

## 2022-08-03 ENCOUNTER — INFUSION THERAPY VISIT (OUTPATIENT)
Dept: INFUSION THERAPY | Facility: CLINIC | Age: 77
End: 2022-08-03
Attending: INTERNAL MEDICINE
Payer: COMMERCIAL

## 2022-08-03 ENCOUNTER — ONCOLOGY VISIT (OUTPATIENT)
Dept: ONCOLOGY | Facility: CLINIC | Age: 77
End: 2022-08-03
Payer: COMMERCIAL

## 2022-08-03 VITALS
HEART RATE: 83 BPM | SYSTOLIC BLOOD PRESSURE: 116 MMHG | TEMPERATURE: 98.4 F | DIASTOLIC BLOOD PRESSURE: 64 MMHG | WEIGHT: 212.25 LBS | BODY MASS INDEX: 33.31 KG/M2 | HEIGHT: 67 IN | RESPIRATION RATE: 16 BRPM | OXYGEN SATURATION: 100 %

## 2022-08-03 VITALS
HEART RATE: 69 BPM | WEIGHT: 212 LBS | SYSTOLIC BLOOD PRESSURE: 141 MMHG | OXYGEN SATURATION: 97 % | TEMPERATURE: 97.5 F | DIASTOLIC BLOOD PRESSURE: 64 MMHG | RESPIRATION RATE: 18 BRPM | BODY MASS INDEX: 33.27 KG/M2 | HEIGHT: 67 IN

## 2022-08-03 DIAGNOSIS — Z17.1 MALIGNANT NEOPLASM OF UPPER-OUTER QUADRANT OF LEFT BREAST IN FEMALE, ESTROGEN RECEPTOR NEGATIVE (H): Primary | ICD-10-CM

## 2022-08-03 DIAGNOSIS — C50.912 HER2-POSITIVE CARCINOMA OF LEFT BREAST (H): ICD-10-CM

## 2022-08-03 DIAGNOSIS — Z17.1 MALIGNANT NEOPLASM OF CENTRAL PORTION OF LEFT BREAST IN FEMALE, ESTROGEN RECEPTOR NEGATIVE (H): ICD-10-CM

## 2022-08-03 DIAGNOSIS — Z17.31 HER2-POSITIVE CARCINOMA OF LEFT BREAST (H): ICD-10-CM

## 2022-08-03 DIAGNOSIS — C50.412 MALIGNANT NEOPLASM OF UPPER-OUTER QUADRANT OF LEFT BREAST IN FEMALE, ESTROGEN RECEPTOR NEGATIVE (H): Primary | ICD-10-CM

## 2022-08-03 DIAGNOSIS — C50.112 MALIGNANT NEOPLASM OF CENTRAL PORTION OF LEFT BREAST IN FEMALE, ESTROGEN RECEPTOR NEGATIVE (H): ICD-10-CM

## 2022-08-03 DIAGNOSIS — G62.0 CHEMOTHERAPY-INDUCED NEUROPATHY (H): ICD-10-CM

## 2022-08-03 DIAGNOSIS — T45.1X5A CHEMOTHERAPY-INDUCED NEUROPATHY (H): ICD-10-CM

## 2022-08-03 PROCEDURE — 258N000003 HC RX IP 258 OP 636: Performed by: INTERNAL MEDICINE

## 2022-08-03 PROCEDURE — 250N000011 HC RX IP 250 OP 636: Performed by: INTERNAL MEDICINE

## 2022-08-03 PROCEDURE — 96413 CHEMO IV INFUSION 1 HR: CPT

## 2022-08-03 PROCEDURE — 99214 OFFICE O/P EST MOD 30 MIN: CPT | Mod: 24 | Performed by: NURSE PRACTITIONER

## 2022-08-03 RX ORDER — HEPARIN SODIUM (PORCINE) LOCK FLUSH IV SOLN 100 UNIT/ML 100 UNIT/ML
5 SOLUTION INTRAVENOUS
Status: DISCONTINUED | OUTPATIENT
Start: 2022-08-03 | End: 2022-08-03 | Stop reason: HOSPADM

## 2022-08-03 RX ORDER — EZETIMIBE 10 MG/1
10 TABLET ORAL
COMMUNITY
Start: 2022-07-23

## 2022-08-03 RX ADMIN — SODIUM CHLORIDE 250 ML: 9 INJECTION, SOLUTION INTRAVENOUS at 11:25

## 2022-08-03 RX ADMIN — Medication 5 ML: at 12:42

## 2022-08-03 RX ADMIN — SODIUM CHLORIDE 566 MG: 9 INJECTION, SOLUTION INTRAVENOUS at 11:53

## 2022-08-03 ASSESSMENT — PAIN SCALES - GENERAL
PAINLEVEL: MILD PAIN (3)
PAINLEVEL: MILD PAIN (3)

## 2022-08-03 NOTE — PATIENT INSTRUCTIONS
Today:  Please follow up as scheduled below:    Please follow up with Dr. Meyer (virtual) .  Please complete an echo prior to follow up appointment.    Video visit follow up with Dr. Meyer on Date/Time: 10/06/22 @ 10:00 am in [place]. Please check in at 9:45 am    Infusion to follow at: 10/06/22 @ 10:30 am in [place]    Echo: 10/06/22 @ 1:00 pm in [place] (Sorry but there was no availability for before your virtual visit with Dr. Meyer)    If you have any questions or concerns please feel free to call.    If you need to reschedule please call:  Clinic or Lab Appointment - [phone]  Infusion - [phone]  Imaging - [phone]    Grace Sheppard [facility]   Oncology/Hematology  [employer]  [phone]    After Hours Oncology Nurse Line - [phone]

## 2022-08-03 NOTE — PROGRESS NOTES
Infusion Nursing Note:  Di PIÑA Mendoza presents today for C2D1 Trazimera  Patient seen by provider today: Yes: Keya Gallo, KAT   present during visit today: Not Applicable.    Note: Patient doing well. No complaints.  Tolerated last infusion well. .    Intravenous Access:  Implanted Port.    Treatment Conditions:  Not Applicable.    Post Infusion Assessment:  Patient tolerated infusion without incident.  Patient observed for 15 minutes post trazimera per protocol.  Blood return noted pre and post infusion.  Site patent and intact, free from redness, edema or discomfort.  Access discontinued per protocol.     Discharge Plan:   Discharge instructions reviewed with: Patient.  Patient and/or family verbalized understanding of discharge instructions and all questions answered.  Patient discharged in stable condition accompanied by: self.  Departure Mode: Ambulatory.      Sharmin Colbert RN

## 2022-08-03 NOTE — NURSING NOTE
DISCHARGE PLAN:  Next appointments: See patient instruction section  Departure Mode: Ambulatory  Accompanied by: friend?  5 minutes for nursing discharge (face to face time)     Di PIÑA Mendoza is here today for oncology follow up.  Patient was not seen by writing nurse at time of appointment.   Patient to infusion and I told her I would tube up the AVS and calendar after I schedule the echo.  Imaging scheduled if ordered.  See patient instructions and Oncologist's Progress note for further details. Questions and concerns addressed to patient's satisfaction. Patient verbalized and demonstrated understanding of plan.  Contact information provided and patient is encouraged to call with any that arise in the interim of care.    Grace PIÑA [facility]  [phone]  8/3/2022, 11:14 AM

## 2022-08-03 NOTE — NURSING NOTE
Barbara informed me that the pt left and didn't get her ppw. I re-printed and mailed it out to her.  Grace Sheppard, [facility]

## 2022-08-09 ENCOUNTER — VIRTUAL VISIT (OUTPATIENT)
Dept: ONCOLOGY | Facility: CLINIC | Age: 77
End: 2022-08-09
Attending: GENETIC COUNSELOR, MS
Payer: COMMERCIAL

## 2022-08-09 DIAGNOSIS — C50.112 MALIGNANT NEOPLASM OF CENTRAL PORTION OF LEFT BREAST IN FEMALE, ESTROGEN RECEPTOR NEGATIVE (H): Primary | ICD-10-CM

## 2022-08-09 DIAGNOSIS — Z80.3 FAMILY HISTORY OF MALIGNANT NEOPLASM OF BREAST: ICD-10-CM

## 2022-08-09 DIAGNOSIS — Z17.1 MALIGNANT NEOPLASM OF CENTRAL PORTION OF LEFT BREAST IN FEMALE, ESTROGEN RECEPTOR NEGATIVE (H): Primary | ICD-10-CM

## 2022-08-09 DIAGNOSIS — Z80.0 FAMILY HISTORY OF COLON CANCER: ICD-10-CM

## 2022-08-09 PROCEDURE — 999N000069 HC STATISTIC GENETIC COUNSELING, < 16 MIN: Mod: TEL,95 | Performed by: GENETIC COUNSELOR, MS

## 2022-08-09 NOTE — PROGRESS NOTES
"8/9/2022    Referring Provider: Osorio Meyer MD    Presenting Information:  Di returned to the Cancer Risk Management Program (virtual visit to discuss her genetic testing results.     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 This variant is called c.2732T>G (p.Xxs332Cct). Given the uncertain significance of this result, medical management decisions should NOT be made based on this test result alone.    No pathogenic or likely pathogenic variants were detected in the genes analyzed.  Hereditary Cancer - Comprehensive 40 Gene Panel:APC, KELSI, AXIN2, BAP1, BARD1, BMPR1A, BRCA1, BRCA2, BRIP1, CDH1, CDK4, CDKN2A, CHEK2, DICER1, EPCAM, GREM1, HOXB13, MITF, MLH1, MRE11, MSH2, MSH3, MSH6, MUTYH, NBN, NF1, NTHL1, PALB2, PMS2, POLD1, POLE, POT1, PTEN, RAD50, RAD51C, RAD51D, SMAD4, SMARCA4, STK11, TP53    A copy of the test report can be found in the Laboratory tab, dated 6/30/2022, and named \"Next generation sequencing\".     Interpretation:  We discussed several different interpretations of this inconclusive test result. It is not clear if this variant in the MSH3 gene is associated with increased cancer risk.  1. This variant may be a benign change that does not increase cancer risk.  2. This variant may be a harmful mutation that causes associated with autosomal recessive MSH3-associated polyposis.  Carrying just one mutation in the MSH3 gene is not thought to significantly increase cancer risk.      Genetic testing labs are working to collect evidence about if this variant is harmful or benign, and they will contact me if it is reclassified. If this variant is determined to be a benign change, there may be a different gene or combination of genes and environment that are associated with the cancers in this family.    It is also important to consider that a cancer susceptibility gene may still be present in " Di's family which she did not inherit.     Screening:  Based on this inconclusive test result, it is important for Di and her relatives to refer back to the family history for appropriate cancer screening.      Di s close female relatives remain at increased risk for breast cancer given their family history. Breast cancer screening is generally recommended to begin approximately 10 years younger than the earliest age of breast cancer diagnosis in the family, or at age 40, whichever comes first. Breast screening options should be discussed with an individual's primary care provider and a genetic counselor, to determine at what age to begin screening, what screening is appropriate, and if additional screening (such as breast MRI) is necessary based on personal/family history factors.    Per National Comprehensive Cancer Network (NCCN) guidelines, individuals with a first degree relative with colorectal cancer diagnosed at any age should begin colonoscopy at age 40, or 10 years before the earliest diagnosis of colorectal cancer, whichever is first.  Colonoscopy should be repeated every 5 years, or based on colonoscopy findings.  These recommendations may change based on personal and family history as well as personal preference, and should be discussed with an individuals physician.          Other population cancer screening options, such as those recommended by the American Cancer Society and the National Comprehensive Cancer Network (NCCN), are also appropriate for Di and her family. These screening recommendations may change if there are changes to Di's personal and/or family history of cancer. Final screening recommendations should be made by each individual's primary care provider.    Additional Testing Considerations:  Although Di's genetic testing result is inconclusive, other relatives may still carry a harmful gene mutation associated with the cancers in her family. Genetic counseling  is recommended for her close relatives who have a history of cancer to discuss genetic testing options. If any of these relatives do pursue genetic testing, Di is encouraged to contact me so that we may review the impact of their test results on her.    Summary:  We do not have an explanation for Di's personal or family history of breast cancer. While no obviously harmful genetic changes were identified, Di may still be at risk for certain cancers due to family history, environmental factors, or other genetic causes not identified by this test.  Because of that, it is important that she continue with cancer screening based on her personal and family history as discussed above.    Genetic testing is rapidly advancing, and new cancer susceptibility genes will most likely be identified in the future. Therefore, I encouraged Di to contact me annually or if there are changes in her personal or family history. This may change how we assess her cancer risk, screening, and the testing we would offer.    Plan:  1.  I provided Di with a copy of her test results today.    2.  She plans to follow-up with her managing physicians.  3.  She should contact me regularly, or sooner if her family history changes.  4.  I will contact Di if the laboratory informs me that this VUS has been reclassified.  This may change screening and testing recommendations for Di and her relatives.    If Di has any further questions, I encouraged her to contact me at  [phone].    Time spent over phone: 5 minutes    Gretchen Galvin MS, [facility] [place]  Licensed, Certified Genetic Counselor  [facility]

## 2022-08-09 NOTE — LETTER
"    Cancer Risk Management  Program Locations    [facility]  [facility]  [facility]  Mailing Address  Cancer Risk Management Program  [address]  [place] [address]    New patient appointments  [phone]  August 10, 2022    Di PIÑA Mendoza  [address] [place] [address]      Dear Di,  It was a pleasure talking with you via your virtual genetic counseling visit on 08/9/2022.  Below is a copy of the progress note from that recent visit through the Cancer Risk Management Program.  If you have any additional questions, please feel free to contact me.    Referring Provider: Osorio Meyer MD    Presenting Information:  Di returned to the Cancer Risk Management Program (virtual visit to discuss her genetic testing results.     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 This variant is called c.2732T>G (p.[id number]). Given the uncertain significance of this result, medical management decisions should NOT be made based on this test result alone.    No pathogenic or likely pathogenic variants were detected in the genes analyzed.  Hereditary Cancer - Comprehensive 40 Gene Panel:APC, KELSI, AXIN2, BAP1, BARD1, BMPR1A, BRCA1, BRCA2, BRIP1, CDH1, CDK4, CDKN2A, CHEK2, DICER1, EPCAM, GREM1, HOXB13, MITF, MLH1, MRE11, MSH2, MSH3, MSH6, MUTYH, NBN, NF1, NTHL1, PALB2, PMS2, POLD1, POLE, POT1, PTEN, RAD50, RAD51C, RAD51D, SMAD4, SMARCA4, STK11, TP53    A copy of the test report can be found in the Laboratory tab, dated 6/30/2022, and named \"Next generation sequencing\".     Interpretation:  We discussed several different interpretations of this inconclusive test result. It is not clear if this variant in the MSH3 gene is " associated with increased cancer risk.  1. This variant may be a benign change that does not increase cancer risk.  2. This variant may be a harmful mutation that causes associated with autosomal recessive MSH3-associated polyposis.  Carrying just one mutation in the MSH3 gene is not thought to significantly increase cancer risk.      Genetic testing labs are working to collect evidence about if this variant is harmful or benign, and they will contact me if it is reclassified. If this variant is determined to be a benign change, there may be a different gene or combination of genes and environment that are associated with the cancers in this family.    It is also important to consider that a cancer susceptibility gene may still be present in Di's family which she did not inherit.     Screening:  Based on this inconclusive test result, it is important for Di and her relatives to refer back to the family history for appropriate cancer screening.      Di s close female relatives remain at increased risk for breast cancer given their family history. Breast cancer screening is generally recommended to begin approximately 10 years younger than the earliest age of breast cancer diagnosis in the family, or at age 40, whichever comes first. Breast screening options should be discussed with an individual's primary care provider and a genetic counselor, to determine at what age to begin screening, what screening is appropriate, and if additional screening (such as breast MRI) is necessary based on personal/family history factors.    Per National Comprehensive Cancer Network (NCCN) guidelines, individuals with a first degree relative with colorectal cancer diagnosed at any age should begin colonoscopy at age 40, or 10 years before the earliest diagnosis of colorectal cancer, whichever is first.  Colonoscopy should be repeated every 5 years, or based on colonoscopy findings.  These recommendations may change based on  personal and family history as well as personal preference, and should be discussed with an individuals physician.          Other population cancer screening options, such as those recommended by the American Cancer Society and the National Comprehensive Cancer Network (NCCN), are also appropriate for Di and her family. These screening recommendations may change if there are changes to Di's personal and/or family history of cancer. Final screening recommendations should be made by each individual's primary care provider.    Additional Testing Considerations:  Although Di's genetic testing result is inconclusive, other relatives may still carry a harmful gene mutation associated with the cancers in her family. Genetic counseling is recommended for her close relatives who have a history of cancer to discuss genetic testing options. If any of these relatives do pursue genetic testing, Di is encouraged to contact me so that we may review the impact of their test results on her.    Summary:  We do not have an explanation for Di's personal or family history of breast cancer. While no obviously harmful genetic changes were identified, Di may still be at risk for certain cancers due to family history, environmental factors, or other genetic causes not identified by this test.  Because of that, it is important that she continue with cancer screening based on her personal and family history as discussed above.    Genetic testing is rapidly advancing, and new cancer susceptibility genes will most likely be identified in the future. Therefore, I encouraged Di to contact me annually or if there are changes in her personal or family history. This may change how we assess her cancer risk, screening, and the testing we would offer.    Plan:  1.  I provided Di with a copy of her test results today.    2.  She plans to follow-up with her managing physicians.  3.  She should contact me regularly, or  sooner if her family history changes.  4.  I will contact Di if the laboratory informs me that this VUS has been reclassified.  This may change screening and testing recommendations for Di and her relatives.    If Di has any further questions, I encouraged her to contact me at  [phone].    Gretchen Galvin MS, [facility]  Licensed, Certified Genetic Counselor  [facility]

## 2022-08-09 NOTE — PROGRESS NOTES
Di is a 76 year old who is being evaluated via a billable video visit.   Visit was switched to telephone.    Originating Location (pt. Location): Home    Distant Location (provider location):  [facility]

## 2022-08-09 NOTE — LETTER
"    8/9/2022         RE: Di Donaldson  [address] [place] [address]        Dear Colleague,    Thank you for referring your patient, Di Donaldson, to the [facility]. Please see a copy of my visit note below.    Di is a 76 year old who is being evaluated via a billable video visit.   Visit was switched to telephone.    Originating Location (pt. Location): Home    Distant Location (provider location):  [facility]       8/9/2022    Referring Provider: Osorio Meyer MD    Presenting Information:  Di returned to the Cancer Risk Management Program (virtual visit to discuss her genetic testing results.     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 This variant is called c.2732T>G (p.[id number]). Given the uncertain significance of this result, medical management decisions should NOT be made based on this test result alone.    No pathogenic or likely pathogenic variants were detected in the genes analyzed.  Hereditary Cancer - Comprehensive 40 Gene Panel:APC, KELSI, AXIN2, BAP1, BARD1, BMPR1A, BRCA1, BRCA2, BRIP1, CDH1, CDK4, CDKN2A, CHEK2, DICER1, EPCAM, GREM1, HOXB13, MITF, MLH1, MRE11, MSH2, MSH3, MSH6, MUTYH, NBN, NF1, NTHL1, PALB2, PMS2, POLD1, POLE, POT1, PTEN, RAD50, RAD51C, RAD51D, SMAD4, SMARCA4, STK11, TP53    A copy of the test report can be found in the Laboratory tab, dated 6/30/2022, and named \"Next generation sequencing\".     Interpretation:  We discussed several different interpretations of this inconclusive test result. It is not clear if this variant in the MSH3 gene is associated with increased cancer risk.  1. This variant may be a benign change that does not increase cancer risk.  2. This variant may be a harmful mutation that causes associated with autosomal recessive MSH3-associated polyposis.  Carrying just one " mutation in the MSH3 gene is not thought to significantly increase cancer risk.      Genetic testing labs are working to collect evidence about if this variant is harmful or benign, and they will contact me if it is reclassified. If this variant is determined to be a benign change, there may be a different gene or combination of genes and environment that are associated with the cancers in this family.    It is also important to consider that a cancer susceptibility gene may still be present in Di's family which she did not inherit.     Screening:  Based on this inconclusive test result, it is important for Di and her relatives to refer back to the family history for appropriate cancer screening.      Di s close female relatives remain at increased risk for breast cancer given their family history. Breast cancer screening is generally recommended to begin approximately 10 years younger than the earliest age of breast cancer diagnosis in the family, or at age 40, whichever comes first. Breast screening options should be discussed with an individual's primary care provider and a genetic counselor, to determine at what age to begin screening, what screening is appropriate, and if additional screening (such as breast MRI) is necessary based on personal/family history factors.    Per National Comprehensive Cancer Network (NCCN) guidelines, individuals with a first degree relative with colorectal cancer diagnosed at any age should begin colonoscopy at age 40, or 10 years before the earliest diagnosis of colorectal cancer, whichever is first.  Colonoscopy should be repeated every 5 years, or based on colonoscopy findings.  These recommendations may change based on personal and family history as well as personal preference, and should be discussed with an individuals physician.          Other population cancer screening options, such as those recommended by the American Cancer Society and the National  Comprehensive Cancer Network (NCCN), are also appropriate for Di and her family. These screening recommendations may change if there are changes to Di's personal and/or family history of cancer. Final screening recommendations should be made by each individual's primary care provider.    Additional Testing Considerations:  Although Di's genetic testing result is inconclusive, other relatives may still carry a harmful gene mutation associated with the cancers in her family. Genetic counseling is recommended for her close relatives who have a history of cancer to discuss genetic testing options. If any of these relatives do pursue genetic testing, Di is encouraged to contact me so that we may review the impact of their test results on her.    Summary:  We do not have an explanation for Di's personal or family history of breast cancer. While no obviously harmful genetic changes were identified, Di may still be at risk for certain cancers due to family history, environmental factors, or other genetic causes not identified by this test.  Because of that, it is important that she continue with cancer screening based on her personal and family history as discussed above.    Genetic testing is rapidly advancing, and new cancer susceptibility genes will most likely be identified in the future. Therefore, I encouraged Di to contact me annually or if there are changes in her personal or family history. This may change how we assess her cancer risk, screening, and the testing we would offer.    Plan:  1.  I provided Di with a copy of her test results today.    2.  She plans to follow-up with her managing physicians.  3.  She should contact me regularly, or sooner if her family history changes.  4.  I will contact Di if the laboratory informs me that this VUS has been reclassified.  This may change screening and testing recommendations for Di and her relatives.    If Di has any further  questions, I encouraged her to contact me at  [phone].    Time spent over phone: 5 minutes          Again, thank you for allowing me to participate in the care of your patient.      Sincerely,    Gretchen Galvin GC

## 2022-08-18 ENCOUNTER — TELEPHONE (OUTPATIENT)
Dept: SPIRITUAL SERVICES | Facility: CLINIC | Age: 77
End: 2022-08-18

## 2022-08-18 NOTE — TELEPHONE ENCOUNTER
SPIRITUAL HEALTH SERVICES Phone Encounter Note  [facility] [place]    Reason for Contact: Pt Di Donaldson is followed at [facility] in [place]. This author was referred to contact her because she recently scored positive on the oncology distress screen for potential spiritual distress.    Intervention: Reviewed documentation.  I rang Di at her cell and home number. 3 attempts were made, but we kept getting disconnected.    Plan:  I will try again another day.    SH will follow.    Rev Moni Urias  [facility] Phone Line [phone]  [place] (Tuesdays & Thursdays) [phone]

## 2022-08-25 ENCOUNTER — INFUSION THERAPY VISIT (OUTPATIENT)
Dept: INFUSION THERAPY | Facility: CLINIC | Age: 77
End: 2022-08-25
Attending: INTERNAL MEDICINE
Payer: COMMERCIAL

## 2022-08-25 VITALS
DIASTOLIC BLOOD PRESSURE: 75 MMHG | RESPIRATION RATE: 18 BRPM | HEART RATE: 62 BPM | BODY MASS INDEX: 33.52 KG/M2 | SYSTOLIC BLOOD PRESSURE: 174 MMHG | TEMPERATURE: 97.9 F | OXYGEN SATURATION: 98 % | WEIGHT: 214 LBS

## 2022-08-25 DIAGNOSIS — C50.412 MALIGNANT NEOPLASM OF UPPER-OUTER QUADRANT OF LEFT BREAST IN FEMALE, ESTROGEN RECEPTOR NEGATIVE (H): Primary | ICD-10-CM

## 2022-08-25 DIAGNOSIS — C50.112 MALIGNANT NEOPLASM OF CENTRAL PORTION OF LEFT BREAST IN FEMALE, ESTROGEN RECEPTOR NEGATIVE (H): ICD-10-CM

## 2022-08-25 DIAGNOSIS — Z17.1 MALIGNANT NEOPLASM OF UPPER-OUTER QUADRANT OF LEFT BREAST IN FEMALE, ESTROGEN RECEPTOR NEGATIVE (H): Primary | ICD-10-CM

## 2022-08-25 DIAGNOSIS — Z17.1 MALIGNANT NEOPLASM OF CENTRAL PORTION OF LEFT BREAST IN FEMALE, ESTROGEN RECEPTOR NEGATIVE (H): ICD-10-CM

## 2022-08-25 PROCEDURE — 96413 CHEMO IV INFUSION 1 HR: CPT

## 2022-08-25 PROCEDURE — 258N000003 HC RX IP 258 OP 636: Performed by: INTERNAL MEDICINE

## 2022-08-25 PROCEDURE — 250N000011 HC RX IP 250 OP 636: Performed by: INTERNAL MEDICINE

## 2022-08-25 RX ORDER — HEPARIN SODIUM (PORCINE) LOCK FLUSH IV SOLN 100 UNIT/ML 100 UNIT/ML
5 SOLUTION INTRAVENOUS
Status: DISCONTINUED | OUTPATIENT
Start: 2022-08-25 | End: 2022-08-25 | Stop reason: HOSPADM

## 2022-08-25 RX ADMIN — SODIUM CHLORIDE 250 ML: 9 INJECTION, SOLUTION INTRAVENOUS at 12:05

## 2022-08-25 RX ADMIN — Medication 5 ML: at 13:02

## 2022-08-25 RX ADMIN — SODIUM CHLORIDE 570 MG: 9 INJECTION, SOLUTION INTRAVENOUS at 12:08

## 2022-08-25 NOTE — PROGRESS NOTES
Infusion Nursing Note:  Di Donaldson presents today for Truxima.    Patient seen by provider today: No   present during visit today: Not Applicable.    Note:NA.    Intravenous Access:  Implanted Port.    Treatment Conditions:  Not Applicable.    Post Infusion Assessment:  Patient tolerated infusion without incident.  Patient observed for 15 minutes post Keytruda per protocol.  Blood return noted pre and post infusion.  Site patent and intact, free from redness, edema or discomfort.  No evidence of extravasations.  Access discontinued per protocol.     Discharge Plan:   Discharge instructions reviewed with: Patient.  Patient discharged in stable condition accompanied by:  friend.  Departure Mode: Ambulatory.      Jayda Palomares RN

## 2022-09-01 ENCOUNTER — TRANSFERRED RECORDS (OUTPATIENT)
Dept: HEALTH INFORMATION MANAGEMENT | Facility: CLINIC | Age: 77
End: 2022-09-01

## 2022-09-15 ENCOUNTER — INFUSION THERAPY VISIT (OUTPATIENT)
Dept: INFUSION THERAPY | Facility: CLINIC | Age: 77
End: 2022-09-15
Attending: INTERNAL MEDICINE
Payer: COMMERCIAL

## 2022-09-15 VITALS
WEIGHT: 218.4 LBS | RESPIRATION RATE: 20 BRPM | DIASTOLIC BLOOD PRESSURE: 54 MMHG | OXYGEN SATURATION: 97 % | TEMPERATURE: 98 F | HEART RATE: 70 BPM | SYSTOLIC BLOOD PRESSURE: 140 MMHG | BODY MASS INDEX: 34.21 KG/M2

## 2022-09-15 DIAGNOSIS — Z17.1 MALIGNANT NEOPLASM OF CENTRAL PORTION OF LEFT BREAST IN FEMALE, ESTROGEN RECEPTOR NEGATIVE (H): ICD-10-CM

## 2022-09-15 DIAGNOSIS — C50.412 MALIGNANT NEOPLASM OF UPPER-OUTER QUADRANT OF LEFT BREAST IN FEMALE, ESTROGEN RECEPTOR NEGATIVE (H): Primary | ICD-10-CM

## 2022-09-15 DIAGNOSIS — Z17.1 MALIGNANT NEOPLASM OF UPPER-OUTER QUADRANT OF LEFT BREAST IN FEMALE, ESTROGEN RECEPTOR NEGATIVE (H): Primary | ICD-10-CM

## 2022-09-15 DIAGNOSIS — C50.112 MALIGNANT NEOPLASM OF CENTRAL PORTION OF LEFT BREAST IN FEMALE, ESTROGEN RECEPTOR NEGATIVE (H): ICD-10-CM

## 2022-09-15 PROCEDURE — 96413 CHEMO IV INFUSION 1 HR: CPT

## 2022-09-15 PROCEDURE — 250N000011 HC RX IP 250 OP 636: Performed by: INTERNAL MEDICINE

## 2022-09-15 PROCEDURE — 258N000003 HC RX IP 258 OP 636: Performed by: INTERNAL MEDICINE

## 2022-09-15 RX ORDER — MIRABEGRON 50 MG/1
50 TABLET, EXTENDED RELEASE ORAL DAILY
COMMUNITY

## 2022-09-15 RX ORDER — HEPARIN SODIUM (PORCINE) LOCK FLUSH IV SOLN 100 UNIT/ML 100 UNIT/ML
5 SOLUTION INTRAVENOUS
Status: DISCONTINUED | OUTPATIENT
Start: 2022-09-15 | End: 2022-09-15 | Stop reason: HOSPADM

## 2022-09-15 RX ADMIN — Medication 5 ML: at 12:32

## 2022-09-15 RX ADMIN — SODIUM CHLORIDE 250 ML: 9 INJECTION, SOLUTION INTRAVENOUS at 11:07

## 2022-09-15 RX ADMIN — SODIUM CHLORIDE 570 MG: 9 INJECTION, SOLUTION INTRAVENOUS at 11:48

## 2022-09-15 ASSESSMENT — PAIN SCALES - GENERAL: PAINLEVEL: NO PAIN (0)

## 2022-09-15 NOTE — PROGRESS NOTES
Infusion Nursing Note:  Di Donaldson presents today for C4D1 Traztuzumab.    Patient seen by provider today: No   present during visit today: Not Applicable.    Note: Patient feeling good overall. Completed L brst radiation September 1st. Feeling less fatigue than previous. Denies fever, cough or chills. LE neuropathy, balance and motor function ok but feet are more bothersome every once in a while. VSS, afebrile.     Intravenous Access:  Implanted Port.    Treatment Conditions:  ECHO/MUGA completed 2/2/22  EF 60-65%.  Is scheduled for next ECHO on 10/6/22.    Post Infusion Assessment:  Patient tolerated infusion without incident.  Blood return noted pre and post infusion.  Site patent and intact, free from redness, edema or discomfort.  No evidence of extravasations.  Access discontinued per protocol.     Discharge Plan:   Copy of AVS reviewed with patient. Patient will return 10/6 for next appointment.  Patient discharged in stable condition accompanied by: self.  Departure Mode: Ambulatory.      Carey Bowen RN

## 2022-09-27 ENCOUNTER — TELEPHONE (OUTPATIENT)
Dept: SPIRITUAL SERVICES | Facility: CLINIC | Age: 77
End: 2022-09-27

## 2022-09-27 NOTE — TELEPHONE ENCOUNTER
"SPIRITUAL HEALTH SERVICES Phone Encounter Note  [facility] [place]    Reason for Contact: Pt Di Donaldson is followed at [facility] in [place]. This author was referred to contact Di because she recently scored positive on the oncology distress screen for potential spiritual distress. This was a follow-up call after the first call was cut off.    Intervention: Reviewed documentation.  I rang Di at her cell number, introduced myself and  Services.    Di said she thinks she's coping \"fairly well.\" She shared that she is beginning to feel better and is adapting to a \"new chapter\" I her life. 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 She said she turns on the radio or tv to keep her company. Di named eating in solitude as challenging.    Di said she has good neighbors, a bowling league, and a vegetable garden that keep her active and connected. She is also helping out a an upcoming [employer] bazaar, baking and set-up. Di said she is a member of [employer] [facility].    Plan: I offered reflective listening, affirmed experiences, invited Di to reach out to  as desired, and said a blessing.     remains available.    Rev Moni Urias  [facility] Phone Line [phone]  [place] ( & ) [phone]  "

## 2022-10-03 ENCOUNTER — HEALTH MAINTENANCE LETTER (OUTPATIENT)
Age: 77
End: 2022-10-03

## 2022-10-06 ENCOUNTER — HOSPITAL ENCOUNTER (OUTPATIENT)
Dept: CARDIOLOGY | Facility: CLINIC | Age: 77
Discharge: HOME | End: 2022-10-06
Attending: INTERNAL MEDICINE
Payer: COMMERCIAL

## 2022-10-06 ENCOUNTER — LAB (OUTPATIENT)
Dept: INFUSION THERAPY | Facility: CLINIC | Age: 77
End: 2022-10-06
Attending: INTERNAL MEDICINE
Payer: COMMERCIAL

## 2022-10-06 ENCOUNTER — ONCOLOGY VISIT (OUTPATIENT)
Dept: ONCOLOGY | Facility: CLINIC | Age: 77
End: 2022-10-06
Attending: INTERNAL MEDICINE
Payer: COMMERCIAL

## 2022-10-06 VITALS
WEIGHT: 216 LBS | RESPIRATION RATE: 18 BRPM | SYSTOLIC BLOOD PRESSURE: 134 MMHG | DIASTOLIC BLOOD PRESSURE: 64 MMHG | OXYGEN SATURATION: 99 % | HEART RATE: 91 BPM | TEMPERATURE: 97.5 F | BODY MASS INDEX: 33.9 KG/M2 | HEIGHT: 67 IN

## 2022-10-06 VITALS — SYSTOLIC BLOOD PRESSURE: 158 MMHG | DIASTOLIC BLOOD PRESSURE: 62 MMHG | HEART RATE: 71 BPM

## 2022-10-06 DIAGNOSIS — Z17.31 HER2-POSITIVE CARCINOMA OF LEFT BREAST (H): ICD-10-CM

## 2022-10-06 DIAGNOSIS — Z17.1 MALIGNANT NEOPLASM OF CENTRAL PORTION OF LEFT BREAST IN FEMALE, ESTROGEN RECEPTOR NEGATIVE (H): Primary | ICD-10-CM

## 2022-10-06 DIAGNOSIS — C50.412 MALIGNANT NEOPLASM OF UPPER-OUTER QUADRANT OF LEFT BREAST IN FEMALE, ESTROGEN RECEPTOR NEGATIVE (H): ICD-10-CM

## 2022-10-06 DIAGNOSIS — T45.1X5A CHEMOTHERAPY INDUCED CARDIOMYOPATHY (H): ICD-10-CM

## 2022-10-06 DIAGNOSIS — Z17.31 HER2-POSITIVE CARCINOMA OF LEFT BREAST (H): Primary | ICD-10-CM

## 2022-10-06 DIAGNOSIS — C50.912 HER2-POSITIVE CARCINOMA OF LEFT BREAST (H): ICD-10-CM

## 2022-10-06 DIAGNOSIS — Z17.1 MALIGNANT NEOPLASM OF UPPER-OUTER QUADRANT OF LEFT BREAST IN FEMALE, ESTROGEN RECEPTOR NEGATIVE (H): ICD-10-CM

## 2022-10-06 DIAGNOSIS — C50.112 MALIGNANT NEOPLASM OF CENTRAL PORTION OF LEFT BREAST IN FEMALE, ESTROGEN RECEPTOR NEGATIVE (H): ICD-10-CM

## 2022-10-06 DIAGNOSIS — Z17.1 MALIGNANT NEOPLASM OF CENTRAL PORTION OF LEFT BREAST IN FEMALE, ESTROGEN RECEPTOR NEGATIVE (H): ICD-10-CM

## 2022-10-06 DIAGNOSIS — C50.812 MALIGNANT NEOPLASM OF OVERLAPPING SITES OF LEFT BREAST IN FEMALE, ESTROGEN RECEPTOR NEGATIVE (H): ICD-10-CM

## 2022-10-06 DIAGNOSIS — T45.1X5A ANTINEOPLASTIC CHEMOTHERAPY INDUCED PANCYTOPENIA (H): ICD-10-CM

## 2022-10-06 DIAGNOSIS — G62.0 CHEMOTHERAPY-INDUCED NEUROPATHY (H): ICD-10-CM

## 2022-10-06 DIAGNOSIS — C50.112 MALIGNANT NEOPLASM OF CENTRAL PORTION OF LEFT BREAST IN FEMALE, ESTROGEN RECEPTOR NEGATIVE (H): Primary | ICD-10-CM

## 2022-10-06 DIAGNOSIS — C50.912 HER2-POSITIVE CARCINOMA OF LEFT BREAST (H): Primary | ICD-10-CM

## 2022-10-06 DIAGNOSIS — I42.7 CHEMOTHERAPY INDUCED CARDIOMYOPATHY (H): ICD-10-CM

## 2022-10-06 DIAGNOSIS — Z17.1 MALIGNANT NEOPLASM OF OVERLAPPING SITES OF LEFT BREAST IN FEMALE, ESTROGEN RECEPTOR NEGATIVE (H): ICD-10-CM

## 2022-10-06 DIAGNOSIS — D61.810 ANTINEOPLASTIC CHEMOTHERAPY INDUCED PANCYTOPENIA (H): ICD-10-CM

## 2022-10-06 DIAGNOSIS — T45.1X5A CHEMOTHERAPY-INDUCED NEUROPATHY (H): ICD-10-CM

## 2022-10-06 LAB
ALBUMIN SERPL-MCNC: 3.6 G/DL (ref 3.4–5)
ALP SERPL-CCNC: 87 U/L (ref 40–150)
ALT SERPL W P-5'-P-CCNC: 22 U/L (ref 0–50)
ANION GAP SERPL CALCULATED.3IONS-SCNC: 5 MMOL/L (ref 3–14)
AST SERPL W P-5'-P-CCNC: 16 U/L (ref 0–45)
BASOPHILS # BLD AUTO: 0.1 10E3/UL (ref 0–0.2)
BASOPHILS NFR BLD AUTO: 1 %
BILIRUB SERPL-MCNC: 0.4 MG/DL (ref 0.2–1.3)
BUN SERPL-MCNC: 23 MG/DL (ref 7–30)
CALCIUM SERPL-MCNC: 9.1 MG/DL (ref 8.5–10.1)
CHLORIDE BLD-SCNC: 108 MMOL/L (ref 94–109)
CO2 SERPL-SCNC: 27 MMOL/L (ref 20–32)
CREAT SERPL-MCNC: 1 MG/DL (ref 0.52–1.04)
EOSINOPHIL # BLD AUTO: 0.3 10E3/UL (ref 0–0.7)
EOSINOPHIL NFR BLD AUTO: 5 %
ERYTHROCYTE [DISTWIDTH] IN BLOOD BY AUTOMATED COUNT: 12.9 % (ref 10–15)
GFR SERPL CREATININE-BSD FRML MDRD: 58 ML/MIN/1.73M2
GLUCOSE BLD-MCNC: 135 MG/DL (ref 70–99)
HCT VFR BLD AUTO: 34 % (ref 35–47)
HGB BLD-MCNC: 11.5 G/DL (ref 11.7–15.7)
IMM GRANULOCYTES # BLD: 0 10E3/UL
IMM GRANULOCYTES NFR BLD: 0 %
LVEF ECHO: NORMAL
LYMPHOCYTES # BLD AUTO: 0.5 10E3/UL (ref 0.8–5.3)
LYMPHOCYTES NFR BLD AUTO: 8 %
MCH RBC QN AUTO: 31.6 PG (ref 26.5–33)
MCHC RBC AUTO-ENTMCNC: 33.8 G/DL (ref 31.5–36.5)
MCV RBC AUTO: 93 FL (ref 78–100)
MONOCYTES # BLD AUTO: 0.3 10E3/UL (ref 0–1.3)
MONOCYTES NFR BLD AUTO: 6 %
NEUTROPHILS # BLD AUTO: 4.8 10E3/UL (ref 1.6–8.3)
NEUTROPHILS NFR BLD AUTO: 80 %
NRBC # BLD AUTO: 0 10E3/UL
NRBC BLD AUTO-RTO: 0 /100
PLATELET # BLD AUTO: 174 10E3/UL (ref 150–450)
POTASSIUM BLD-SCNC: 4.1 MMOL/L (ref 3.4–5.3)
PROT SERPL-MCNC: 6.8 G/DL (ref 6.8–8.8)
RBC # BLD AUTO: 3.64 10E6/UL (ref 3.8–5.2)
SODIUM SERPL-SCNC: 140 MMOL/L (ref 133–144)
WBC # BLD AUTO: 6 10E3/UL (ref 4–11)

## 2022-10-06 PROCEDURE — 250N000011 HC RX IP 250 OP 636: Performed by: INTERNAL MEDICINE

## 2022-10-06 PROCEDURE — 96413 CHEMO IV INFUSION 1 HR: CPT

## 2022-10-06 PROCEDURE — 93325 DOPPLER ECHO COLOR FLOW MAPG: CPT

## 2022-10-06 PROCEDURE — 93325 DOPPLER ECHO COLOR FLOW MAPG: CPT | Mod: 26 | Performed by: INTERNAL MEDICINE

## 2022-10-06 PROCEDURE — 36591 DRAW BLOOD OFF VENOUS DEVICE: CPT

## 2022-10-06 PROCEDURE — 93308 TTE F-UP OR LMTD: CPT

## 2022-10-06 PROCEDURE — 258N000003 HC RX IP 258 OP 636: Performed by: INTERNAL MEDICINE

## 2022-10-06 PROCEDURE — 93321 DOPPLER ECHO F-UP/LMTD STD: CPT | Mod: 26 | Performed by: INTERNAL MEDICINE

## 2022-10-06 PROCEDURE — 80053 COMPREHEN METABOLIC PANEL: CPT

## 2022-10-06 PROCEDURE — 99214 OFFICE O/P EST MOD 30 MIN: CPT | Performed by: INTERNAL MEDICINE

## 2022-10-06 PROCEDURE — 93308 TTE F-UP OR LMTD: CPT | Mod: 26 | Performed by: INTERNAL MEDICINE

## 2022-10-06 PROCEDURE — 85004 AUTOMATED DIFF WBC COUNT: CPT

## 2022-10-06 PROCEDURE — 82040 ASSAY OF SERUM ALBUMIN: CPT

## 2022-10-06 RX ORDER — TRAZODONE HYDROCHLORIDE 50 MG/1
50 TABLET, FILM COATED ORAL
COMMUNITY
Start: 2022-09-26

## 2022-10-06 RX ORDER — HEPARIN SODIUM (PORCINE) LOCK FLUSH IV SOLN 100 UNIT/ML 100 UNIT/ML
5 SOLUTION INTRAVENOUS
Status: DISCONTINUED | OUTPATIENT
Start: 2022-10-06 | End: 2022-10-06 | Stop reason: HOSPADM

## 2022-10-06 RX ORDER — HEPARIN SODIUM (PORCINE) LOCK FLUSH IV SOLN 100 UNIT/ML 100 UNIT/ML
5 SOLUTION INTRAVENOUS
Status: CANCELLED | OUTPATIENT
Start: 2022-10-06

## 2022-10-06 RX ADMIN — SODIUM CHLORIDE 250 ML: 9 INJECTION, SOLUTION INTRAVENOUS at 11:21

## 2022-10-06 RX ADMIN — Medication 5 ML: at 12:28

## 2022-10-06 RX ADMIN — SODIUM CHLORIDE 570 MG: 9 INJECTION, SOLUTION INTRAVENOUS at 11:40

## 2022-10-06 ASSESSMENT — PAIN SCALES - GENERAL: PAINLEVEL: MODERATE PAIN (4)

## 2022-10-06 NOTE — Clinical Note
10/6/2022         RE: Di Donaldson  [address] [place] [address]        Dear Colleague,    Thank you for referring your patient, Di Donaldson, to the [facility] CANCER [facility]. Please see a copy of my visit note below.    [facility] Hematology / Oncology  Progress Note  Name: Di Donaldson  :  1945    MRN:  [id number]    --------------------    Assessment / Plan:  Early-stage, left-sided, hormone negative, HER-2 positive breast cancer:    Aug - Sep 2022.  Left breast 4256 cGy over 16 fractions.  Left breast boost 1000 cGY over 4 fractions.        Di, her son Fredo and I reviewed her pathology report in detail.  She had an excellent response to neoadjuvant therapy with only a microscopic amount of residual HER2 positive breast cancer leftover at the time of surgery and most importantly negative lymph nodes.  From an adjuvant standpoint, I would recommend adjuvant radiotherapy which logistically would be closest to [place] for her.  Given travel time and travel distance, we will be hopeful that some course of hypofractionated breast radiotherapy could be considered potentially over 5 sessions, but ultimately this is up to the discretion of her treating radiation oncologist.  From an adjuvant systemic therapy standpoint, we agreed to Herceptin for additional of 9 months delivered intravenously every 3 weeks; we're following the [place] protocol for early stage, HER2 positive breast cancer.  We will need to do some periodic cardiac monitoring.  One could consider switching to an alternative agent such as Kadcyla given the microscopic disease at the time of surgery, but given her pretty bad grade 2 to grade 3 neuropathy right now I have concerns that adjuvant Kadcyla could aggravate this even further.  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   "She is in agreement with this plan.    Osorio Meyer MD    --------------------    Interval History:  Di returns for follow up of breast cancer.  All in all, she is doing quite well.  She is healing up from surgery well and without complications.  No breast concerns.  No lymphedema concerns.  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  Is affecting her feet with some slight balance problems.  She is definitely clumsy or using her hands.    --------------------    Physical Exam:  VS: /64 (BP Location: Right arm, Patient Position: Sitting, Cuff Size: Adult Regular)   Pulse 91   Temp 97.5  F (36.4  C) (Temporal)   Resp 18   Ht 1.702 m (5' 7\")   Wt 98 kg (216 lb)   SpO2 99%   BMI 33.83 kg/m    Gen: Well-appearing.    Labs / Imaging / Path:  We reviewed her pathology report in detail.       Again, thank you for allowing me to participate in the care of your patient.        Sincerely,        Osorio Meyer MD  "

## 2022-10-06 NOTE — PROGRESS NOTES
"[facility] Hematology / Oncology  Progress Note  Name: Di Donaldson  :  1945    MRN:  [id number]    --------------------    Assessment / Plan:  Early-stage, left-sided, hormone negative, HER-2 positive breast cancer:    Clinically, Di remains well.  She continues with adjuvant therapy with Herceptin.  She had an excellent response to neoadjuvant therapy with only a microscopic amount of residual HER2 positive breast cancer.  She has completed adjuvant radiotherapy.  We have plans for 9 months of adjuvant Herceptin every 3 weeks based upon clinic protocol.  We had discussions about adjuvant Kadcyla but given pretty bad neuropathy following chemotherapy as well as the small early-stage cancer and microscopic disease at the time of surgery, I do wonder whether the potential small benefit would be outweighed by potential side effects.  She will continue on gabapentin 600 mg 3 times daily for neuropathy.  Her blood counts are improving with time.  We will need some periodic cardiac monitoring as well.  Return to clinic 3 months.    Osorio Meyer MD    --------------------    Interval History:  Di returns for follow up of breast cancer.  All in all, she is doing quite well.  She is healing up from surgery well and without complications.  No breast concerns.  No lymphedema concerns.  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  Is affecting her feet with some slight balance problems.  She remains on gabapentin    --------------------    Physical Exam:  VS: /64 (BP Location: Right arm, Patient Position: Sitting, Cuff Size: Adult Regular)   Pulse 91   Temp 97.5  F (36.4  C) (Temporal)   Resp 18   Ht 1.702 m (5' 7\")   Wt 98 kg (216 lb)   SpO2 99%   BMI 33.83 kg/m    Gen: Well-appearing.    Labs / Imaging / Path:  We reviewed CBC, CMP, ECHO and pathology report.  "

## 2022-10-06 NOTE — PROGRESS NOTES
Infusion Nursing Note:  Di Donaldson presents today for Trazimera.    Patient seen by provider today: Yes: Dr Meyer   present during visit today: Not Applicable.    Note: Pt accompanied to Cardiology post treatment for echo today.    Intravenous Access:  Labs drawn without difficulty.  Implanted Port.    Treatment Conditions:  Lab Results   Component Value Date    HGB 11.5 (L) 10/06/2022    WBC 6.0 10/06/2022    ANEUTAUTO 4.8 10/06/2022     10/06/2022      Lab Results   Component Value Date     10/06/2022    POTASSIUM 4.1 10/06/2022    CR 1.00 10/06/2022    GINNY 9.1 10/06/2022    BILITOTAL 0.4 10/06/2022    ALBUMIN 3.6 10/06/2022    ALT 22 10/06/2022    AST 16 10/06/2022       Post Infusion Assessment:  Patient tolerated infusion without incident.  Patient observed for 15 minutes post Trazimera.  Blood return noted pre and post infusion.  Site patent and intact, free from redness, edema or discomfort.  No evidence of extravasations.  Access discontinued per protocol.     Discharge Plan:   Discharge instructions reviewed with: Patient.  Patient discharged in stable condition accompanied by: self and friend.  Departure Mode: Ambulatory.      Jayda Palomares RN

## 2022-10-24 DIAGNOSIS — C50.112 MALIGNANT NEOPLASM OF CENTRAL PORTION OF LEFT BREAST IN FEMALE, ESTROGEN RECEPTOR NEGATIVE (H): Primary | ICD-10-CM

## 2022-10-24 DIAGNOSIS — C50.412 MALIGNANT NEOPLASM OF UPPER-OUTER QUADRANT OF LEFT BREAST IN FEMALE, ESTROGEN RECEPTOR NEGATIVE (H): ICD-10-CM

## 2022-10-24 DIAGNOSIS — Z17.1 MALIGNANT NEOPLASM OF CENTRAL PORTION OF LEFT BREAST IN FEMALE, ESTROGEN RECEPTOR NEGATIVE (H): Primary | ICD-10-CM

## 2022-10-24 DIAGNOSIS — Z17.1 MALIGNANT NEOPLASM OF UPPER-OUTER QUADRANT OF LEFT BREAST IN FEMALE, ESTROGEN RECEPTOR NEGATIVE (H): ICD-10-CM

## 2022-10-24 RX ORDER — HEPARIN SODIUM (PORCINE) LOCK FLUSH IV SOLN 100 UNIT/ML 100 UNIT/ML
5 SOLUTION INTRAVENOUS
Status: CANCELLED | OUTPATIENT
Start: 2022-10-27

## 2022-10-24 RX ORDER — NALOXONE HYDROCHLORIDE 0.4 MG/ML
0.2 INJECTION, SOLUTION INTRAMUSCULAR; INTRAVENOUS; SUBCUTANEOUS
Status: CANCELLED | OUTPATIENT
Start: 2022-12-08

## 2022-10-24 RX ORDER — HEPARIN SODIUM (PORCINE) LOCK FLUSH IV SOLN 100 UNIT/ML 100 UNIT/ML
5 SOLUTION INTRAVENOUS
Status: CANCELLED | OUTPATIENT
Start: 2022-11-17

## 2022-10-24 RX ORDER — ACETAMINOPHEN 325 MG/1
650 TABLET ORAL
Status: CANCELLED
Start: 2022-10-27

## 2022-10-24 RX ORDER — ALBUTEROL SULFATE 0.83 MG/ML
2.5 SOLUTION RESPIRATORY (INHALATION)
Status: CANCELLED | OUTPATIENT
Start: 2022-11-17

## 2022-10-24 RX ORDER — MEPERIDINE HYDROCHLORIDE 25 MG/ML
25 INJECTION INTRAMUSCULAR; INTRAVENOUS; SUBCUTANEOUS EVERY 30 MIN PRN
Status: CANCELLED | OUTPATIENT
Start: 2022-12-08

## 2022-10-24 RX ORDER — ALBUTEROL SULFATE 90 UG/1
1-2 AEROSOL, METERED RESPIRATORY (INHALATION)
Status: CANCELLED
Start: 2022-11-17

## 2022-10-24 RX ORDER — ALBUTEROL SULFATE 90 UG/1
1-2 AEROSOL, METERED RESPIRATORY (INHALATION)
Status: CANCELLED
Start: 2022-12-08

## 2022-10-24 RX ORDER — DIPHENHYDRAMINE HYDROCHLORIDE 50 MG/ML
50 INJECTION INTRAMUSCULAR; INTRAVENOUS
Status: CANCELLED
Start: 2022-11-17

## 2022-10-24 RX ORDER — DIPHENHYDRAMINE HYDROCHLORIDE 50 MG/ML
50 INJECTION INTRAMUSCULAR; INTRAVENOUS
Status: CANCELLED
Start: 2022-10-27

## 2022-10-24 RX ORDER — METHYLPREDNISOLONE SODIUM SUCCINATE 125 MG/2ML
125 INJECTION, POWDER, LYOPHILIZED, FOR SOLUTION INTRAMUSCULAR; INTRAVENOUS
Status: CANCELLED
Start: 2022-10-27

## 2022-10-24 RX ORDER — MEPERIDINE HYDROCHLORIDE 25 MG/ML
25 INJECTION INTRAMUSCULAR; INTRAVENOUS; SUBCUTANEOUS EVERY 30 MIN PRN
Status: CANCELLED | OUTPATIENT
Start: 2022-11-17

## 2022-10-24 RX ORDER — HEPARIN SODIUM (PORCINE) LOCK FLUSH IV SOLN 100 UNIT/ML 100 UNIT/ML
5 SOLUTION INTRAVENOUS
Status: CANCELLED | OUTPATIENT
Start: 2022-12-08

## 2022-10-24 RX ORDER — HEPARIN SODIUM,PORCINE 10 UNIT/ML
5 VIAL (ML) INTRAVENOUS
Status: CANCELLED | OUTPATIENT
Start: 2022-10-27

## 2022-10-24 RX ORDER — ALBUTEROL SULFATE 90 UG/1
1-2 AEROSOL, METERED RESPIRATORY (INHALATION)
Status: CANCELLED
Start: 2022-10-27

## 2022-10-24 RX ORDER — NALOXONE HYDROCHLORIDE 0.4 MG/ML
0.2 INJECTION, SOLUTION INTRAMUSCULAR; INTRAVENOUS; SUBCUTANEOUS
Status: CANCELLED | OUTPATIENT
Start: 2022-10-27

## 2022-10-24 RX ORDER — HEPARIN SODIUM,PORCINE 10 UNIT/ML
5 VIAL (ML) INTRAVENOUS
Status: CANCELLED | OUTPATIENT
Start: 2022-12-08

## 2022-10-24 RX ORDER — DIPHENHYDRAMINE HYDROCHLORIDE 50 MG/ML
50 INJECTION INTRAMUSCULAR; INTRAVENOUS
Status: CANCELLED
Start: 2022-12-08

## 2022-10-24 RX ORDER — EPINEPHRINE 1 MG/ML
0.3 INJECTION, SOLUTION INTRAMUSCULAR; SUBCUTANEOUS EVERY 5 MIN PRN
Status: CANCELLED | OUTPATIENT
Start: 2022-10-27

## 2022-10-24 RX ORDER — EPINEPHRINE 1 MG/ML
0.3 INJECTION, SOLUTION INTRAMUSCULAR; SUBCUTANEOUS EVERY 5 MIN PRN
Status: CANCELLED | OUTPATIENT
Start: 2022-12-08

## 2022-10-24 RX ORDER — HEPARIN SODIUM,PORCINE 10 UNIT/ML
5 VIAL (ML) INTRAVENOUS
Status: CANCELLED | OUTPATIENT
Start: 2022-11-17

## 2022-10-24 RX ORDER — EPINEPHRINE 1 MG/ML
0.3 INJECTION, SOLUTION INTRAMUSCULAR; SUBCUTANEOUS EVERY 5 MIN PRN
Status: CANCELLED | OUTPATIENT
Start: 2022-11-17

## 2022-10-24 RX ORDER — ALBUTEROL SULFATE 0.83 MG/ML
2.5 SOLUTION RESPIRATORY (INHALATION)
Status: CANCELLED | OUTPATIENT
Start: 2022-12-08

## 2022-10-24 RX ORDER — MEPERIDINE HYDROCHLORIDE 25 MG/ML
25 INJECTION INTRAMUSCULAR; INTRAVENOUS; SUBCUTANEOUS EVERY 30 MIN PRN
Status: CANCELLED | OUTPATIENT
Start: 2022-10-27

## 2022-10-24 RX ORDER — DIPHENHYDRAMINE HCL 25 MG
50 CAPSULE ORAL
Status: CANCELLED
Start: 2022-10-27

## 2022-10-24 RX ORDER — ALBUTEROL SULFATE 0.83 MG/ML
2.5 SOLUTION RESPIRATORY (INHALATION)
Status: CANCELLED | OUTPATIENT
Start: 2022-10-27

## 2022-10-24 RX ORDER — ACETAMINOPHEN 325 MG/1
650 TABLET ORAL
Status: CANCELLED
Start: 2022-11-17

## 2022-10-24 RX ORDER — METHYLPREDNISOLONE SODIUM SUCCINATE 125 MG/2ML
125 INJECTION, POWDER, LYOPHILIZED, FOR SOLUTION INTRAMUSCULAR; INTRAVENOUS
Status: CANCELLED
Start: 2022-11-17

## 2022-10-24 RX ORDER — METHYLPREDNISOLONE SODIUM SUCCINATE 125 MG/2ML
125 INJECTION, POWDER, LYOPHILIZED, FOR SOLUTION INTRAMUSCULAR; INTRAVENOUS
Status: CANCELLED
Start: 2022-12-08

## 2022-10-24 RX ORDER — DIPHENHYDRAMINE HCL 25 MG
50 CAPSULE ORAL
Status: CANCELLED
Start: 2022-11-17

## 2022-10-24 RX ORDER — NALOXONE HYDROCHLORIDE 0.4 MG/ML
0.2 INJECTION, SOLUTION INTRAMUSCULAR; INTRAVENOUS; SUBCUTANEOUS
Status: CANCELLED | OUTPATIENT
Start: 2022-11-17

## 2022-10-24 RX ORDER — ACETAMINOPHEN 325 MG/1
650 TABLET ORAL
Status: CANCELLED
Start: 2022-12-08

## 2022-10-24 RX ORDER — DIPHENHYDRAMINE HCL 25 MG
50 CAPSULE ORAL
Status: CANCELLED
Start: 2022-12-08

## 2022-10-27 ENCOUNTER — INFUSION THERAPY VISIT (OUTPATIENT)
Dept: INFUSION THERAPY | Facility: CLINIC | Age: 77
End: 2022-10-27
Attending: INTERNAL MEDICINE
Payer: COMMERCIAL

## 2022-10-27 VITALS
HEIGHT: 67 IN | SYSTOLIC BLOOD PRESSURE: 154 MMHG | TEMPERATURE: 97.6 F | BODY MASS INDEX: 33.7 KG/M2 | OXYGEN SATURATION: 96 % | DIASTOLIC BLOOD PRESSURE: 65 MMHG | RESPIRATION RATE: 18 BRPM | WEIGHT: 214.7 LBS | HEART RATE: 68 BPM

## 2022-10-27 DIAGNOSIS — Z17.1 MALIGNANT NEOPLASM OF OVERLAPPING SITES OF LEFT BREAST IN FEMALE, ESTROGEN RECEPTOR NEGATIVE (H): ICD-10-CM

## 2022-10-27 DIAGNOSIS — C50.812 MALIGNANT NEOPLASM OF OVERLAPPING SITES OF LEFT BREAST IN FEMALE, ESTROGEN RECEPTOR NEGATIVE (H): ICD-10-CM

## 2022-10-27 DIAGNOSIS — Z17.31 HER2-POSITIVE CARCINOMA OF LEFT BREAST (H): ICD-10-CM

## 2022-10-27 DIAGNOSIS — C50.912 HER2-POSITIVE CARCINOMA OF LEFT BREAST (H): ICD-10-CM

## 2022-10-27 DIAGNOSIS — C50.112 MALIGNANT NEOPLASM OF CENTRAL PORTION OF LEFT BREAST IN FEMALE, ESTROGEN RECEPTOR NEGATIVE (H): ICD-10-CM

## 2022-10-27 DIAGNOSIS — C50.412 MALIGNANT NEOPLASM OF UPPER-OUTER QUADRANT OF LEFT BREAST IN FEMALE, ESTROGEN RECEPTOR NEGATIVE (H): Primary | ICD-10-CM

## 2022-10-27 DIAGNOSIS — Z17.1 MALIGNANT NEOPLASM OF CENTRAL PORTION OF LEFT BREAST IN FEMALE, ESTROGEN RECEPTOR NEGATIVE (H): ICD-10-CM

## 2022-10-27 DIAGNOSIS — Z17.1 MALIGNANT NEOPLASM OF UPPER-OUTER QUADRANT OF LEFT BREAST IN FEMALE, ESTROGEN RECEPTOR NEGATIVE (H): Primary | ICD-10-CM

## 2022-10-27 PROCEDURE — 96413 CHEMO IV INFUSION 1 HR: CPT

## 2022-10-27 PROCEDURE — 258N000003 HC RX IP 258 OP 636: Performed by: INTERNAL MEDICINE

## 2022-10-27 PROCEDURE — 250N000011 HC RX IP 250 OP 636: Performed by: INTERNAL MEDICINE

## 2022-10-27 RX ORDER — HEPARIN SODIUM (PORCINE) LOCK FLUSH IV SOLN 100 UNIT/ML 100 UNIT/ML
5 SOLUTION INTRAVENOUS
Status: DISCONTINUED | OUTPATIENT
Start: 2022-10-27 | End: 2022-10-27 | Stop reason: HOSPADM

## 2022-10-27 RX ORDER — HEPARIN SODIUM (PORCINE) LOCK FLUSH IV SOLN 100 UNIT/ML 100 UNIT/ML
5 SOLUTION INTRAVENOUS
Status: CANCELLED | OUTPATIENT
Start: 2022-10-27

## 2022-10-27 RX ADMIN — Medication 5 ML: at 14:53

## 2022-10-27 RX ADMIN — SODIUM CHLORIDE 570 MG: 9 INJECTION, SOLUTION INTRAVENOUS at 14:02

## 2022-10-27 RX ADMIN — SODIUM CHLORIDE 250 ML: 9 INJECTION, SOLUTION INTRAVENOUS at 13:31

## 2022-10-27 ASSESSMENT — PAIN SCALES - GENERAL: PAINLEVEL: NO PAIN (0)

## 2022-10-27 NOTE — PROGRESS NOTES
Infusion Nursing Note:  Di AYANA Donaldson presents today for C6D1 Trazimera.    Patient seen by provider today: No   present during visit today: Not Applicable.    Note: No complaints. Fatigued, but had very busy last week. .    Intravenous Access:  Implanted Port.    Treatment Conditions:  Not Applicable.    Post Infusion Assessment:  Patient tolerated infusion without incident.  Site patent and intact, free from redness, edema or discomfort.  Access discontinued per protocol.     Discharge Plan:   Discharge instructions reviewed with: Patient.  Patient discharged in stable condition accompanied by: self and friend.  Departure Mode: Ambulatory.      Sharmin Colbert RN

## 2022-11-17 ENCOUNTER — INFUSION THERAPY VISIT (OUTPATIENT)
Dept: INFUSION THERAPY | Facility: CLINIC | Age: 77
End: 2022-11-17
Attending: INTERNAL MEDICINE
Payer: COMMERCIAL

## 2022-11-17 VITALS
RESPIRATION RATE: 16 BRPM | HEART RATE: 70 BPM | OXYGEN SATURATION: 98 % | WEIGHT: 215.8 LBS | BODY MASS INDEX: 33.8 KG/M2 | DIASTOLIC BLOOD PRESSURE: 58 MMHG | TEMPERATURE: 97.1 F | SYSTOLIC BLOOD PRESSURE: 141 MMHG

## 2022-11-17 DIAGNOSIS — Z17.1 MALIGNANT NEOPLASM OF CENTRAL PORTION OF LEFT BREAST IN FEMALE, ESTROGEN RECEPTOR NEGATIVE (H): ICD-10-CM

## 2022-11-17 DIAGNOSIS — Z17.1 MALIGNANT NEOPLASM OF UPPER-OUTER QUADRANT OF LEFT BREAST IN FEMALE, ESTROGEN RECEPTOR NEGATIVE (H): Primary | ICD-10-CM

## 2022-11-17 DIAGNOSIS — C50.112 MALIGNANT NEOPLASM OF CENTRAL PORTION OF LEFT BREAST IN FEMALE, ESTROGEN RECEPTOR NEGATIVE (H): ICD-10-CM

## 2022-11-17 DIAGNOSIS — C50.412 MALIGNANT NEOPLASM OF UPPER-OUTER QUADRANT OF LEFT BREAST IN FEMALE, ESTROGEN RECEPTOR NEGATIVE (H): Primary | ICD-10-CM

## 2022-11-17 PROCEDURE — 258N000003 HC RX IP 258 OP 636: Performed by: INTERNAL MEDICINE

## 2022-11-17 PROCEDURE — 96413 CHEMO IV INFUSION 1 HR: CPT

## 2022-11-17 PROCEDURE — 250N000011 HC RX IP 250 OP 636: Performed by: INTERNAL MEDICINE

## 2022-11-17 RX ORDER — HEPARIN SODIUM (PORCINE) LOCK FLUSH IV SOLN 100 UNIT/ML 100 UNIT/ML
5 SOLUTION INTRAVENOUS
Status: DISCONTINUED | OUTPATIENT
Start: 2022-11-17 | End: 2022-11-17 | Stop reason: HOSPADM

## 2022-11-17 RX ADMIN — SODIUM CHLORIDE 250 ML: 9 INJECTION, SOLUTION INTRAVENOUS at 13:00

## 2022-11-17 RX ADMIN — Medication 5 ML: at 14:14

## 2022-11-17 ASSESSMENT — PAIN SCALES - GENERAL
PAINLEVEL: NO PAIN (0)
PAINLEVEL: NO PAIN (0)

## 2022-11-17 NOTE — PROGRESS NOTES
Infusion Nursing Note:  Di Donaldson presents today for trastuzumab-qyyp (TRAZIMERA).    Patient seen by provider today: No   present during visit today: Not Applicable.    Note: Tolerated infusion, reports feeling more fatigued today.  Driving to [facility] after appointment to celebrate Thanksgiving with family.     Intravenous Access:  Implanted Port.    Treatment Conditions:  Not Applicable.    Post Infusion Assessment:  Patient tolerated infusion without incident.  Patient observed for 15 minutes post Trazimera per protocol.  Blood return noted pre and post infusion.  Site patent and intact, free from redness, edema or discomfort.  No evidence of extravasations.  Access discontinued per protocol.     Discharge Plan:   Discharge instructions reviewed with: Patient.  Patient and/or family verbalized understanding of discharge instructions and all questions answered.  AVS to patient via [employer]  Patient will return 12/8 for next appointment.   Patient discharged in stable condition accompanied by: self.  Departure Mode: Ambulatory.      Idalmis Mario RN

## 2022-12-08 ENCOUNTER — INFUSION THERAPY VISIT (OUTPATIENT)
Dept: INFUSION THERAPY | Facility: CLINIC | Age: 77
End: 2022-12-08
Attending: INTERNAL MEDICINE
Payer: COMMERCIAL

## 2022-12-08 VITALS
RESPIRATION RATE: 18 BRPM | BODY MASS INDEX: 34.16 KG/M2 | TEMPERATURE: 97.7 F | SYSTOLIC BLOOD PRESSURE: 168 MMHG | DIASTOLIC BLOOD PRESSURE: 68 MMHG | HEART RATE: 72 BPM | OXYGEN SATURATION: 99 % | WEIGHT: 218.1 LBS

## 2022-12-08 DIAGNOSIS — C50.112 MALIGNANT NEOPLASM OF CENTRAL PORTION OF LEFT BREAST IN FEMALE, ESTROGEN RECEPTOR NEGATIVE (H): ICD-10-CM

## 2022-12-08 DIAGNOSIS — Z17.1 MALIGNANT NEOPLASM OF CENTRAL PORTION OF LEFT BREAST IN FEMALE, ESTROGEN RECEPTOR NEGATIVE (H): ICD-10-CM

## 2022-12-08 DIAGNOSIS — C50.412 MALIGNANT NEOPLASM OF UPPER-OUTER QUADRANT OF LEFT BREAST IN FEMALE, ESTROGEN RECEPTOR NEGATIVE (H): Primary | ICD-10-CM

## 2022-12-08 DIAGNOSIS — Z17.1 MALIGNANT NEOPLASM OF UPPER-OUTER QUADRANT OF LEFT BREAST IN FEMALE, ESTROGEN RECEPTOR NEGATIVE (H): Primary | ICD-10-CM

## 2022-12-08 PROCEDURE — 250N000011 HC RX IP 250 OP 636: Performed by: INTERNAL MEDICINE

## 2022-12-08 PROCEDURE — 258N000003 HC RX IP 258 OP 636: Performed by: INTERNAL MEDICINE

## 2022-12-08 PROCEDURE — 96413 CHEMO IV INFUSION 1 HR: CPT

## 2022-12-08 RX ORDER — HEPARIN SODIUM (PORCINE) LOCK FLUSH IV SOLN 100 UNIT/ML 100 UNIT/ML
5 SOLUTION INTRAVENOUS
Status: DISCONTINUED | OUTPATIENT
Start: 2022-12-08 | End: 2022-12-08 | Stop reason: HOSPADM

## 2022-12-08 RX ADMIN — SODIUM CHLORIDE 570 MG: 9 INJECTION, SOLUTION INTRAVENOUS at 13:44

## 2022-12-08 RX ADMIN — Medication 5 ML: at 14:35

## 2022-12-08 RX ADMIN — SODIUM CHLORIDE 250 ML: 9 INJECTION, SOLUTION INTRAVENOUS at 13:15

## 2022-12-08 ASSESSMENT — PAIN SCALES - GENERAL: PAINLEVEL: NO PAIN (0)

## 2022-12-08 NOTE — PROGRESS NOTES
Infusion Nursing Note:  Di Donaldson presents today for C8 Trazimera.    Patient seen by provider today: No   present during visit today: Not Applicable.    Note: N/A.    Intravenous Access:  Implanted Port.    Treatment Conditions:  Not Applicable.    Post Infusion Assessment:  Patient tolerated infusion without incident.  Patient observed for 15 minutes post Trazimera.  Blood return noted pre and post infusion.  Site patent and intact, free from redness, edema or discomfort.  No evidence of extravasations.  Access discontinued per protocol.     Discharge Plan:   Discharge instructions reviewed with: Patient.  Patient discharged in stable condition accompanied by: self.  Departure Mode: Ambulatory.      Jayda Palomares RN

## 2022-12-22 DIAGNOSIS — C50.412 MALIGNANT NEOPLASM OF UPPER-OUTER QUADRANT OF LEFT BREAST IN FEMALE, ESTROGEN RECEPTOR NEGATIVE (H): ICD-10-CM

## 2022-12-22 DIAGNOSIS — Z17.1 MALIGNANT NEOPLASM OF UPPER-OUTER QUADRANT OF LEFT BREAST IN FEMALE, ESTROGEN RECEPTOR NEGATIVE (H): ICD-10-CM

## 2022-12-22 DIAGNOSIS — C50.112 MALIGNANT NEOPLASM OF CENTRAL PORTION OF LEFT BREAST IN FEMALE, ESTROGEN RECEPTOR NEGATIVE (H): Primary | ICD-10-CM

## 2022-12-22 DIAGNOSIS — Z17.1 MALIGNANT NEOPLASM OF CENTRAL PORTION OF LEFT BREAST IN FEMALE, ESTROGEN RECEPTOR NEGATIVE (H): Primary | ICD-10-CM

## 2022-12-22 RX ORDER — DIPHENHYDRAMINE HYDROCHLORIDE 50 MG/ML
50 INJECTION INTRAMUSCULAR; INTRAVENOUS
Status: CANCELLED
Start: 2023-04-13

## 2022-12-22 RX ORDER — HEPARIN SODIUM (PORCINE) LOCK FLUSH IV SOLN 100 UNIT/ML 100 UNIT/ML
5 SOLUTION INTRAVENOUS
Status: CANCELLED | OUTPATIENT
Start: 2023-02-09

## 2022-12-22 RX ORDER — NALOXONE HYDROCHLORIDE 0.4 MG/ML
0.2 INJECTION, SOLUTION INTRAMUSCULAR; INTRAVENOUS; SUBCUTANEOUS
Status: CANCELLED | OUTPATIENT
Start: 2023-02-09

## 2022-12-22 RX ORDER — DIPHENHYDRAMINE HCL 25 MG
50 CAPSULE ORAL
Status: CANCELLED
Start: 2023-04-13

## 2022-12-22 RX ORDER — MEPERIDINE HYDROCHLORIDE 25 MG/ML
25 INJECTION INTRAMUSCULAR; INTRAVENOUS; SUBCUTANEOUS EVERY 30 MIN PRN
Status: CANCELLED | OUTPATIENT
Start: 2023-03-02

## 2022-12-22 RX ORDER — HEPARIN SODIUM (PORCINE) LOCK FLUSH IV SOLN 100 UNIT/ML 100 UNIT/ML
5 SOLUTION INTRAVENOUS
Status: CANCELLED | OUTPATIENT
Start: 2023-03-23

## 2022-12-22 RX ORDER — NALOXONE HYDROCHLORIDE 0.4 MG/ML
0.2 INJECTION, SOLUTION INTRAMUSCULAR; INTRAVENOUS; SUBCUTANEOUS
Status: CANCELLED | OUTPATIENT
Start: 2023-04-13

## 2022-12-22 RX ORDER — NALOXONE HYDROCHLORIDE 0.4 MG/ML
0.2 INJECTION, SOLUTION INTRAMUSCULAR; INTRAVENOUS; SUBCUTANEOUS
Status: CANCELLED | OUTPATIENT
Start: 2023-03-23

## 2022-12-22 RX ORDER — DIPHENHYDRAMINE HCL 25 MG
50 CAPSULE ORAL
Status: CANCELLED
Start: 2023-05-25

## 2022-12-22 RX ORDER — HEPARIN SODIUM (PORCINE) LOCK FLUSH IV SOLN 100 UNIT/ML 100 UNIT/ML
5 SOLUTION INTRAVENOUS
Status: CANCELLED | OUTPATIENT
Start: 2022-12-29

## 2022-12-22 RX ORDER — HEPARIN SODIUM,PORCINE 10 UNIT/ML
5 VIAL (ML) INTRAVENOUS
Status: CANCELLED | OUTPATIENT
Start: 2023-01-19

## 2022-12-22 RX ORDER — HEPARIN SODIUM (PORCINE) LOCK FLUSH IV SOLN 100 UNIT/ML 100 UNIT/ML
5 SOLUTION INTRAVENOUS
Status: CANCELLED | OUTPATIENT
Start: 2023-03-02

## 2022-12-22 RX ORDER — ACETAMINOPHEN 325 MG/1
650 TABLET ORAL
Status: CANCELLED
Start: 2023-05-25

## 2022-12-22 RX ORDER — ALBUTEROL SULFATE 0.83 MG/ML
2.5 SOLUTION RESPIRATORY (INHALATION)
Status: CANCELLED | OUTPATIENT
Start: 2023-03-02

## 2022-12-22 RX ORDER — HEPARIN SODIUM (PORCINE) LOCK FLUSH IV SOLN 100 UNIT/ML 100 UNIT/ML
5 SOLUTION INTRAVENOUS
Status: CANCELLED | OUTPATIENT
Start: 2023-05-04

## 2022-12-22 RX ORDER — DIPHENHYDRAMINE HYDROCHLORIDE 50 MG/ML
50 INJECTION INTRAMUSCULAR; INTRAVENOUS
Status: CANCELLED
Start: 2022-12-29

## 2022-12-22 RX ORDER — ACETAMINOPHEN 325 MG/1
650 TABLET ORAL
Status: CANCELLED
Start: 2022-12-29

## 2022-12-22 RX ORDER — ALBUTEROL SULFATE 0.83 MG/ML
2.5 SOLUTION RESPIRATORY (INHALATION)
Status: CANCELLED | OUTPATIENT
Start: 2023-02-09

## 2022-12-22 RX ORDER — EPINEPHRINE 1 MG/ML
0.3 INJECTION, SOLUTION INTRAMUSCULAR; SUBCUTANEOUS EVERY 5 MIN PRN
Status: CANCELLED | OUTPATIENT
Start: 2023-04-13

## 2022-12-22 RX ORDER — METHYLPREDNISOLONE SODIUM SUCCINATE 125 MG/2ML
125 INJECTION, POWDER, LYOPHILIZED, FOR SOLUTION INTRAMUSCULAR; INTRAVENOUS
Status: CANCELLED
Start: 2023-01-19

## 2022-12-22 RX ORDER — MEPERIDINE HYDROCHLORIDE 25 MG/ML
25 INJECTION INTRAMUSCULAR; INTRAVENOUS; SUBCUTANEOUS EVERY 30 MIN PRN
Status: CANCELLED | OUTPATIENT
Start: 2023-05-04

## 2022-12-22 RX ORDER — EPINEPHRINE 1 MG/ML
0.3 INJECTION, SOLUTION INTRAMUSCULAR; SUBCUTANEOUS EVERY 5 MIN PRN
Status: CANCELLED | OUTPATIENT
Start: 2023-05-04

## 2022-12-22 RX ORDER — EPINEPHRINE 1 MG/ML
0.3 INJECTION, SOLUTION INTRAMUSCULAR; SUBCUTANEOUS EVERY 5 MIN PRN
Status: CANCELLED | OUTPATIENT
Start: 2023-03-02

## 2022-12-22 RX ORDER — DIPHENHYDRAMINE HYDROCHLORIDE 50 MG/ML
50 INJECTION INTRAMUSCULAR; INTRAVENOUS
Status: CANCELLED
Start: 2023-03-23

## 2022-12-22 RX ORDER — EPINEPHRINE 1 MG/ML
0.3 INJECTION, SOLUTION INTRAMUSCULAR; SUBCUTANEOUS EVERY 5 MIN PRN
Status: CANCELLED | OUTPATIENT
Start: 2023-03-23

## 2022-12-22 RX ORDER — DIPHENHYDRAMINE HYDROCHLORIDE 50 MG/ML
50 INJECTION INTRAMUSCULAR; INTRAVENOUS
Status: CANCELLED
Start: 2023-05-25

## 2022-12-22 RX ORDER — ALBUTEROL SULFATE 0.83 MG/ML
2.5 SOLUTION RESPIRATORY (INHALATION)
Status: CANCELLED | OUTPATIENT
Start: 2023-01-19

## 2022-12-22 RX ORDER — DIPHENHYDRAMINE HCL 25 MG
50 CAPSULE ORAL
Status: CANCELLED
Start: 2023-03-02

## 2022-12-22 RX ORDER — EPINEPHRINE 1 MG/ML
0.3 INJECTION, SOLUTION INTRAMUSCULAR; SUBCUTANEOUS EVERY 5 MIN PRN
Status: CANCELLED | OUTPATIENT
Start: 2023-02-09

## 2022-12-22 RX ORDER — HEPARIN SODIUM,PORCINE 10 UNIT/ML
5 VIAL (ML) INTRAVENOUS
Status: CANCELLED | OUTPATIENT
Start: 2023-04-13

## 2022-12-22 RX ORDER — ALBUTEROL SULFATE 0.83 MG/ML
2.5 SOLUTION RESPIRATORY (INHALATION)
Status: CANCELLED | OUTPATIENT
Start: 2023-05-25

## 2022-12-22 RX ORDER — METHYLPREDNISOLONE SODIUM SUCCINATE 125 MG/2ML
125 INJECTION, POWDER, LYOPHILIZED, FOR SOLUTION INTRAMUSCULAR; INTRAVENOUS
Status: CANCELLED
Start: 2023-03-02

## 2022-12-22 RX ORDER — MEPERIDINE HYDROCHLORIDE 25 MG/ML
25 INJECTION INTRAMUSCULAR; INTRAVENOUS; SUBCUTANEOUS EVERY 30 MIN PRN
Status: CANCELLED | OUTPATIENT
Start: 2023-03-23

## 2022-12-22 RX ORDER — HEPARIN SODIUM,PORCINE 10 UNIT/ML
5 VIAL (ML) INTRAVENOUS
Status: CANCELLED | OUTPATIENT
Start: 2022-12-29

## 2022-12-22 RX ORDER — MEPERIDINE HYDROCHLORIDE 25 MG/ML
25 INJECTION INTRAMUSCULAR; INTRAVENOUS; SUBCUTANEOUS EVERY 30 MIN PRN
Status: CANCELLED | OUTPATIENT
Start: 2022-12-29

## 2022-12-22 RX ORDER — DIPHENHYDRAMINE HCL 25 MG
50 CAPSULE ORAL
Status: CANCELLED
Start: 2023-05-04

## 2022-12-22 RX ORDER — DIPHENHYDRAMINE HYDROCHLORIDE 50 MG/ML
50 INJECTION INTRAMUSCULAR; INTRAVENOUS
Status: CANCELLED
Start: 2023-05-04

## 2022-12-22 RX ORDER — ALBUTEROL SULFATE 90 UG/1
1-2 AEROSOL, METERED RESPIRATORY (INHALATION)
Status: CANCELLED
Start: 2023-05-25

## 2022-12-22 RX ORDER — HEPARIN SODIUM,PORCINE 10 UNIT/ML
5 VIAL (ML) INTRAVENOUS
Status: CANCELLED | OUTPATIENT
Start: 2023-02-09

## 2022-12-22 RX ORDER — ACETAMINOPHEN 325 MG/1
650 TABLET ORAL
Status: CANCELLED
Start: 2023-03-02

## 2022-12-22 RX ORDER — ALBUTEROL SULFATE 90 UG/1
1-2 AEROSOL, METERED RESPIRATORY (INHALATION)
Status: CANCELLED
Start: 2022-12-29

## 2022-12-22 RX ORDER — METHYLPREDNISOLONE SODIUM SUCCINATE 125 MG/2ML
125 INJECTION, POWDER, LYOPHILIZED, FOR SOLUTION INTRAMUSCULAR; INTRAVENOUS
Status: CANCELLED
Start: 2023-03-23

## 2022-12-22 RX ORDER — ALBUTEROL SULFATE 0.83 MG/ML
2.5 SOLUTION RESPIRATORY (INHALATION)
Status: CANCELLED | OUTPATIENT
Start: 2023-05-04

## 2022-12-22 RX ORDER — MEPERIDINE HYDROCHLORIDE 25 MG/ML
25 INJECTION INTRAMUSCULAR; INTRAVENOUS; SUBCUTANEOUS EVERY 30 MIN PRN
Status: CANCELLED | OUTPATIENT
Start: 2023-02-09

## 2022-12-22 RX ORDER — DIPHENHYDRAMINE HYDROCHLORIDE 50 MG/ML
50 INJECTION INTRAMUSCULAR; INTRAVENOUS
Status: CANCELLED
Start: 2023-03-02

## 2022-12-22 RX ORDER — HEPARIN SODIUM (PORCINE) LOCK FLUSH IV SOLN 100 UNIT/ML 100 UNIT/ML
5 SOLUTION INTRAVENOUS
Status: CANCELLED | OUTPATIENT
Start: 2023-05-25

## 2022-12-22 RX ORDER — ACETAMINOPHEN 325 MG/1
650 TABLET ORAL
Status: CANCELLED
Start: 2023-01-19

## 2022-12-22 RX ORDER — HEPARIN SODIUM,PORCINE 10 UNIT/ML
5 VIAL (ML) INTRAVENOUS
Status: CANCELLED | OUTPATIENT
Start: 2023-05-04

## 2022-12-22 RX ORDER — NALOXONE HYDROCHLORIDE 0.4 MG/ML
0.2 INJECTION, SOLUTION INTRAMUSCULAR; INTRAVENOUS; SUBCUTANEOUS
Status: CANCELLED | OUTPATIENT
Start: 2023-05-25

## 2022-12-22 RX ORDER — DIPHENHYDRAMINE HYDROCHLORIDE 50 MG/ML
50 INJECTION INTRAMUSCULAR; INTRAVENOUS
Status: CANCELLED
Start: 2023-02-09

## 2022-12-22 RX ORDER — NALOXONE HYDROCHLORIDE 0.4 MG/ML
0.2 INJECTION, SOLUTION INTRAMUSCULAR; INTRAVENOUS; SUBCUTANEOUS
Status: CANCELLED | OUTPATIENT
Start: 2023-05-04

## 2022-12-22 RX ORDER — HEPARIN SODIUM,PORCINE 10 UNIT/ML
5 VIAL (ML) INTRAVENOUS
Status: CANCELLED | OUTPATIENT
Start: 2023-03-23

## 2022-12-22 RX ORDER — ALBUTEROL SULFATE 90 UG/1
1-2 AEROSOL, METERED RESPIRATORY (INHALATION)
Status: CANCELLED
Start: 2023-03-23

## 2022-12-22 RX ORDER — ACETAMINOPHEN 325 MG/1
650 TABLET ORAL
Status: CANCELLED
Start: 2023-05-04

## 2022-12-22 RX ORDER — ALBUTEROL SULFATE 90 UG/1
1-2 AEROSOL, METERED RESPIRATORY (INHALATION)
Status: CANCELLED
Start: 2023-01-19

## 2022-12-22 RX ORDER — ALBUTEROL SULFATE 90 UG/1
1-2 AEROSOL, METERED RESPIRATORY (INHALATION)
Status: CANCELLED
Start: 2023-04-13

## 2022-12-22 RX ORDER — HEPARIN SODIUM,PORCINE 10 UNIT/ML
5 VIAL (ML) INTRAVENOUS
Status: CANCELLED | OUTPATIENT
Start: 2023-03-02

## 2022-12-22 RX ORDER — ACETAMINOPHEN 325 MG/1
650 TABLET ORAL
Status: CANCELLED
Start: 2023-03-23

## 2022-12-22 RX ORDER — METHYLPREDNISOLONE SODIUM SUCCINATE 125 MG/2ML
125 INJECTION, POWDER, LYOPHILIZED, FOR SOLUTION INTRAMUSCULAR; INTRAVENOUS
Status: CANCELLED
Start: 2023-05-04

## 2022-12-22 RX ORDER — ALBUTEROL SULFATE 90 UG/1
1-2 AEROSOL, METERED RESPIRATORY (INHALATION)
Status: CANCELLED
Start: 2023-05-04

## 2022-12-22 RX ORDER — HEPARIN SODIUM (PORCINE) LOCK FLUSH IV SOLN 100 UNIT/ML 100 UNIT/ML
5 SOLUTION INTRAVENOUS
Status: CANCELLED | OUTPATIENT
Start: 2023-01-19

## 2022-12-22 RX ORDER — ALBUTEROL SULFATE 0.83 MG/ML
2.5 SOLUTION RESPIRATORY (INHALATION)
Status: CANCELLED | OUTPATIENT
Start: 2023-04-13

## 2022-12-22 RX ORDER — NALOXONE HYDROCHLORIDE 0.4 MG/ML
0.2 INJECTION, SOLUTION INTRAMUSCULAR; INTRAVENOUS; SUBCUTANEOUS
Status: CANCELLED | OUTPATIENT
Start: 2023-03-02

## 2022-12-22 RX ORDER — DIPHENHYDRAMINE HCL 25 MG
50 CAPSULE ORAL
Status: CANCELLED
Start: 2023-03-23

## 2022-12-22 RX ORDER — ACETAMINOPHEN 325 MG/1
650 TABLET ORAL
Status: CANCELLED
Start: 2023-02-09

## 2022-12-22 RX ORDER — EPINEPHRINE 1 MG/ML
0.3 INJECTION, SOLUTION INTRAMUSCULAR; SUBCUTANEOUS EVERY 5 MIN PRN
Status: CANCELLED | OUTPATIENT
Start: 2023-05-25

## 2022-12-22 RX ORDER — DIPHENHYDRAMINE HCL 25 MG
50 CAPSULE ORAL
Status: CANCELLED
Start: 2023-02-09

## 2022-12-22 RX ORDER — MEPERIDINE HYDROCHLORIDE 25 MG/ML
25 INJECTION INTRAMUSCULAR; INTRAVENOUS; SUBCUTANEOUS EVERY 30 MIN PRN
Status: CANCELLED | OUTPATIENT
Start: 2023-01-19

## 2022-12-22 RX ORDER — DIPHENHYDRAMINE HYDROCHLORIDE 50 MG/ML
50 INJECTION INTRAMUSCULAR; INTRAVENOUS
Status: CANCELLED
Start: 2023-01-19

## 2022-12-22 RX ORDER — HEPARIN SODIUM (PORCINE) LOCK FLUSH IV SOLN 100 UNIT/ML 100 UNIT/ML
5 SOLUTION INTRAVENOUS
Status: CANCELLED | OUTPATIENT
Start: 2023-04-13

## 2022-12-22 RX ORDER — ALBUTEROL SULFATE 90 UG/1
1-2 AEROSOL, METERED RESPIRATORY (INHALATION)
Status: CANCELLED
Start: 2023-02-09

## 2022-12-22 RX ORDER — METHYLPREDNISOLONE SODIUM SUCCINATE 125 MG/2ML
125 INJECTION, POWDER, LYOPHILIZED, FOR SOLUTION INTRAMUSCULAR; INTRAVENOUS
Status: CANCELLED
Start: 2022-12-29

## 2022-12-22 RX ORDER — ALBUTEROL SULFATE 0.83 MG/ML
2.5 SOLUTION RESPIRATORY (INHALATION)
Status: CANCELLED | OUTPATIENT
Start: 2022-12-29

## 2022-12-22 RX ORDER — EPINEPHRINE 1 MG/ML
0.3 INJECTION, SOLUTION INTRAMUSCULAR; SUBCUTANEOUS EVERY 5 MIN PRN
Status: CANCELLED | OUTPATIENT
Start: 2023-01-19

## 2022-12-22 RX ORDER — ALBUTEROL SULFATE 90 UG/1
1-2 AEROSOL, METERED RESPIRATORY (INHALATION)
Status: CANCELLED
Start: 2023-03-02

## 2022-12-22 RX ORDER — DIPHENHYDRAMINE HCL 25 MG
50 CAPSULE ORAL
Status: CANCELLED
Start: 2023-01-19

## 2022-12-22 RX ORDER — NALOXONE HYDROCHLORIDE 0.4 MG/ML
0.2 INJECTION, SOLUTION INTRAMUSCULAR; INTRAVENOUS; SUBCUTANEOUS
Status: CANCELLED | OUTPATIENT
Start: 2022-12-29

## 2022-12-22 RX ORDER — METHYLPREDNISOLONE SODIUM SUCCINATE 125 MG/2ML
125 INJECTION, POWDER, LYOPHILIZED, FOR SOLUTION INTRAMUSCULAR; INTRAVENOUS
Status: CANCELLED
Start: 2023-02-09

## 2022-12-22 RX ORDER — METHYLPREDNISOLONE SODIUM SUCCINATE 125 MG/2ML
125 INJECTION, POWDER, LYOPHILIZED, FOR SOLUTION INTRAMUSCULAR; INTRAVENOUS
Status: CANCELLED
Start: 2023-05-25

## 2022-12-22 RX ORDER — ALBUTEROL SULFATE 0.83 MG/ML
2.5 SOLUTION RESPIRATORY (INHALATION)
Status: CANCELLED | OUTPATIENT
Start: 2023-03-23

## 2022-12-22 RX ORDER — METHYLPREDNISOLONE SODIUM SUCCINATE 125 MG/2ML
125 INJECTION, POWDER, LYOPHILIZED, FOR SOLUTION INTRAMUSCULAR; INTRAVENOUS
Status: CANCELLED
Start: 2023-04-13

## 2022-12-22 RX ORDER — MEPERIDINE HYDROCHLORIDE 25 MG/ML
25 INJECTION INTRAMUSCULAR; INTRAVENOUS; SUBCUTANEOUS EVERY 30 MIN PRN
Status: CANCELLED | OUTPATIENT
Start: 2023-04-13

## 2022-12-22 RX ORDER — HEPARIN SODIUM,PORCINE 10 UNIT/ML
5 VIAL (ML) INTRAVENOUS
Status: CANCELLED | OUTPATIENT
Start: 2023-05-25

## 2022-12-22 RX ORDER — ACETAMINOPHEN 325 MG/1
650 TABLET ORAL
Status: CANCELLED
Start: 2023-04-13

## 2022-12-22 RX ORDER — NALOXONE HYDROCHLORIDE 0.4 MG/ML
0.2 INJECTION, SOLUTION INTRAMUSCULAR; INTRAVENOUS; SUBCUTANEOUS
Status: CANCELLED | OUTPATIENT
Start: 2023-01-19

## 2022-12-22 RX ORDER — EPINEPHRINE 1 MG/ML
0.3 INJECTION, SOLUTION INTRAMUSCULAR; SUBCUTANEOUS EVERY 5 MIN PRN
Status: CANCELLED | OUTPATIENT
Start: 2022-12-29

## 2022-12-22 RX ORDER — MEPERIDINE HYDROCHLORIDE 25 MG/ML
25 INJECTION INTRAMUSCULAR; INTRAVENOUS; SUBCUTANEOUS EVERY 30 MIN PRN
Status: CANCELLED | OUTPATIENT
Start: 2023-05-25

## 2022-12-22 RX ORDER — DIPHENHYDRAMINE HCL 25 MG
50 CAPSULE ORAL
Status: CANCELLED
Start: 2022-12-29

## 2022-12-29 ENCOUNTER — INFUSION THERAPY VISIT (OUTPATIENT)
Dept: INFUSION THERAPY | Facility: CLINIC | Age: 77
End: 2022-12-29
Attending: INTERNAL MEDICINE
Payer: COMMERCIAL

## 2022-12-29 VITALS
SYSTOLIC BLOOD PRESSURE: 173 MMHG | RESPIRATION RATE: 16 BRPM | DIASTOLIC BLOOD PRESSURE: 72 MMHG | HEART RATE: 79 BPM | TEMPERATURE: 97.4 F | WEIGHT: 217.6 LBS | OXYGEN SATURATION: 99 % | BODY MASS INDEX: 34.08 KG/M2

## 2022-12-29 DIAGNOSIS — Z17.1 MALIGNANT NEOPLASM OF UPPER-OUTER QUADRANT OF LEFT BREAST IN FEMALE, ESTROGEN RECEPTOR NEGATIVE (H): Primary | ICD-10-CM

## 2022-12-29 DIAGNOSIS — C50.412 MALIGNANT NEOPLASM OF UPPER-OUTER QUADRANT OF LEFT BREAST IN FEMALE, ESTROGEN RECEPTOR NEGATIVE (H): Primary | ICD-10-CM

## 2022-12-29 DIAGNOSIS — Z17.1 MALIGNANT NEOPLASM OF CENTRAL PORTION OF LEFT BREAST IN FEMALE, ESTROGEN RECEPTOR NEGATIVE (H): ICD-10-CM

## 2022-12-29 DIAGNOSIS — C50.112 MALIGNANT NEOPLASM OF CENTRAL PORTION OF LEFT BREAST IN FEMALE, ESTROGEN RECEPTOR NEGATIVE (H): ICD-10-CM

## 2022-12-29 PROCEDURE — 250N000011 HC RX IP 250 OP 636: Performed by: INTERNAL MEDICINE

## 2022-12-29 PROCEDURE — 96413 CHEMO IV INFUSION 1 HR: CPT

## 2022-12-29 PROCEDURE — 258N000003 HC RX IP 258 OP 636: Performed by: INTERNAL MEDICINE

## 2022-12-29 RX ORDER — HEPARIN SODIUM (PORCINE) LOCK FLUSH IV SOLN 100 UNIT/ML 100 UNIT/ML
5 SOLUTION INTRAVENOUS
Status: DISCONTINUED | OUTPATIENT
Start: 2022-12-29 | End: 2022-12-29 | Stop reason: HOSPADM

## 2022-12-29 RX ADMIN — SODIUM CHLORIDE 250 ML: 9 INJECTION, SOLUTION INTRAVENOUS at 14:05

## 2022-12-29 RX ADMIN — SODIUM CHLORIDE 600 MG: 9 INJECTION, SOLUTION INTRAVENOUS at 14:32

## 2022-12-29 RX ADMIN — Medication 5 ML: at 15:12

## 2022-12-29 ASSESSMENT — PAIN SCALES - GENERAL: PAINLEVEL: MODERATE PAIN (4)

## 2022-12-29 NOTE — PROGRESS NOTES
Infusion Nursing Note:  Di Donaldson presents today for Traztuzumab.    Patient seen by provider today: No   present during visit today: Not Applicable.    Note: Patient reports feeling pretty good, mild fatigue. Denies signs/symptoms of illness or infection. B/P elevated 173/71 which she says it usually is when she comes for appt. Has 4/10 bilateral foot pain r/t neuropathy. Afebrile.     Intravenous Access:  Implanted Port.    Treatment Conditions:  ECHO/MUGA completed 10/6/22  EF 60-65%.    Post Infusion Assessment:  Patient tolerated infusion without incident.  Blood return noted pre and post infusion.  Site patent and intact, free from redness, edema or discomfort.  No evidence of extravasations.  Access discontinued per protocol.     Discharge Plan:   AVS to patient via [employer].  Patient will return 1/19/23 for next appointment.   Patient discharged in stable condition accompanied by: self.  Departure Mode: Ambulatory.      Carey Bowen RN

## 2023-01-19 ENCOUNTER — INFUSION THERAPY VISIT (OUTPATIENT)
Dept: INFUSION THERAPY | Facility: CLINIC | Age: 78
End: 2023-01-19
Attending: INTERNAL MEDICINE
Payer: COMMERCIAL

## 2023-01-19 VITALS
WEIGHT: 216.9 LBS | RESPIRATION RATE: 20 BRPM | DIASTOLIC BLOOD PRESSURE: 69 MMHG | OXYGEN SATURATION: 99 % | SYSTOLIC BLOOD PRESSURE: 171 MMHG | HEART RATE: 68 BPM | BODY MASS INDEX: 33.97 KG/M2 | TEMPERATURE: 97.4 F

## 2023-01-19 DIAGNOSIS — C50.412 MALIGNANT NEOPLASM OF UPPER-OUTER QUADRANT OF LEFT BREAST IN FEMALE, ESTROGEN RECEPTOR NEGATIVE (H): Primary | ICD-10-CM

## 2023-01-19 DIAGNOSIS — Z17.1 MALIGNANT NEOPLASM OF UPPER-OUTER QUADRANT OF LEFT BREAST IN FEMALE, ESTROGEN RECEPTOR NEGATIVE (H): Primary | ICD-10-CM

## 2023-01-19 DIAGNOSIS — C50.112 MALIGNANT NEOPLASM OF CENTRAL PORTION OF LEFT BREAST IN FEMALE, ESTROGEN RECEPTOR NEGATIVE (H): ICD-10-CM

## 2023-01-19 DIAGNOSIS — Z17.1 MALIGNANT NEOPLASM OF CENTRAL PORTION OF LEFT BREAST IN FEMALE, ESTROGEN RECEPTOR NEGATIVE (H): ICD-10-CM

## 2023-01-19 PROCEDURE — 96413 CHEMO IV INFUSION 1 HR: CPT

## 2023-01-19 PROCEDURE — 250N000011 HC RX IP 250 OP 636: Performed by: INTERNAL MEDICINE

## 2023-01-19 PROCEDURE — 258N000003 HC RX IP 258 OP 636: Performed by: INTERNAL MEDICINE

## 2023-01-19 RX ORDER — HEPARIN SODIUM (PORCINE) LOCK FLUSH IV SOLN 100 UNIT/ML 100 UNIT/ML
5 SOLUTION INTRAVENOUS
Status: DISCONTINUED | OUTPATIENT
Start: 2023-01-19 | End: 2023-01-19 | Stop reason: HOSPADM

## 2023-01-19 RX ADMIN — SODIUM CHLORIDE 250 ML: 9 INJECTION, SOLUTION INTRAVENOUS at 14:12

## 2023-01-19 RX ADMIN — SODIUM CHLORIDE 570 MG: 9 INJECTION, SOLUTION INTRAVENOUS at 14:21

## 2023-01-19 RX ADMIN — Medication 5 ML: at 15:04

## 2023-01-19 ASSESSMENT — PAIN SCALES - GENERAL: PAINLEVEL: NO PAIN (0)

## 2023-01-19 NOTE — PROGRESS NOTES
Infusion Nursing Note:  Di Donaldson presents today for Trazimera.    Patient seen by provider today: No   present during visit today: Not Applicable.    Note: Patient denies new sx or concerns. Has ongoing neuropathy in hands/feet, hands are a little better than previous but fingertips routinely numb. B/P elevated which is not new. Denies pain, no med changes. Afebrile.     Intravenous Access:  Implanted Port.    Treatment Conditions:  ECHO/MUGA completed 10/6/22  EF 60-65%.    Post Infusion Assessment:  Patient tolerated infusion without incident.  Blood return noted pre and post infusion.  Site patent and intact, free from redness, edema or discomfort.  No evidence of extravasations.  Access discontinued per protocol.     Discharge Plan:   AVS to patient via MYCHART.  Patient will return 2/9 for next appointment.   Patient discharged in stable condition accompanied by: self.  Departure Mode: Ambulatory.      Carey Bowen RN

## 2023-01-20 ENCOUNTER — TELEPHONE (OUTPATIENT)
Dept: ONCOLOGY | Facility: CLINIC | Age: 78
End: 2023-01-20
Payer: COMMERCIAL

## 2023-01-20 NOTE — TELEPHONE ENCOUNTER
Spoke with patient regarding follow-up appointment arranged on 2/9/23. This RN informed patient this will be a video visit and can be completed in clinic on IPAD. This RN reviewed patient's chart, last visit on 10/06/22 with Dr. Meyer recommendation to complete an ECHO prior to follow-up with Dr. Meyer. In results, patient completed ECHO on 10/06/22. This RN will send a message to Dr. Meyer if ECHO should be added to patient's schedule prior to visit on 2/9/23. Waiting response. Will update patient with recommendations. No further questions or concerns.  Kori Newsome RNCC

## 2023-02-02 NOTE — PROGRESS NOTES
"[facility] Hematology / Oncology  Progress Note  Name: Di Donaldson  :  1945    MRN:  [id number]    --------------------    Assessment / Plan:  Early-stage, left-sided, hormone negative, HER-2 positive breast cancer:      Clinically, Di remains well.  She continues to tolerate adjuvant Herceptin well and without major toxicities.  She had an excellent response to neoadjuvant therapy with only a microscopic amount of residual HER2 positive breast cancer left over after a neoadjuvant course of 12 weeks of Taxol combined with Herceptin.  We had discussions of adjuvant Kadcyla but given pretty bad neuropathy following chemotherapy as well as her early-stage cancer and microscopic disease we had good discussions about potential small benefit of Kadcyla versus potential side effects and have elected to remain on Herceptin.  She is healing well.  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  I have given her some liberty to try to minimize the gabapentin to potentially 300 mg at several points during the day and obviously her neuropathy will let her know if she needs to be on 300 mg or 600 mg.  Her chemo related pancytopenia is improving with time and her hemoglobin continues to improve.  We will need some periodic cardiac monitoring and as such I ordered an echocardiogram to be performed but she can safely proceed with treatment today.  Return to clinic 3 months.    Osorio Meyer MD    --------------------    Interval History:  Di returns for follow up of breast cancer unaccompanied via video visit.  All in all, she is doing quite well.  No postsurgical concerns.  No treatment related concerns.  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  Remains worse in the evening.  She remains on gabapentin 600 mg 3 times daily with good control.    --------------------    Physical Exam:  VS: /60   Pulse 87   Temp 97.6  F (36.4  C) (Temporal)   Ht 1.702 m (5' 7\")   Wt 98.3 " kg (216 lb 11.2 oz)   SpO2 96%   BMI 33.94 kg/m    Gen: Well-appearing.    Labs / Imaging / Path:  We reviewed CBC, CMP.    Video Visit:  Di is a 77 year old female who is being evaluated via a billable video visit.  }    Video start time: 1:12 PM  Video end time: 1:30 PM    Provider location: Off-site  Patient location: [facility]    Mode of transmission:  Portal / [employer].

## 2023-02-06 NOTE — TELEPHONE ENCOUNTER
Spoke with patient to provide update regarding timing of ECHO, Dr. Meyer will see patient 2/9/23 to determine. Patient verbalized understanding, no further questions or concerns.  Kori Newsome RNCC

## 2023-02-09 ENCOUNTER — INFUSION THERAPY VISIT (OUTPATIENT)
Dept: INFUSION THERAPY | Facility: CLINIC | Age: 78
End: 2023-02-09
Attending: INTERNAL MEDICINE
Payer: COMMERCIAL

## 2023-02-09 ENCOUNTER — VIRTUAL VISIT (OUTPATIENT)
Dept: ONCOLOGY | Facility: CLINIC | Age: 78
End: 2023-02-09
Payer: COMMERCIAL

## 2023-02-09 VITALS — DIASTOLIC BLOOD PRESSURE: 66 MMHG | SYSTOLIC BLOOD PRESSURE: 162 MMHG | HEART RATE: 69 BPM

## 2023-02-09 VITALS
BODY MASS INDEX: 34.01 KG/M2 | DIASTOLIC BLOOD PRESSURE: 60 MMHG | HEIGHT: 67 IN | WEIGHT: 216.7 LBS | TEMPERATURE: 97.6 F | OXYGEN SATURATION: 96 % | HEART RATE: 87 BPM | SYSTOLIC BLOOD PRESSURE: 134 MMHG

## 2023-02-09 DIAGNOSIS — I42.7 CHEMOTHERAPY INDUCED CARDIOMYOPATHY (H): ICD-10-CM

## 2023-02-09 DIAGNOSIS — C50.112 MALIGNANT NEOPLASM OF CENTRAL PORTION OF LEFT BREAST IN FEMALE, ESTROGEN RECEPTOR NEGATIVE (H): Primary | ICD-10-CM

## 2023-02-09 DIAGNOSIS — C50.112 MALIGNANT NEOPLASM OF CENTRAL PORTION OF LEFT BREAST IN FEMALE, ESTROGEN RECEPTOR NEGATIVE (H): ICD-10-CM

## 2023-02-09 DIAGNOSIS — T45.1X5A ANTINEOPLASTIC CHEMOTHERAPY INDUCED PANCYTOPENIA (H): ICD-10-CM

## 2023-02-09 DIAGNOSIS — G62.0 CHEMOTHERAPY-INDUCED NEUROPATHY (H): ICD-10-CM

## 2023-02-09 DIAGNOSIS — Z17.1 MALIGNANT NEOPLASM OF CENTRAL PORTION OF LEFT BREAST IN FEMALE, ESTROGEN RECEPTOR NEGATIVE (H): Primary | ICD-10-CM

## 2023-02-09 DIAGNOSIS — T45.1X5A CHEMOTHERAPY-INDUCED NEUROPATHY (H): ICD-10-CM

## 2023-02-09 DIAGNOSIS — C50.412 MALIGNANT NEOPLASM OF UPPER-OUTER QUADRANT OF LEFT BREAST IN FEMALE, ESTROGEN RECEPTOR NEGATIVE (H): Primary | ICD-10-CM

## 2023-02-09 DIAGNOSIS — Z17.1 MALIGNANT NEOPLASM OF CENTRAL PORTION OF LEFT BREAST IN FEMALE, ESTROGEN RECEPTOR NEGATIVE (H): ICD-10-CM

## 2023-02-09 DIAGNOSIS — D61.810 ANTINEOPLASTIC CHEMOTHERAPY INDUCED PANCYTOPENIA (H): ICD-10-CM

## 2023-02-09 DIAGNOSIS — T45.1X5A CHEMOTHERAPY INDUCED CARDIOMYOPATHY (H): ICD-10-CM

## 2023-02-09 DIAGNOSIS — Z17.1 MALIGNANT NEOPLASM OF UPPER-OUTER QUADRANT OF LEFT BREAST IN FEMALE, ESTROGEN RECEPTOR NEGATIVE (H): Primary | ICD-10-CM

## 2023-02-09 PROCEDURE — 250N000011 HC RX IP 250 OP 636: Performed by: INTERNAL MEDICINE

## 2023-02-09 PROCEDURE — 99215 OFFICE O/P EST HI 40 MIN: CPT | Mod: VID | Performed by: INTERNAL MEDICINE

## 2023-02-09 PROCEDURE — 258N000003 HC RX IP 258 OP 636: Performed by: INTERNAL MEDICINE

## 2023-02-09 PROCEDURE — 96413 CHEMO IV INFUSION 1 HR: CPT

## 2023-02-09 RX ORDER — HEPARIN SODIUM (PORCINE) LOCK FLUSH IV SOLN 100 UNIT/ML 100 UNIT/ML
5 SOLUTION INTRAVENOUS
Status: DISCONTINUED | OUTPATIENT
Start: 2023-02-09 | End: 2023-02-09 | Stop reason: HOSPADM

## 2023-02-09 RX ADMIN — SODIUM CHLORIDE 250 ML: 9 INJECTION, SOLUTION INTRAVENOUS at 13:45

## 2023-02-09 RX ADMIN — SODIUM CHLORIDE 570 MG: 9 INJECTION, SOLUTION INTRAVENOUS at 14:08

## 2023-02-09 RX ADMIN — Medication 5 ML: at 14:46

## 2023-02-09 NOTE — LETTER
2023         RE: Di Donaldson  [id number] [place] [address]        Dear Colleague,    Thank you for referring your patient, Di Donaldson, to the [facility] CANCER [facility]. Please see a copy of my visit note below.    [facility] Hematology / Oncology  Progress Note  Name: Di Donaldson  :  1945    MRN:  [id number]    --------------------    Assessment / Plan:  Early-stage, left-sided, hormone negative, HER-2 positive breast cancer:      Clinically, Di remains well.  She continues to tolerate adjuvant Herceptin well and without major toxicities.  She had an excellent response to neoadjuvant therapy with only a microscopic amount of residual HER2 positive breast cancer left over after a neoadjuvant course of 12 weeks of Taxol combined with Herceptin.  We had discussions of adjuvant Kadcyla but given pretty bad neuropathy following chemotherapy as well as her early-stage cancer and microscopic disease we had good discussions about potential small benefit of Kadcyla versus potential side effects and have elected to remain on Herceptin.  She is healing well.  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  I have given her some liberty to try to minimize the gabapentin to potentially 300 mg at several points during the day and obviously her neuropathy will let her know if she needs to be on 300 mg or 600 mg.  Her chemo related pancytopenia is improving with time and her hemoglobin continues to improve.  We will need some periodic cardiac monitoring and as such I ordered an echocardiogram to be performed but she can safely proceed with treatment today.  Return to clinic 3 months.    Osorio Meyer MD    --------------------    Interval History:  Di returns for follow up of breast cancer unaccompanied via video visit.  All in all, she is doing quite well.  No postsurgical concerns.  No treatment related concerns.  Taxol related neuropathy  "is a little better.  Remains worse in the evening.  She remains on gabapentin 600 mg 3 times daily with good control.    --------------------    Physical Exam:  VS: /60   Pulse 87   Temp 97.6  F (36.4  C) (Temporal)   Ht 1.702 m (5' 7\")   Wt 98.3 kg (216 lb 11.2 oz)   SpO2 96%   BMI 33.94 kg/m    Gen: Well-appearing.    Labs / Imaging / Path:  We reviewed CBC, CMP.    Video Visit:  Di is a 77 year old female who is being evaluated via a billable video visit.  }    Video start time: 1:12 PM  Video end time: 1:30 PM    Provider location: Off-site  Patient location: [facility]    Mode of transmission: [employer] / [employer].          Again, thank you for allowing me to participate in the care of your patient.        Sincerely,        Osorio Meyer MD    "

## 2023-02-09 NOTE — PATIENT INSTRUCTIONS
1) Proceed w/ Herceptin today.  2)  remainder of Herceptin treatments.  3) ECHO upcoming same day as treatment.  4) BJT MG 3-4 months w/ labs (CBC, CMP).    Osorio Meyer MD.

## 2023-02-09 NOTE — PROGRESS NOTES
Infusion Nursing Note:  Di Donaldson presents today for Trastuzumab.    Patient seen by provider today: Dr. Meyer.    present during visit today: Not Applicable.    Note: Patient arrives after Provider visit. She is ambulatory and in good spirits. Denies pain or pertinent symptoms. See Oncology note from Dr. Meyer today.     Intravenous Access:  Implanted Port.    Treatment Conditions:  ECHO on 10/6/22 LVEF 60-65%.    Post Infusion Assessment:  Patient tolerated infusion without incident.  Blood return noted pre and post infusion.  Site patent and intact, free from redness, edema or discomfort.  No evidence of extravasations.  Access discontinued per protocol.     Discharge Plan:   AVS to patient via MYCHART.  Patient will return in 3 wks for next appointment. Appt to be made by .   Patient discharged in stable condition accompanied by: self.  Departure Mode: Ambulatory.      Carey Bowen RN

## 2023-02-12 ENCOUNTER — HEALTH MAINTENANCE LETTER (OUTPATIENT)
Age: 78
End: 2023-02-12

## 2023-03-09 ENCOUNTER — HOSPITAL ENCOUNTER (OUTPATIENT)
Dept: CARDIOLOGY | Facility: CLINIC | Age: 78
Discharge: HOME | End: 2023-03-09
Attending: INTERNAL MEDICINE
Payer: COMMERCIAL

## 2023-03-09 ENCOUNTER — INFUSION THERAPY VISIT (OUTPATIENT)
Dept: INFUSION THERAPY | Facility: CLINIC | Age: 78
End: 2023-03-09
Attending: INTERNAL MEDICINE
Payer: COMMERCIAL

## 2023-03-09 VITALS
WEIGHT: 219 LBS | SYSTOLIC BLOOD PRESSURE: 172 MMHG | BODY MASS INDEX: 34.3 KG/M2 | HEART RATE: 72 BPM | DIASTOLIC BLOOD PRESSURE: 74 MMHG | RESPIRATION RATE: 16 BRPM | OXYGEN SATURATION: 97 % | TEMPERATURE: 98 F

## 2023-03-09 DIAGNOSIS — C50.412 MALIGNANT NEOPLASM OF UPPER-OUTER QUADRANT OF LEFT BREAST IN FEMALE, ESTROGEN RECEPTOR NEGATIVE (H): Primary | ICD-10-CM

## 2023-03-09 DIAGNOSIS — C50.112 MALIGNANT NEOPLASM OF CENTRAL PORTION OF LEFT BREAST IN FEMALE, ESTROGEN RECEPTOR NEGATIVE (H): ICD-10-CM

## 2023-03-09 DIAGNOSIS — T45.1X5A CHEMOTHERAPY INDUCED CARDIOMYOPATHY (H): ICD-10-CM

## 2023-03-09 DIAGNOSIS — Z17.1 MALIGNANT NEOPLASM OF UPPER-OUTER QUADRANT OF LEFT BREAST IN FEMALE, ESTROGEN RECEPTOR NEGATIVE (H): Primary | ICD-10-CM

## 2023-03-09 DIAGNOSIS — Z17.1 MALIGNANT NEOPLASM OF CENTRAL PORTION OF LEFT BREAST IN FEMALE, ESTROGEN RECEPTOR NEGATIVE (H): ICD-10-CM

## 2023-03-09 DIAGNOSIS — I42.7 CHEMOTHERAPY INDUCED CARDIOMYOPATHY (H): ICD-10-CM

## 2023-03-09 LAB — LVEF ECHO: NORMAL

## 2023-03-09 PROCEDURE — 93308 TTE F-UP OR LMTD: CPT | Mod: 26 | Performed by: INTERNAL MEDICINE

## 2023-03-09 PROCEDURE — 96413 CHEMO IV INFUSION 1 HR: CPT

## 2023-03-09 PROCEDURE — 93321 DOPPLER ECHO F-UP/LMTD STD: CPT

## 2023-03-09 PROCEDURE — 250N000011 HC RX IP 250 OP 636: Performed by: INTERNAL MEDICINE

## 2023-03-09 PROCEDURE — 93325 DOPPLER ECHO COLOR FLOW MAPG: CPT

## 2023-03-09 PROCEDURE — 93325 DOPPLER ECHO COLOR FLOW MAPG: CPT | Mod: 26 | Performed by: INTERNAL MEDICINE

## 2023-03-09 PROCEDURE — 93321 DOPPLER ECHO F-UP/LMTD STD: CPT | Mod: 26 | Performed by: INTERNAL MEDICINE

## 2023-03-09 PROCEDURE — 258N000003 HC RX IP 258 OP 636: Performed by: INTERNAL MEDICINE

## 2023-03-09 RX ORDER — ACETAMINOPHEN 325 MG/1
650 TABLET ORAL
Status: DISCONTINUED | OUTPATIENT
Start: 2023-03-09 | End: 2023-03-09 | Stop reason: HOSPADM

## 2023-03-09 RX ORDER — HEPARIN SODIUM (PORCINE) LOCK FLUSH IV SOLN 100 UNIT/ML 100 UNIT/ML
5 SOLUTION INTRAVENOUS
Status: DISCONTINUED | OUTPATIENT
Start: 2023-03-09 | End: 2023-03-09 | Stop reason: HOSPADM

## 2023-03-09 RX ORDER — DIPHENHYDRAMINE HCL 25 MG
50 CAPSULE ORAL
Status: DISCONTINUED | OUTPATIENT
Start: 2023-03-09 | End: 2023-03-09 | Stop reason: HOSPADM

## 2023-03-09 RX ORDER — LORAZEPAM 2 MG/ML
0.5 INJECTION INTRAMUSCULAR EVERY 4 HOURS PRN
Status: DISCONTINUED | OUTPATIENT
Start: 2023-03-09 | End: 2023-03-09 | Stop reason: HOSPADM

## 2023-03-09 RX ADMIN — SODIUM CHLORIDE 250 ML: 9 INJECTION, SOLUTION INTRAVENOUS at 13:09

## 2023-03-09 RX ADMIN — HEPARIN 5 ML: 100 SYRINGE at 14:20

## 2023-03-09 RX ADMIN — SODIUM CHLORIDE 570 MG: 9 INJECTION, SOLUTION INTRAVENOUS at 13:37

## 2023-03-09 ASSESSMENT — PAIN SCALES - GENERAL: PAINLEVEL: MODERATE PAIN (4)

## 2023-03-09 NOTE — PROGRESS NOTES
Infusion Nursing Note:  Di Donaldson presents today for C12D1 Trazimera.    Patient seen by provider today: No   present during visit today: Not Applicable.    Note: Pt feeling well today. Some minor neuropathy pain in her feet rated at 4\10. Had echo done today before appt.    Intravenous Access:  Implanted Port.    Treatment Conditions:  Results reviewed, labs MET treatment parameters, ok to proceed with treatment.  ECHO/MUGA completed 3/9/2023  EF 60-65%.    Post Infusion Assessment:  Patient tolerated infusion without incident.  Patient observed for 15 minutes post infusion per protocol.     Discharge Plan:   Patient discharged in stable condition accompanied by: self.  Departure Mode: Ambulatory.      Татьяна Goldman RN

## 2023-03-30 ENCOUNTER — INFUSION THERAPY VISIT (OUTPATIENT)
Dept: INFUSION THERAPY | Facility: CLINIC | Age: 78
End: 2023-03-30
Attending: INTERNAL MEDICINE
Payer: COMMERCIAL

## 2023-03-30 VITALS
RESPIRATION RATE: 18 BRPM | SYSTOLIC BLOOD PRESSURE: 136 MMHG | OXYGEN SATURATION: 97 % | DIASTOLIC BLOOD PRESSURE: 56 MMHG | WEIGHT: 217.6 LBS | HEART RATE: 63 BPM | BODY MASS INDEX: 34.08 KG/M2 | TEMPERATURE: 97.6 F

## 2023-03-30 DIAGNOSIS — C50.412 MALIGNANT NEOPLASM OF UPPER-OUTER QUADRANT OF LEFT BREAST IN FEMALE, ESTROGEN RECEPTOR NEGATIVE (H): ICD-10-CM

## 2023-03-30 DIAGNOSIS — Z17.1 MALIGNANT NEOPLASM OF UPPER-OUTER QUADRANT OF LEFT BREAST IN FEMALE, ESTROGEN RECEPTOR NEGATIVE (H): ICD-10-CM

## 2023-03-30 DIAGNOSIS — Z17.1 MALIGNANT NEOPLASM OF CENTRAL PORTION OF LEFT BREAST IN FEMALE, ESTROGEN RECEPTOR NEGATIVE (H): Primary | ICD-10-CM

## 2023-03-30 DIAGNOSIS — C50.112 MALIGNANT NEOPLASM OF CENTRAL PORTION OF LEFT BREAST IN FEMALE, ESTROGEN RECEPTOR NEGATIVE (H): Primary | ICD-10-CM

## 2023-03-30 PROCEDURE — 258N000003 HC RX IP 258 OP 636: Performed by: INTERNAL MEDICINE

## 2023-03-30 PROCEDURE — 96413 CHEMO IV INFUSION 1 HR: CPT

## 2023-03-30 PROCEDURE — 250N000011 HC RX IP 250 OP 636: Performed by: INTERNAL MEDICINE

## 2023-03-30 RX ORDER — HEPARIN SODIUM (PORCINE) LOCK FLUSH IV SOLN 100 UNIT/ML 100 UNIT/ML
5 SOLUTION INTRAVENOUS
Status: DISCONTINUED | OUTPATIENT
Start: 2023-03-30 | End: 2023-03-30 | Stop reason: HOSPADM

## 2023-03-30 RX ADMIN — SODIUM CHLORIDE 570 MG: 9 INJECTION, SOLUTION INTRAVENOUS at 13:49

## 2023-03-30 RX ADMIN — SODIUM CHLORIDE 250 ML: 9 INJECTION, SOLUTION INTRAVENOUS at 13:32

## 2023-03-30 RX ADMIN — HEPARIN 5 ML: 100 SYRINGE at 14:37

## 2023-03-30 ASSESSMENT — PAIN SCALES - GENERAL: PAINLEVEL: MODERATE PAIN (5)

## 2023-03-30 NOTE — PROGRESS NOTES
Infusion Nursing Note:  Di PIÑA Mendoza presents today for C13D1 Trazimera.  Patient seen by provider today: No   present during visit today: Not Applicable.    Note: Pt feeling well today. No significant changes from last visit. Foot neuropathy rated at 5/10 today.    Intravenous Access:  Implanted Port.    Treatment Conditions:  ECHO/MUGA completed 3/09/2023  EF 60-65%.    Post Infusion Assessment:  Patient tolerated infusion without incident.  Patient observed for 15 minutes post chemo per protocol.     Discharge Plan:   Patient discharged in stable condition accompanied by: self.  Departure Mode: Ambulatory.      Татьяна Goldman RN                    
decreased step length/decreased weight-shifting ability

## 2023-04-20 ENCOUNTER — INFUSION THERAPY VISIT (OUTPATIENT)
Dept: INFUSION THERAPY | Facility: CLINIC | Age: 78
End: 2023-04-20
Attending: INTERNAL MEDICINE
Payer: COMMERCIAL

## 2023-04-20 VITALS
DIASTOLIC BLOOD PRESSURE: 59 MMHG | RESPIRATION RATE: 18 BRPM | TEMPERATURE: 97.4 F | WEIGHT: 219.2 LBS | HEART RATE: 74 BPM | SYSTOLIC BLOOD PRESSURE: 152 MMHG | OXYGEN SATURATION: 99 % | BODY MASS INDEX: 34.33 KG/M2

## 2023-04-20 DIAGNOSIS — C50.412 MALIGNANT NEOPLASM OF UPPER-OUTER QUADRANT OF LEFT BREAST IN FEMALE, ESTROGEN RECEPTOR NEGATIVE (H): ICD-10-CM

## 2023-04-20 DIAGNOSIS — Z17.1 MALIGNANT NEOPLASM OF UPPER-OUTER QUADRANT OF LEFT BREAST IN FEMALE, ESTROGEN RECEPTOR NEGATIVE (H): ICD-10-CM

## 2023-04-20 DIAGNOSIS — C50.112 MALIGNANT NEOPLASM OF CENTRAL PORTION OF LEFT BREAST IN FEMALE, ESTROGEN RECEPTOR NEGATIVE (H): Primary | ICD-10-CM

## 2023-04-20 DIAGNOSIS — Z17.1 MALIGNANT NEOPLASM OF CENTRAL PORTION OF LEFT BREAST IN FEMALE, ESTROGEN RECEPTOR NEGATIVE (H): Primary | ICD-10-CM

## 2023-04-20 PROCEDURE — 96413 CHEMO IV INFUSION 1 HR: CPT

## 2023-04-20 PROCEDURE — 258N000003 HC RX IP 258 OP 636: Performed by: INTERNAL MEDICINE

## 2023-04-20 PROCEDURE — 250N000011 HC RX IP 250 OP 636: Performed by: INTERNAL MEDICINE

## 2023-04-20 RX ORDER — HEPARIN SODIUM (PORCINE) LOCK FLUSH IV SOLN 100 UNIT/ML 100 UNIT/ML
5 SOLUTION INTRAVENOUS
Status: DISCONTINUED | OUTPATIENT
Start: 2023-04-20 | End: 2023-04-20 | Stop reason: HOSPADM

## 2023-04-20 RX ADMIN — HEPARIN 5 ML: 100 SYRINGE at 14:04

## 2023-04-20 RX ADMIN — SODIUM CHLORIDE 570 MG: 9 INJECTION, SOLUTION INTRAVENOUS at 13:24

## 2023-04-20 RX ADMIN — SODIUM CHLORIDE 250 ML: 9 INJECTION, SOLUTION INTRAVENOUS at 12:49

## 2023-04-20 ASSESSMENT — PAIN SCALES - GENERAL: PAINLEVEL: MILD PAIN (3)

## 2023-04-20 NOTE — PROGRESS NOTES
Infusion Nursing Note:  Di PIÑA Mendoza presents today for c14d1 Trazimera.    Patient seen by provider today: No   present during visit today: Not Applicable.    Note: Feeling well today. Just some mild foot neuropathy. Excited that this will be her last cycle.      Intravenous Access:  Implanted Port.    Treatment Conditions:  ECHO/MUGA completed 3/9/2023  EF 60-65%.      Post Infusion Assessment:  Patient tolerated infusion without incident.  Patient observed for 15 minutes post chemo per protocol.       Discharge Plan:   Patient discharged in stable condition accompanied by: self.  Departure Mode: Ambulatory.      Татьяна Goldman RN

## 2023-05-20 ENCOUNTER — HEALTH MAINTENANCE LETTER (OUTPATIENT)
Age: 78
End: 2023-05-20

## 2023-06-01 ENCOUNTER — LAB (OUTPATIENT)
Dept: INFUSION THERAPY | Facility: CLINIC | Age: 78
End: 2023-06-01
Attending: INTERNAL MEDICINE
Payer: COMMERCIAL

## 2023-06-01 ENCOUNTER — ONCOLOGY VISIT (OUTPATIENT)
Dept: ONCOLOGY | Facility: CLINIC | Age: 78
End: 2023-06-01
Payer: COMMERCIAL

## 2023-06-01 VITALS
DIASTOLIC BLOOD PRESSURE: 60 MMHG | SYSTOLIC BLOOD PRESSURE: 140 MMHG | BODY MASS INDEX: 34.53 KG/M2 | WEIGHT: 220 LBS | HEIGHT: 67 IN | OXYGEN SATURATION: 98 % | TEMPERATURE: 98.1 F | HEART RATE: 83 BPM

## 2023-06-01 DIAGNOSIS — T45.1X5A ANEMIA DUE TO CHEMOTHERAPY: ICD-10-CM

## 2023-06-01 DIAGNOSIS — C50.112 MALIGNANT NEOPLASM OF CENTRAL PORTION OF LEFT BREAST IN FEMALE, ESTROGEN RECEPTOR NEGATIVE (H): Primary | ICD-10-CM

## 2023-06-01 DIAGNOSIS — C50.912 HER2-POSITIVE CARCINOMA OF LEFT BREAST (H): ICD-10-CM

## 2023-06-01 DIAGNOSIS — D64.81 ANEMIA DUE TO CHEMOTHERAPY: ICD-10-CM

## 2023-06-01 DIAGNOSIS — G62.0 CHEMOTHERAPY-INDUCED NEUROPATHY (H): ICD-10-CM

## 2023-06-01 DIAGNOSIS — Z17.1 MALIGNANT NEOPLASM OF OVERLAPPING SITES OF LEFT BREAST IN FEMALE, ESTROGEN RECEPTOR NEGATIVE (H): ICD-10-CM

## 2023-06-01 DIAGNOSIS — Z17.1 MALIGNANT NEOPLASM OF CENTRAL PORTION OF LEFT BREAST IN FEMALE, ESTROGEN RECEPTOR NEGATIVE (H): Primary | ICD-10-CM

## 2023-06-01 DIAGNOSIS — Z17.31 HER2-POSITIVE CARCINOMA OF LEFT BREAST (H): ICD-10-CM

## 2023-06-01 DIAGNOSIS — Z12.31 SCREENING MAMMOGRAM FOR BREAST CANCER: ICD-10-CM

## 2023-06-01 DIAGNOSIS — T45.1X5A CHEMOTHERAPY-INDUCED NEUROPATHY (H): ICD-10-CM

## 2023-06-01 DIAGNOSIS — C50.812 MALIGNANT NEOPLASM OF OVERLAPPING SITES OF LEFT BREAST IN FEMALE, ESTROGEN RECEPTOR NEGATIVE (H): ICD-10-CM

## 2023-06-01 DIAGNOSIS — C50.412 MALIGNANT NEOPLASM OF UPPER-OUTER QUADRANT OF LEFT BREAST IN FEMALE, ESTROGEN RECEPTOR NEGATIVE (H): ICD-10-CM

## 2023-06-01 DIAGNOSIS — Z17.1 MALIGNANT NEOPLASM OF UPPER-OUTER QUADRANT OF LEFT BREAST IN FEMALE, ESTROGEN RECEPTOR NEGATIVE (H): ICD-10-CM

## 2023-06-01 LAB
ALBUMIN SERPL BCG-MCNC: 3.7 G/DL (ref 3.5–5.2)
ALP SERPL-CCNC: 84 U/L (ref 35–104)
ALT SERPL W P-5'-P-CCNC: 19 U/L (ref 10–35)
ANION GAP SERPL CALCULATED.3IONS-SCNC: 9 MMOL/L (ref 7–15)
AST SERPL W P-5'-P-CCNC: 16 U/L (ref 10–35)
BASOPHILS # BLD AUTO: 0.1 10E3/UL (ref 0–0.2)
BASOPHILS NFR BLD AUTO: 1 %
BILIRUB SERPL-MCNC: 0.3 MG/DL
BUN SERPL-MCNC: 22.1 MG/DL (ref 8–23)
CALCIUM SERPL-MCNC: 9.2 MG/DL (ref 8.8–10.2)
CHLORIDE SERPL-SCNC: 99 MMOL/L (ref 98–107)
CREAT SERPL-MCNC: 1.25 MG/DL (ref 0.51–0.95)
DEPRECATED HCO3 PLAS-SCNC: 26 MMOL/L (ref 22–29)
EOSINOPHIL # BLD AUTO: 0.2 10E3/UL (ref 0–0.7)
EOSINOPHIL NFR BLD AUTO: 3 %
ERYTHROCYTE [DISTWIDTH] IN BLOOD BY AUTOMATED COUNT: 12.9 % (ref 10–15)
GFR SERPL CREATININE-BSD FRML MDRD: 44 ML/MIN/1.73M2
GLUCOSE SERPL-MCNC: 445 MG/DL (ref 70–99)
HCT VFR BLD AUTO: 33 % (ref 35–47)
HGB BLD-MCNC: 10.7 G/DL (ref 11.7–15.7)
IMM GRANULOCYTES # BLD: 0 10E3/UL
IMM GRANULOCYTES NFR BLD: 1 %
LYMPHOCYTES # BLD AUTO: 0.7 10E3/UL (ref 0.8–5.3)
LYMPHOCYTES NFR BLD AUTO: 11 %
MCH RBC QN AUTO: 30.1 PG (ref 26.5–33)
MCHC RBC AUTO-ENTMCNC: 32.4 G/DL (ref 31.5–36.5)
MCV RBC AUTO: 93 FL (ref 78–100)
MONOCYTES # BLD AUTO: 0.4 10E3/UL (ref 0–1.3)
MONOCYTES NFR BLD AUTO: 6 %
NEUTROPHILS # BLD AUTO: 4.8 10E3/UL (ref 1.6–8.3)
NEUTROPHILS NFR BLD AUTO: 78 %
NRBC # BLD AUTO: 0 10E3/UL
NRBC BLD AUTO-RTO: 0 /100
PLATELET # BLD AUTO: 159 10E3/UL (ref 150–450)
POTASSIUM SERPL-SCNC: 4.6 MMOL/L (ref 3.4–5.3)
PROT SERPL-MCNC: 6.2 G/DL (ref 6.4–8.3)
RBC # BLD AUTO: 3.56 10E6/UL (ref 3.8–5.2)
SODIUM SERPL-SCNC: 134 MMOL/L (ref 136–145)
WBC # BLD AUTO: 6.1 10E3/UL (ref 4–11)

## 2023-06-01 PROCEDURE — 36591 DRAW BLOOD OFF VENOUS DEVICE: CPT

## 2023-06-01 PROCEDURE — 85025 COMPLETE CBC W/AUTO DIFF WBC: CPT

## 2023-06-01 PROCEDURE — 80053 COMPREHEN METABOLIC PANEL: CPT

## 2023-06-01 PROCEDURE — 250N000011 HC RX IP 250 OP 636: Performed by: INTERNAL MEDICINE

## 2023-06-01 PROCEDURE — 99214 OFFICE O/P EST MOD 30 MIN: CPT | Mod: 95 | Performed by: INTERNAL MEDICINE

## 2023-06-01 RX ORDER — HEPARIN SODIUM (PORCINE) LOCK FLUSH IV SOLN 100 UNIT/ML 100 UNIT/ML
5 SOLUTION INTRAVENOUS
Status: DISCONTINUED | OUTPATIENT
Start: 2023-06-01 | End: 2023-06-01

## 2023-06-01 RX ORDER — HEPARIN SODIUM (PORCINE) LOCK FLUSH IV SOLN 100 UNIT/ML 100 UNIT/ML
5 SOLUTION INTRAVENOUS
Status: CANCELLED | OUTPATIENT
Start: 2023-06-01

## 2023-06-01 RX ORDER — INSULIN DETEMIR 100 [IU]/ML
INJECTION, SOLUTION SUBCUTANEOUS
COMMUNITY
Start: 2023-05-22

## 2023-06-01 RX ADMIN — HEPARIN 5 ML: 100 SYRINGE at 14:45

## 2023-06-01 NOTE — PROGRESS NOTES
"[facility] Hematology / Oncology  Progress Note  Name: Di Donaldson  :  1945    MRN:  [id number]    --------------------    Assessment / Plan:  Early-stage, left-sided, hormone negative, HER-2 positive breast cancer:    Clinically, she will remains well from a breast cancer standpoint.  She has now completed 12 months of adjuvant Herceptin which she tolerated well and without major toxicities.  Her chemo related neuropathy is improving slowly over time, but unfortunately she continues to require gabapentin 600 mg 3 times daily to help control symptoms and attempts to wean have been met with increased neuropathy.  Her chemo related cytopenias are improving and she continues to have a touch of mild normocytic anemia that bears future monitoring.  We are planning on an echocardiogram 12 months from completing Herceptin.  We are planning to see her back in 6 months time.  She is due for a mammogram at this time as well.    Osorio Meyer MD    --------------------    Interval History:  Di returns for follow up of breast cancer unaccompanied via video visit.  All in all, she is doing quite well.  No postsurgical concerns.  No treatment related concerns.  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  Gabapentin continues to help.  --------------------    Physical Exam:  VS: BP (!) 140/60   Pulse 83   Temp 98.1  F (36.7  C) (Temporal)   Ht 1.689 m (5' 6.5\")   Wt 99.8 kg (220 lb)   SpO2 98%   BMI 34.98 kg/m    Gen: Well-appearing.  Breast: Surgical sites well-healed; fairly typical scar tissue at the site; no other palpable masses; no axillary adenopathy.    Labs / Imaging / Path:  We reviewed CBC, CMP.  "

## 2023-06-01 NOTE — LETTER
"    2023         RE: Di Donaldson  [address] [place] [address]        Dear Colleague,    Thank you for referring your patient, Di Donaldson, to the [facility] CANCER CENTER [place]. Please see a copy of my visit note below.    [facility] Hematology / Oncology  Progress Note  Name: Di Donaldson  :  1945    MRN:  [id number]    --------------------    Assessment / Plan:  Early-stage, left-sided, hormone negative, HER-2 positive breast cancer:    Clinically, she will remains well from a breast cancer standpoint.  She has now completed 12 months of adjuvant Herceptin which she tolerated well and without major toxicities.  Her chemo related neuropathy is improving slowly over time, but unfortunately she continues to require gabapentin 600 mg 3 times daily to help control symptoms and attempts to wean have been met with increased neuropathy.  Her chemo related cytopenias are improving and she continues to have a touch of mild normocytic anemia that bears future monitoring.  We are planning on an echocardiogram 12 months from completing Herceptin.  We are planning to see her back in 6 months time.  She is due for a mammogram at this time as well.    Osorio Meyer MD    --------------------    Interval History:  Di returns for follow up of breast cancer unaccompanied via video visit.  All in all, she is doing quite well.  No postsurgical concerns.  No treatment related concerns.  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  Gabapentin continues to help.  --------------------    Physical Exam:  VS: BP (!) 140/60   Pulse 83   Temp 98.1  F (36.7  C) (Temporal)   Ht 1.689 m (5' 6.5\")   Wt 99.8 kg (220 lb)   SpO2 98%   BMI 34.98 kg/m    Gen: Well-appearing.  Breast: Surgical sites well-healed; fairly typical scar tissue at the site; no other palpable masses; no axillary " adenopathy.    Labs / Imaging / Path:  We reviewed CBC, CMP.      Again, thank you for allowing me to participate in the care of your patient.        Sincerely,        Osorio Meyer MD

## 2023-06-01 NOTE — PROGRESS NOTES
Infusion Nursing Note:  Di Donaldson presents today for port labs and port flush.    Patient seen by provider today: Yes: virtual visit with    present during visit today: Not Applicable.    Note: Pt ambulated to unit today for port labs prior to her virtual appointment with . Port accessed. Labs drawn without difficulty. Pt is having a knee replacement the beginning of June.  Pt is hoping to get her port out soon.     Intravenous Access:  Implanted Port.    Treatment Conditions:  Not Applicable.      Post Infusion Assessment:  Blood return noted pre and post infusion.  Access discontinued per protocol.       Discharge Plan:   Patient discharged in stable condition accompanied by: self.  Departure Mode: Ambulatory  Pt has appt for next port flush on 07/13/2023.      Britney Tilley RN

## 2023-07-05 ENCOUNTER — TRANSFERRED RECORDS (OUTPATIENT)
Dept: HEALTH INFORMATION MANAGEMENT | Facility: CLINIC | Age: 78
End: 2023-07-05

## 2023-07-13 ENCOUNTER — INFUSION THERAPY VISIT (OUTPATIENT)
Dept: INFUSION THERAPY | Facility: CLINIC | Age: 78
End: 2023-07-13
Attending: INTERNAL MEDICINE
Payer: COMMERCIAL

## 2023-07-13 DIAGNOSIS — Z17.31 HER2-POSITIVE CARCINOMA OF LEFT BREAST (H): ICD-10-CM

## 2023-07-13 DIAGNOSIS — Z17.1 MALIGNANT NEOPLASM OF OVERLAPPING SITES OF LEFT BREAST IN FEMALE, ESTROGEN RECEPTOR NEGATIVE (H): ICD-10-CM

## 2023-07-13 DIAGNOSIS — C50.912 HER2-POSITIVE CARCINOMA OF LEFT BREAST (H): ICD-10-CM

## 2023-07-13 DIAGNOSIS — C50.112 MALIGNANT NEOPLASM OF CENTRAL PORTION OF LEFT BREAST IN FEMALE, ESTROGEN RECEPTOR NEGATIVE (H): Primary | ICD-10-CM

## 2023-07-13 DIAGNOSIS — C50.412 MALIGNANT NEOPLASM OF UPPER-OUTER QUADRANT OF LEFT BREAST IN FEMALE, ESTROGEN RECEPTOR NEGATIVE (H): ICD-10-CM

## 2023-07-13 DIAGNOSIS — C50.812 MALIGNANT NEOPLASM OF OVERLAPPING SITES OF LEFT BREAST IN FEMALE, ESTROGEN RECEPTOR NEGATIVE (H): ICD-10-CM

## 2023-07-13 DIAGNOSIS — Z17.1 MALIGNANT NEOPLASM OF CENTRAL PORTION OF LEFT BREAST IN FEMALE, ESTROGEN RECEPTOR NEGATIVE (H): Primary | ICD-10-CM

## 2023-07-13 DIAGNOSIS — Z17.1 MALIGNANT NEOPLASM OF UPPER-OUTER QUADRANT OF LEFT BREAST IN FEMALE, ESTROGEN RECEPTOR NEGATIVE (H): ICD-10-CM

## 2023-07-13 PROCEDURE — 250N000011 HC RX IP 250 OP 636: Mod: JZ | Performed by: INTERNAL MEDICINE

## 2023-07-13 PROCEDURE — 96523 IRRIG DRUG DELIVERY DEVICE: CPT

## 2023-07-13 RX ORDER — HEPARIN SODIUM (PORCINE) LOCK FLUSH IV SOLN 100 UNIT/ML 100 UNIT/ML
5 SOLUTION INTRAVENOUS
Status: DISCONTINUED | OUTPATIENT
Start: 2023-07-13 | End: 2023-07-13 | Stop reason: HOSPADM

## 2023-07-13 RX ORDER — HEPARIN SODIUM (PORCINE) LOCK FLUSH IV SOLN 100 UNIT/ML 100 UNIT/ML
5 SOLUTION INTRAVENOUS
Status: CANCELLED | OUTPATIENT
Start: 2023-07-13

## 2023-07-13 RX ADMIN — HEPARIN 5 ML: 100 SYRINGE at 12:59

## 2023-07-13 NOTE — PROGRESS NOTES
Infusion Nursing Note:  Di Donaldson presents today for a port flush.    Patient seen by provider today: No   present during visit today: Not Applicable.    Note: N/A.      Intravenous Access:  Implanted Port.      Татьяна Goldman RN

## 2023-07-17 ENCOUNTER — MYC MEDICAL ADVICE (OUTPATIENT)
Dept: ONCOLOGY | Facility: CLINIC | Age: 78
End: 2023-07-17
Payer: COMMERCIAL

## 2023-07-18 NOTE — TELEPHONE ENCOUNTER
Spoke with patient, arranged port removal with Dr. Reese. Informed patient this appointment will be a consult first and if removal is appropriate in clinic this can be done on 08/02/23. Patient asked if she is currently on a blood thinner, patient denied. Patient verbalized understanding, no further questions or concerns.  Kori Newsome RNCC

## 2023-08-02 ENCOUNTER — OFFICE VISIT (OUTPATIENT)
Dept: SURGERY | Facility: CLINIC | Age: 78
End: 2023-08-02
Payer: COMMERCIAL

## 2023-08-02 VITALS
DIASTOLIC BLOOD PRESSURE: 68 MMHG | WEIGHT: 220 LBS | BODY MASS INDEX: 34.53 KG/M2 | HEIGHT: 67 IN | TEMPERATURE: 98 F | SYSTOLIC BLOOD PRESSURE: 138 MMHG

## 2023-08-02 DIAGNOSIS — Z98.890 HISTORY OF REMOVAL OF PORT-A-CATH: Primary | ICD-10-CM

## 2023-08-02 PROCEDURE — 36590 REMOVAL TUNNELED CV CATH: CPT | Performed by: SURGERY

## 2023-08-02 PROCEDURE — 99212 OFFICE O/P EST SF 10 MIN: CPT | Mod: 25 | Performed by: SURGERY

## 2023-08-02 ASSESSMENT — PAIN SCALES - GENERAL: PAINLEVEL: NO PAIN (0)

## 2023-08-02 NOTE — LETTER
"    8/2/2023         RE: Di Donaldson  [address] [place] [address]        Dear Colleague,    Thank you for referring your patient, Di Donaldson, to the [facility]. Please see a copy of my visit note below.    Patient seen in consultation for port removal by Osorio Meyer    HPI:  Patient is a 77 year old female with history of right-sided port for 1 year for chemotherapy after diagnosis of breast cancer.  This was placed by myself on the right side with ultrasound and fluoroscopy.  She has had no issues with it.  She does not take blood thinning medication.  She no longer needs the port for chemotherapy.    Review Of Systems    Skin: negative  Ears/Nose/Throat: negative  Respiratory: No shortness of breath, dyspnea on exertion, cough, or hemoptysis  Cardiovascular: negative  Gastrointestinal: negative  Genitourinary: negative  Musculoskeletal: negative  Neurologic: negative  Hematologic/Lymphatic/Immunologic: negative  Endocrine: negative      Past Medical History:   Diagnosis Date     hypertension      Hypothyroidism      Type 2 diabetes mellitus (H)        Past Surgical History:   Procedure Laterality Date     BACK SURGERY      cervical fusion     GYN SURGERY      partial hysterectomy     INSERT PORT VASCULAR ACCESS Right 02/07/2022    Procedure: ultrasound guided right internal jugular venous access port placement with flouroscopy;  Surgeon: Rolando Reese DO;  Location: [facility]     LUMPECTOMY BREAST WITH SENTINEL NODE, COMBINED Left 6/1/2022    Procedure: LEFT Breast Radiofrequency Identification Seed-Localized Segmental Mastectomy (=\"Lumpectomy\"), LEFT Axillary [place] Lymph Node Biopsy;  Surgeon: Ryann Srinivasan MD;  Location: [facility] OR     TOTAL KNEE ARTHROPLASTY Left 11/2021       No family history on file.    Social History     Socioeconomic History     Marital status:      Spouse name: Not on file     Number of children: Not on file     Years " of education: Not on file     Highest education level: Not on file   Occupational History     Not on file   Tobacco Use     Smoking status: Never     Smokeless tobacco: Never   Substance and Sexual Activity     Alcohol use: Yes     Drug use: Never     Sexual activity: Not on file   Other Topics Concern     Not on file   Social History Narrative     Not on file     Social Determinants of Health     Financial Resource Strain: Not on file   Food Insecurity: Not on file   Transportation Needs: Not on file   Physical Activity: Not on file   Stress: Not on file   Social Connections: Not on file   Intimate Partner Violence: Not on file   Housing Stability: Not on file       Current Outpatient Medications   Medication Sig Dispense Refill     aspirin (ASA) 325 MG EC tablet TAKE 1 TABLET BY MOUTH ONE TIME A DAY. DO NOT SPLIT OR CRUSH.       cholecalciferol (VITAMIN D3) 10 mcg (400 units) TABS tablet Take 1 tablet by mouth       Coenzyme Q10 (CO Q 10) 10 MG CAPS Take 200 mg by mouth       colestipol (COLESTID) 1 g tablet Take 1 g by mouth 2 times daily        Cyanocobalamin (VITAMIN B-12 PO) Take 1 tablet by mouth       ezetimibe (ZETIA) 10 MG tablet 10 mg       ferrous sulfate (FEROSUL) 325 (65 Fe) MG tablet Take 325 mg by mouth       gabapentin (NEURONTIN) 600 MG tablet Take 600 mg by mouth 3 times daily        insulin lispro (HUMALOG KWIKPEN) 100 UNIT/ML (1 unit dial) KWIKPEN Inject Subcutaneous 3 times daily (before meals)       LEVEMIR FLEXTOUCH 100 UNIT/ML pen INJECT 55 UNITS SUBCUTANEOUSLY EVERY EVENING       levothyroxine (SYNTHROID/LEVOTHROID) 112 MCG tablet Take 112 mcg by mouth       lisinopril (ZESTRIL) 40 MG tablet Take 20 mg by mouth       magnesium 100 MG CAPS Take 400 mg by mouth 2 times daily        Melatonin 10 MG TABS tablet Take 10 mg by mouth       mirabegron (MYRBETRIQ) 50 MG 24 hr tablet Take 50 mg by mouth daily       traZODone (DESYREL) 50 MG tablet 50 mg       triamcinolone (KENALOG) 0.1 % external  "ointment          Medications and history reviewed    Physical exam:  Vitals: /68   Temp 98  F (36.7  C) (Temporal)   Ht 1.689 m (5' 6.5\")   Wt 99.8 kg (220 lb)   BMI 34.98 kg/m    BMI= Body mass index is 34.98 kg/m .    Constitutional: Healthy, alert, non-distressed   Head: Normo-cephalic, atraumatic, no lesions, masses or tenderness   Cardiovascular: RRR, no new murmurs, +S1, +S2 heart sounds, no clicks, rubs or gallops   Respiratory: CTAB, no rales, rhonchi or wheezing, equal chest rise, good respiratory effort   Gastrointestinal: Soft, non-tender, non distended, no rebound rigidity or guarding, no masses or hernias palpated   : Deferred  Musculoskeletal: Moves all extremities, normal  strength, no deformities noted   Skin: No suspicious lesions or rashes   Psychiatric: Mentation appears normal, affect appropriate   Hematologic/Lymphatic/Immunologic: Normal cervical and supraclavicular lymph nodes   Patient able to get up on table without difficulty.    Labs show:  No results found for this or any previous visit (from the past 24 hour(s)).    Imaging shows:  No results found for this or any previous visit (from the past 744 hour(s)).     Assessment:     ICD-10-CM    1. History of removal of Port-a-Cath  Z98.890         Plan: I recommend and offered in office port removal today.  After we discussed the procedure and the aftercare we agreed to proceed.    PROCEDURE: Port removal     Written consent was obtained    The right chest area was prepped and appropriately anesthetized with 1% lidocaine with epinephrine. Using the usual technique, vision of subcutaneous port and attached catheter was performed.  Fibrous capsule was incised and port was removed with blunt dissection.  Figure-of-eight 3-0 Vicryl suture was used to close the tract.  Incision was closed with inverted interrupted 3-0 Vicryl suture for the dermal layer and a running subcuticular 4-0 Monocryl suture for the skin.  Total length of " the incision after closure was 3.0 cm. An appropriate dressing was applied.  The procedure was well tolerated and without complications.    Dr. Hugh DO, FACS      45 minutes  spent by me on the date of the encounter doing chart review, history and exam, documentation and further activities per the note.  30 minutes of which was the procedure itself    Rolando Reese DO      Again, thank you for allowing me to participate in the care of your patient.        Sincerely,        Rolando Reese DO

## 2023-08-02 NOTE — PROGRESS NOTES
"Patient seen in consultation for port removal by Osorio Meyer    HPI:  Patient is a 77 year old female with history of right-sided port for 1 year for chemotherapy after diagnosis of breast cancer.  This was placed by myself on the right side with ultrasound and fluoroscopy.  She has had no issues with it.  She does not take blood thinning medication.  She no longer needs the port for chemotherapy.    Review Of Systems    Skin: negative  Ears/Nose/Throat: negative  Respiratory: No shortness of breath, dyspnea on exertion, cough, or hemoptysis  Cardiovascular: negative  Gastrointestinal: negative  Genitourinary: negative  Musculoskeletal: negative  Neurologic: negative  Hematologic/Lymphatic/Immunologic: negative  Endocrine: negative      Past Medical History:   Diagnosis Date    hypertension     Hypothyroidism     Type 2 diabetes mellitus (H)        Past Surgical History:   Procedure Laterality Date    BACK SURGERY      cervical fusion    GYN SURGERY      partial hysterectomy    INSERT PORT VASCULAR ACCESS Right 02/07/2022    Procedure: ultrasound guided right internal jugular venous access port placement with flouroscopy;  Surgeon: Rolando Reese DO;  Location[facility]    LUMPECTOMY BREAST WITH SENTINEL NODE, COMBINED Left 6/1/2022    Procedure: LEFT Breast Radiofrequency Identification Seed-Localized Segmental Mastectomy (=\"Lumpectomy\"), LEFT Axillary [place] Lymph Node Biopsy;  Surgeon: Ryann Srinivasan MD;  Location: [facility]    TOTAL KNEE ARTHROPLASTY Left 11/2021       No family history on file.    Social History     Socioeconomic History    Marital status:      Spouse name: Not on file    Number of children: Not on file    Years of education: Not on file    Highest education level: Not on file   Occupational History    Not on file   Tobacco Use    Smoking status: Never    Smokeless tobacco: Never   Substance and Sexual Activity    Alcohol use: Yes    Drug use: Never    Sexual " "activity: Not on file   Other Topics Concern    Not on file   Social History Narrative    Not on file     Social Determinants of Health     Financial Resource Strain: Not on file   Food Insecurity: Not on file   Transportation Needs: Not on file   Physical Activity: Not on file   Stress: Not on file   Social Connections: Not on file   Intimate Partner Violence: Not on file   Housing Stability: Not on file       Current Outpatient Medications   Medication Sig Dispense Refill    aspirin (ASA) 325 MG EC tablet TAKE 1 TABLET BY MOUTH ONE TIME A DAY. DO NOT SPLIT OR CRUSH.      cholecalciferol (VITAMIN D3) 10 mcg (400 units) TABS tablet Take 1 tablet by mouth      Coenzyme Q10 (CO Q 10) 10 MG CAPS Take 200 mg by mouth      colestipol (COLESTID) 1 g tablet Take 1 g by mouth 2 times daily       Cyanocobalamin (VITAMIN B-12 PO) Take 1 tablet by mouth      ezetimibe (ZETIA) 10 MG tablet 10 mg      ferrous sulfate (FEROSUL) 325 (65 Fe) MG tablet Take 325 mg by mouth      gabapentin (NEURONTIN) 600 MG tablet Take 600 mg by mouth 3 times daily       insulin lispro (HUMALOG KWIKPEN) 100 UNIT/ML (1 unit dial) KWIKPEN Inject Subcutaneous 3 times daily (before meals)      LEVEMIR FLEXTOUCH 100 UNIT/ML pen INJECT 55 UNITS SUBCUTANEOUSLY EVERY EVENING      levothyroxine (SYNTHROID/LEVOTHROID) 112 MCG tablet Take 112 mcg by mouth      lisinopril (ZESTRIL) 40 MG tablet Take 20 mg by mouth      magnesium 100 MG CAPS Take 400 mg by mouth 2 times daily       Melatonin 10 MG TABS tablet Take 10 mg by mouth      mirabegron (MYRBETRIQ) 50 MG 24 hr tablet Take 50 mg by mouth daily      traZODone (DESYREL) 50 MG tablet 50 mg      triamcinolone (KENALOG) 0.1 % external ointment          Medications and history reviewed    Physical exam:  Vitals: /68   Temp 98  F (36.7  C) (Temporal)   Ht 1.689 m (5' 6.5\")   Wt 99.8 kg (220 lb)   BMI 34.98 kg/m    BMI= Body mass index is 34.98 kg/m .    Constitutional: Healthy, alert, non-distressed "   Head: Normo-cephalic, atraumatic, no lesions, masses or tenderness   Cardiovascular: RRR, no new murmurs, +S1, +S2 heart sounds, no clicks, rubs or gallops   Respiratory: CTAB, no rales, rhonchi or wheezing, equal chest rise, good respiratory effort   Gastrointestinal: Soft, non-tender, non distended, no rebound rigidity or guarding, no masses or hernias palpated   : Deferred  Musculoskeletal: Moves all extremities, normal  strength, no deformities noted   Skin: No suspicious lesions or rashes   Psychiatric: Mentation appears normal, affect appropriate   Hematologic/Lymphatic/Immunologic: Normal cervical and supraclavicular lymph nodes   Patient able to get up on table without difficulty.    Labs show:  No results found for this or any previous visit (from the past 24 hour(s)).    Imaging shows:  No results found for this or any previous visit (from the past 744 hour(s)).     Assessment:     ICD-10-CM    1. History of removal of Port-a-Cath  Z98.890         Plan: I recommend and offered in office port removal today.  After we discussed the procedure and the aftercare we agreed to proceed.    PROCEDURE: Port removal     Written consent was obtained    The right chest area was prepped and appropriately anesthetized with 1% lidocaine with epinephrine. Using the usual technique, vision of subcutaneous port and attached catheter was performed.  Fibrous capsule was incised and port was removed with blunt dissection.  Figure-of-eight 3-0 Vicryl suture was used to close the tract.  Incision was closed with inverted interrupted 3-0 Vicryl suture for the dermal layer and a running subcuticular 4-0 Monocryl suture for the skin.  Total length of the incision after closure was 3.0 cm. An appropriate dressing was applied.  The procedure was well tolerated and without complications.    Dr. Reese, DO, FACS      45 minutes  spent by me on the date of the encounter doing chart review, history and exam, documentation and  further activities per the note.  30 minutes of which was the procedure itself    Rolando Reese, DO

## 2023-08-16 ENCOUNTER — PATIENT OUTREACH (OUTPATIENT)
Dept: ONCOLOGY | Facility: CLINIC | Age: 78
End: 2023-08-16
Payer: COMMERCIAL

## 2023-08-16 NOTE — PROGRESS NOTES
[facility]: Cancer Care                                                                                            Called Di today to review the cancer treatment summary that was prepared and sent via [employer]. She states she is having difficultly logging into [employer] and is requesting it get mailed to her. We discussed the intent and purpose of the summary. She has no questions at this time.     Orquidea Chong, RN, BSN, OCN  Cancer Survivorship Nurse Coordinator

## 2023-10-22 ENCOUNTER — HEALTH MAINTENANCE LETTER (OUTPATIENT)
Age: 78
End: 2023-10-22

## 2023-11-21 ENCOUNTER — TELEPHONE (OUTPATIENT)
Dept: ONCOLOGY | Facility: CLINIC | Age: 78
End: 2023-11-21
Payer: COMMERCIAL

## 2023-11-21 NOTE — TELEPHONE ENCOUNTER
Di is calling back. She states she had a mammogram done at [facility] in [place]. She is having them send a copy to her and she will bring it in for her next appointment with Dr Meyer.

## 2023-11-21 NOTE — TELEPHONE ENCOUNTER
Pt had an appt with Mitch on 06/01/23 and he wanted a mammogram completed. She has an upcoming appt on 11/30/23 with him and mammogram has not been completed through [facility]. She doesn't remember if she had it done. She is going to call [place] and see if she did it there. She will call us back and let us know. I informed her we would need records from that imaging appt.     Grace PIÑA [facility]  [phone]

## 2023-11-30 ENCOUNTER — ONCOLOGY VISIT (OUTPATIENT)
Dept: ONCOLOGY | Facility: CLINIC | Age: 78
End: 2023-11-30
Attending: INTERNAL MEDICINE
Payer: COMMERCIAL

## 2023-11-30 ENCOUNTER — LAB (OUTPATIENT)
Dept: LAB | Facility: CLINIC | Age: 78
End: 2023-11-30
Payer: COMMERCIAL

## 2023-11-30 VITALS
WEIGHT: 224.56 LBS | TEMPERATURE: 96.9 F | HEART RATE: 74 BPM | SYSTOLIC BLOOD PRESSURE: 158 MMHG | HEIGHT: 67 IN | OXYGEN SATURATION: 97 % | BODY MASS INDEX: 35.25 KG/M2 | DIASTOLIC BLOOD PRESSURE: 76 MMHG

## 2023-11-30 DIAGNOSIS — D64.81 ANEMIA DUE TO CHEMOTHERAPY: ICD-10-CM

## 2023-11-30 DIAGNOSIS — Z12.31 BREAST CANCER SCREENING BY MAMMOGRAM: ICD-10-CM

## 2023-11-30 DIAGNOSIS — C50.112 MALIGNANT NEOPLASM OF CENTRAL PORTION OF LEFT BREAST IN FEMALE, ESTROGEN RECEPTOR NEGATIVE (H): ICD-10-CM

## 2023-11-30 DIAGNOSIS — D61.810 ANTINEOPLASTIC CHEMOTHERAPY INDUCED PANCYTOPENIA (H): ICD-10-CM

## 2023-11-30 DIAGNOSIS — T45.1X5A ANEMIA DUE TO CHEMOTHERAPY: ICD-10-CM

## 2023-11-30 DIAGNOSIS — C50.112 MALIGNANT NEOPLASM OF CENTRAL PORTION OF LEFT BREAST IN FEMALE, ESTROGEN RECEPTOR NEGATIVE (H): Primary | ICD-10-CM

## 2023-11-30 DIAGNOSIS — Z17.31 HER2-POSITIVE CARCINOMA OF LEFT BREAST (H): ICD-10-CM

## 2023-11-30 DIAGNOSIS — Z17.1 MALIGNANT NEOPLASM OF CENTRAL PORTION OF LEFT BREAST IN FEMALE, ESTROGEN RECEPTOR NEGATIVE (H): ICD-10-CM

## 2023-11-30 DIAGNOSIS — T45.1X5A CHEMOTHERAPY-INDUCED NEUROPATHY (H): ICD-10-CM

## 2023-11-30 DIAGNOSIS — T45.1X5A ANTINEOPLASTIC CHEMOTHERAPY INDUCED PANCYTOPENIA (H): ICD-10-CM

## 2023-11-30 DIAGNOSIS — Z17.1 MALIGNANT NEOPLASM OF CENTRAL PORTION OF LEFT BREAST IN FEMALE, ESTROGEN RECEPTOR NEGATIVE (H): Primary | ICD-10-CM

## 2023-11-30 DIAGNOSIS — G62.0 CHEMOTHERAPY-INDUCED NEUROPATHY (H): ICD-10-CM

## 2023-11-30 DIAGNOSIS — C50.912 HER2-POSITIVE CARCINOMA OF LEFT BREAST (H): ICD-10-CM

## 2023-11-30 LAB
ALBUMIN SERPL BCG-MCNC: 4.2 G/DL (ref 3.5–5.2)
ALP SERPL-CCNC: 83 U/L (ref 40–150)
ALT SERPL W P-5'-P-CCNC: 18 U/L (ref 0–50)
ANION GAP SERPL CALCULATED.3IONS-SCNC: 9 MMOL/L (ref 7–15)
AST SERPL W P-5'-P-CCNC: 20 U/L (ref 0–45)
BASOPHILS # BLD AUTO: 0.1 10E3/UL (ref 0–0.2)
BASOPHILS NFR BLD AUTO: 1 %
BILIRUB SERPL-MCNC: 0.3 MG/DL
BUN SERPL-MCNC: 26.5 MG/DL (ref 8–23)
CALCIUM SERPL-MCNC: 10.2 MG/DL (ref 8.8–10.2)
CHLORIDE SERPL-SCNC: 100 MMOL/L (ref 98–107)
CREAT SERPL-MCNC: 1.27 MG/DL (ref 0.51–0.95)
DEPRECATED HCO3 PLAS-SCNC: 28 MMOL/L (ref 22–29)
EGFRCR SERPLBLD CKD-EPI 2021: 43 ML/MIN/1.73M2
EOSINOPHIL # BLD AUTO: 0.4 10E3/UL (ref 0–0.7)
EOSINOPHIL NFR BLD AUTO: 5 %
ERYTHROCYTE [DISTWIDTH] IN BLOOD BY AUTOMATED COUNT: 12.9 % (ref 10–15)
GLUCOSE SERPL-MCNC: 152 MG/DL (ref 70–99)
HCT VFR BLD AUTO: 36.9 % (ref 35–47)
HGB BLD-MCNC: 12.1 G/DL (ref 11.7–15.7)
IMM GRANULOCYTES # BLD: 0 10E3/UL
IMM GRANULOCYTES NFR BLD: 1 %
LYMPHOCYTES # BLD AUTO: 1.1 10E3/UL (ref 0.8–5.3)
LYMPHOCYTES NFR BLD AUTO: 16 %
MCH RBC QN AUTO: 29.8 PG (ref 26.5–33)
MCHC RBC AUTO-ENTMCNC: 32.8 G/DL (ref 31.5–36.5)
MCV RBC AUTO: 91 FL (ref 78–100)
MONOCYTES # BLD AUTO: 0.5 10E3/UL (ref 0–1.3)
MONOCYTES NFR BLD AUTO: 7 %
NEUTROPHILS # BLD AUTO: 5.2 10E3/UL (ref 1.6–8.3)
NEUTROPHILS NFR BLD AUTO: 70 %
NRBC # BLD AUTO: 0 10E3/UL
NRBC BLD AUTO-RTO: 0 /100
PLATELET # BLD AUTO: 174 10E3/UL (ref 150–450)
POTASSIUM SERPL-SCNC: 4.7 MMOL/L (ref 3.4–5.3)
PROT SERPL-MCNC: 7 G/DL (ref 6.4–8.3)
RBC # BLD AUTO: 4.06 10E6/UL (ref 3.8–5.2)
SODIUM SERPL-SCNC: 137 MMOL/L (ref 135–145)
WBC # BLD AUTO: 7.3 10E3/UL (ref 4–11)

## 2023-11-30 PROCEDURE — 80053 COMPREHEN METABOLIC PANEL: CPT | Performed by: INTERNAL MEDICINE

## 2023-11-30 PROCEDURE — 36415 COLL VENOUS BLD VENIPUNCTURE: CPT

## 2023-11-30 PROCEDURE — 99214 OFFICE O/P EST MOD 30 MIN: CPT | Performed by: INTERNAL MEDICINE

## 2023-11-30 PROCEDURE — 85025 COMPLETE CBC W/AUTO DIFF WBC: CPT | Performed by: INTERNAL MEDICINE

## 2023-11-30 ASSESSMENT — PAIN SCALES - GENERAL: PAINLEVEL: NO PAIN (0)

## 2023-11-30 NOTE — LETTER
"    2023         RE: Di Donaldson  [address] [place] [address]        Dear Colleague,    Thank you for referring your patient, Di Donaldson, to the [facility] [place]. Please see a copy of my visit note below.    [facility] Hematology / Oncology  Progress Note 2023  Name: Di Donaldson  :  1945    MRN:  [id number]    --------------------    Assessment / Plan:  Early-stage, left-sided, hormone negative, HER-2 positive breast cancer:    Continue observation; clinically MARLO.  Continue gabapentin for chemo neuropathy; wean as able.  Chemo cytopenias recovered w/ time.  Chemistries stable; reviewed creatinine and glucose.  Mammogram reassuring w/ benign finding; planning next 6 month short follow-up.  Continue all age appropriate vaccinations.  RTC 6 months.    Osorio Meyer MD    --------------------    Interval History:  Di returns for follow-up of breast cancer unaccompanied.  All in all, she remains well.  Neuropathy stable; improving w/ time.  No nausea, vomiting.  No headaches.  No new bone pains.    Social History:  Social History     Tobacco Use     Smoking status: Never     Smokeless tobacco: Never   Substance Use Topics     Alcohol use: Yes     Family History:  History reviewed. No pertinent family history.  Immunizations:  Immunization History   Administered Date(s) Administered     COVID-19 MONOVALENT 12+ (Pfizer) 10/26/2021, 10/25/2022     COVID-19 Monovalent 18+ (Moderna) 2021     Health Maintenance Due   Topic Date Due     COVID-19 Vaccine ( season) 2023     --------------------    Physical Exam:  VS: BP (!) 158/76 (BP Location: Left arm, Patient Position: Sitting, Cuff Size: Adult Regular)   Pulse 74   Temp 96.9  F (36.1  C) (Temporal)   Ht 1.689 m (5' 6.5\")   Wt 101.9 kg (224 lb 9 oz)   SpO2 97%   BMI 35.70 kg/m  .  GEN: Well appearing.  BREAST: No palpable masses or axillary " adenopathy.    Labs / Imaging:  Reviewed CBC, CMP.  Reviewed mammogram.    Again, thank you for allowing me to participate in the care of your patient.        Sincerely,        Osorio Meyer MD

## 2023-12-09 NOTE — PROGRESS NOTES
"[facility] Hematology / Oncology  Progress Note 2023  Name: Di Donaldson  :  1945    MRN:  [id number]    --------------------    Assessment / Plan:  Early-stage, left-sided, hormone negative, HER-2 positive breast cancer:    Continue observation; clinically MARLO.  Continue gabapentin for chemo neuropathy; wean as able.  Chemo cytopenias recovered w/ time.  Chemistries stable; reviewed creatinine and glucose.  Mammogram reassuring w/ benign finding; planning next 6 month short follow-up.  Continue all age appropriate vaccinations.  RTC 6 months.    Osorio Meyer MD    --------------------    Interval History:  Di returns for follow-up of breast cancer unaccompanied.  All in all, she remains well.  Neuropathy stable; improving w/ time.  No nausea, vomiting.  No headaches.  No new bone pains.    Social History:  Social History     Tobacco Use    Smoking status: Never    Smokeless tobacco: Never   Substance Use Topics    Alcohol use: Yes     Family History:  History reviewed. No pertinent family history.  Immunizations:  Immunization History   Administered Date(s) Administered    COVID-19 MONOVALENT 12+ (Pfizer) 10/26/2021, 10/25/2022    COVID-19 Monovalent 18+ (Moderna) 2021     Health Maintenance Due   Topic Date Due    COVID-19 Vaccine ( season) 2023     --------------------    Physical Exam:  VS: BP (!) 158/76 (BP Location: Left arm, Patient Position: Sitting, Cuff Size: Adult Regular)   Pulse 74   Temp 96.9  F (36.1  C) (Temporal)   Ht 1.689 m (5' 6.5\")   Wt 101.9 kg (224 lb 9 oz)   SpO2 97%   BMI 35.70 kg/m  .  GEN: Well appearing.  BREAST: No palpable masses or axillary adenopathy.    Labs / Imaging:  Reviewed CBC, CMP.  Reviewed mammogram.  "

## 2024-03-09 ENCOUNTER — HEALTH MAINTENANCE LETTER (OUTPATIENT)
Age: 79
End: 2024-03-09

## 2024-03-25 ENCOUNTER — TELEPHONE (OUTPATIENT)
Dept: ONCOLOGY | Facility: CLINIC | Age: 79
End: 2024-03-25
Payer: COMMERCIAL

## 2024-03-25 NOTE — TELEPHONE ENCOUNTER
Reason for Call:  Other appointment    Detailed comments: Orders faxed to [facility] for mammogram to be done per patients request. Faxed to # [phone].     Phone Number Patient can be reached at: Home number on file [phone] (home)     Best Time: any    Can we leave a detailed message on this number? YES    Call taken on 3/25/2024 at 4:23 PM by Roshni Heller

## 2024-04-18 ENCOUNTER — TRANSFERRED RECORDS (OUTPATIENT)
Dept: HEALTH INFORMATION MANAGEMENT | Facility: CLINIC | Age: 79
End: 2024-04-18

## 2024-05-28 ENCOUNTER — TELEPHONE (OUTPATIENT)
Dept: ONCOLOGY | Facility: CLINIC | Age: 79
End: 2024-05-28
Payer: COMMERCIAL

## 2024-05-28 NOTE — TELEPHONE ENCOUNTER
Pt has an appt with Mitch on 06/06 and she gets her mammograms done elsewhere. She is going to call and have them fax us the results for her upcoming appt. Watch for this.    Grace PIÑA [facility]  [phone]

## 2024-05-29 NOTE — TELEPHONE ENCOUNTER
Received records on 05/29. Sent to stat scan and placed in [facility] box with a note on it for her 06/06 appt with him. I also added in appt note where the records are for him to review.    Grace PIÑA [facility]  [phone]

## 2024-05-29 NOTE — TELEPHONE ENCOUNTER
I have attempted to contact this patient by phone with the following results: left message to return my call on answering machine.    Called Roshni (medical records in [facility]) in regards to mammo results. I left message to fax results for her and to call me and let me know that she did this.    Grace PIÑA [facility]   [facility]  [phone]

## 2024-06-06 ENCOUNTER — ONCOLOGY VISIT (OUTPATIENT)
Dept: ONCOLOGY | Facility: CLINIC | Age: 79
End: 2024-06-06
Payer: COMMERCIAL

## 2024-06-06 ENCOUNTER — LAB (OUTPATIENT)
Dept: LAB | Facility: CLINIC | Age: 79
End: 2024-06-06
Payer: COMMERCIAL

## 2024-06-06 VITALS
HEIGHT: 67 IN | TEMPERATURE: 97.1 F | DIASTOLIC BLOOD PRESSURE: 64 MMHG | WEIGHT: 238.44 LBS | OXYGEN SATURATION: 98 % | SYSTOLIC BLOOD PRESSURE: 116 MMHG | HEART RATE: 83 BPM | BODY MASS INDEX: 37.42 KG/M2

## 2024-06-06 DIAGNOSIS — C50.912 HER2-POSITIVE CARCINOMA OF LEFT BREAST (H): ICD-10-CM

## 2024-06-06 DIAGNOSIS — Z17.1 MALIGNANT NEOPLASM OF CENTRAL PORTION OF LEFT BREAST IN FEMALE, ESTROGEN RECEPTOR NEGATIVE (H): ICD-10-CM

## 2024-06-06 DIAGNOSIS — Z17.31 HER2-POSITIVE CARCINOMA OF LEFT BREAST (H): ICD-10-CM

## 2024-06-06 DIAGNOSIS — C50.112 MALIGNANT NEOPLASM OF CENTRAL PORTION OF LEFT BREAST IN FEMALE, ESTROGEN RECEPTOR NEGATIVE (H): Primary | ICD-10-CM

## 2024-06-06 DIAGNOSIS — Z17.1 MALIGNANT NEOPLASM OF CENTRAL PORTION OF LEFT BREAST IN FEMALE, ESTROGEN RECEPTOR NEGATIVE (H): Primary | ICD-10-CM

## 2024-06-06 DIAGNOSIS — T45.1X5A CHEMOTHERAPY-INDUCED NEUROPATHY (H): ICD-10-CM

## 2024-06-06 DIAGNOSIS — G62.0 CHEMOTHERAPY-INDUCED NEUROPATHY (H): ICD-10-CM

## 2024-06-06 DIAGNOSIS — C50.112 MALIGNANT NEOPLASM OF CENTRAL PORTION OF LEFT BREAST IN FEMALE, ESTROGEN RECEPTOR NEGATIVE (H): ICD-10-CM

## 2024-06-06 LAB
ALBUMIN SERPL BCG-MCNC: 4.1 G/DL (ref 3.5–5.2)
ALP SERPL-CCNC: 82 U/L (ref 40–150)
ALT SERPL W P-5'-P-CCNC: 21 U/L (ref 0–50)
ANION GAP SERPL CALCULATED.3IONS-SCNC: 10 MMOL/L (ref 7–15)
AST SERPL W P-5'-P-CCNC: 19 U/L (ref 0–45)
BILIRUB SERPL-MCNC: 0.3 MG/DL
BUN SERPL-MCNC: 25.8 MG/DL (ref 8–23)
CALCIUM SERPL-MCNC: 9.7 MG/DL (ref 8.8–10.2)
CHLORIDE SERPL-SCNC: 102 MMOL/L (ref 98–107)
CREAT SERPL-MCNC: 1.24 MG/DL (ref 0.51–0.95)
DEPRECATED HCO3 PLAS-SCNC: 24 MMOL/L (ref 22–29)
EGFRCR SERPLBLD CKD-EPI 2021: 44 ML/MIN/1.73M2
GLUCOSE SERPL-MCNC: 222 MG/DL (ref 70–99)
POTASSIUM SERPL-SCNC: 5.2 MMOL/L (ref 3.4–5.3)
PROT SERPL-MCNC: 7 G/DL (ref 6.4–8.3)
SODIUM SERPL-SCNC: 136 MMOL/L (ref 135–145)

## 2024-06-06 PROCEDURE — 80053 COMPREHEN METABOLIC PANEL: CPT | Performed by: INTERNAL MEDICINE

## 2024-06-06 PROCEDURE — 36415 COLL VENOUS BLD VENIPUNCTURE: CPT

## 2024-06-06 PROCEDURE — 99213 OFFICE O/P EST LOW 20 MIN: CPT | Performed by: INTERNAL MEDICINE

## 2024-06-06 RX ORDER — POTASSIUM CHLORIDE 750 MG/1
1 TABLET, EXTENDED RELEASE ORAL
COMMUNITY
Start: 2024-05-20

## 2024-06-06 RX ORDER — FUROSEMIDE 20 MG
20 TABLET ORAL
COMMUNITY
Start: 2024-05-20

## 2024-06-06 ASSESSMENT — PAIN SCALES - GENERAL: PAINLEVEL: NO PAIN (0)

## 2024-06-06 NOTE — Clinical Note
2024      Di Donaldson  [address] [place] [address]      Dear Colleague,    Thank you for referring your patient, Di Donaldson, to the [facility] [place]. Please see a copy of my visit note below.    [facility] Hematology / Oncology  Progress Note  Name: Di Donaldson  :  1945    MRN:  [id number]    --------------------    Assessment / Plan:  Early-stage, left-sided, hormone negative, HER-2 positive breast cancer:    Continue observation; clinically and radiographically MARLO.  Continue gabapentin for chemo neuropathy; wean as able.  Chemistries stable; reviewed creatinine / CKD and glucose.  Mammogram reassuring w/ benign finding; planning next 6 month short follow-up.  Continue all age appropriate vaccinations.  Will likely establish back w/ PCP and [facility].    Osorio Meyer MD    --------------------    Interval History:  Di returns for follow-up of breast cancer unaccompanied.  All in all, she remains well.  Neuropathy stable; improving w/ time.  No chest pain or palpitations.  Hair returned a little thinner.  No chemo brain.  No nausea, vomiting.  No headaches.  No new bone pains.  Appetite and weight stable.  No chemo brain.    Social History:  Social History     Tobacco Use    Smoking status: Never    Smokeless tobacco: Never   Substance Use Topics    Alcohol use: Yes     Comment: very little     Family History:  History reviewed. No pertinent family history.  Immunizations:  Immunization History   Administered Date(s) Administered    COVID-19 MONOVALENT 12+ (Pfizer) 10/26/2021, 10/25/2022    COVID-19 Monovalent 18+ (Moderna) 2021     Health Maintenance Due   Topic Date Due    COVID-19 Vaccine ( season) 2023     --------------------    Physical Exam:  VS: /64 (BP Location: Right arm, Patient Position: Sitting, Cuff Size: Adult Large)   Pulse 83   Temp 97.1  F (36.2  C) (Temporal)   Ht 1.689 m  "(5' 6.5\")   Wt 108.2 kg (238 lb 7 oz)   SpO2 98%   BMI 37.91 kg/m  .  GEN: Well appearing.    Labs / Imaging:  Reviewed CMP.  Reviewed mammogram.      Again, thank you for allowing me to participate in the care of your patient.        Sincerely,        Osorio Meyer MD  "

## 2024-06-06 NOTE — PROGRESS NOTES
"[facility] Hematology / Oncology  Progress Note  Name: Di Donaldson  :  1945    MRN:  [id number]    --------------------    Assessment / Plan:  Early-stage, left-sided, hormone negative, HER-2 positive breast cancer:    Continue observation; clinically and radiographically MARLO.  Continue gabapentin for chemo neuropathy; wean as able.  Chemistries stable; reviewed creatinine / CKD and glucose.  Mammogram reassuring w/ benign finding; planning next 6 month short follow-up.  Continue all age appropriate vaccinations.  Will likely establish back w/ PCP and [facility] onc[facility].    Osorio Meyer MD    --------------------    Interval History:  Di returns for follow-up of breast cancer unaccompanied.  All in all, she remains well.  Neuropathy stable; improving w/ time.  No chest pain or palpitations.  Hair returned a little thinner.  No chemo brain.  No nausea, vomiting.  No headaches.  No new bone pains.  Appetite and weight stable.  No chemo brain.    Social History:  Social History     Tobacco Use    Smoking status: Never    Smokeless tobacco: Never   Substance Use Topics    Alcohol use: Yes     Comment: very little     Family History:  History reviewed. No pertinent family history.  Immunizations:  Immunization History   Administered Date(s) Administered    COVID-19 MONOVALENT 12+ (Pfizer) 10/26/2021, 10/25/2022    COVID-19 Monovalent 18+ (Moderna) 2021     Health Maintenance Due   Topic Date Due    COVID-19 Vaccine ( season) 2023     --------------------    Physical Exam:  VS: /64 (BP Location: Right arm, Patient Position: Sitting, Cuff Size: Adult Large)   Pulse 83   Temp 97.1  F (36.2  C) (Temporal)   Ht 1.689 m (5' 6.5\")   Wt 108.2 kg (238 lb 7 oz)   SpO2 98%   BMI 37.91 kg/m  .  GEN: Well appearing.    Labs / Imaging:  Reviewed CMP.  Reviewed mammogram.  "

## 2024-07-27 ENCOUNTER — HEALTH MAINTENANCE LETTER (OUTPATIENT)
Age: 79
End: 2024-07-27

## 2024-12-14 ENCOUNTER — HEALTH MAINTENANCE LETTER (OUTPATIENT)
Age: 79
End: 2024-12-14

## 2025-03-16 ENCOUNTER — HEALTH MAINTENANCE LETTER (OUTPATIENT)
Age: 80
End: 2025-03-16

## (undated) DEVICE — SYR BULB IRRIG DOVER 60 ML LATEX FREE 67000

## (undated) DEVICE — SOL WATER IRRIG 1000ML BOTTLE 07139-09

## (undated) DEVICE — CLIP ETHICON LIGACLIP SM BLUE LT100

## (undated) DEVICE — GLOVE PROTEXIS W/NEU-THERA 5.5  2D73TE55

## (undated) DEVICE — ESU ELEC BLADE 2.75" COATED/INSULATED E1455

## (undated) DEVICE — MARKER SKIN DOUBLE TIP W/FLEXI-RULER W/LABELS

## (undated) DEVICE — GLOVE PROTEXIS W/NEU-THERA 7.5  2D73TE75

## (undated) DEVICE — BLADE KNIFE SURG 10 371110

## (undated) DEVICE — SYR 10ML LL W/O NDL 302995

## (undated) DEVICE — MARKER MARGIN MARKER STD 6 COLOR SGL USE MMS6

## (undated) DEVICE — SU VICRYL 3-0 SH 27" UND J416H

## (undated) DEVICE — SUCTION MANIFOLD NEPTUNE 2 SYS 4 PORT 0702-020-000

## (undated) DEVICE — SYR 03ML LL W/O NDL 309657

## (undated) DEVICE — ESU PENCIL W/SMOKE EVAC E2515HS

## (undated) DEVICE — SU VICRYL 2-0 SH 27" UND J417H

## (undated) DEVICE — COVER TRANSDUCER PROBE 7X24" 610-575

## (undated) DEVICE — BLADE KNIFE SURG 11 371111

## (undated) DEVICE — GLOVE PROTEXIS BLUE W/NEU-THERA 8.0  2D73EB80

## (undated) DEVICE — PACK MINOR PROCEDURE CUSTOM

## (undated) DEVICE — PREP CHLORAPREP 26ML TINTED ORANGE  260815

## (undated) DEVICE — ESU HOLSTER PLASTIC DISP E2400

## (undated) DEVICE — ADH SKIN CLOSURE PREMIERPRO EXOFIN 1.0ML 3470

## (undated) DEVICE — NDL ECLIPSE 18GA 1.5"

## (undated) DEVICE — GLOVE PROTEXIS BLUE W/NEU-THERA 6.0  2D73EB60

## (undated) DEVICE — DRAPE C-ARM 60X42" 1013

## (undated) DEVICE — DRSG STERI STRIP 1/2X4" R1547

## (undated) DEVICE — SOL SODIUM CHLORIDE 250ML

## (undated) DEVICE — DRAPE U SPLIT 74X120" 29440

## (undated) DEVICE — STOCKING SLEEVE COMPRESSION CALF MED

## (undated) DEVICE — DECANTER VIAL 2006S

## (undated) DEVICE — DRAPE LAP TRANSVERSE 29421

## (undated) DEVICE — DRSG PRIMAPORE 03 1/8X6" 66000318

## (undated) DEVICE — NDL 30GA 1" 305128

## (undated) DEVICE — SU MONOCRYL 4-0 PS-2 18" UND Y496G

## (undated) DEVICE — PACK MINOR SBA15MIFSE

## (undated) DEVICE — DRAPE IOBAN INCISE 23X17" 6650EZ

## (undated) DEVICE — COVER TRANSDUCER PROBE 610-637

## (undated) DEVICE — DRAPE C-ARM

## (undated) RX ORDER — HEPARIN SODIUM 1000 [USP'U]/ML
INJECTION, SOLUTION INTRAVENOUS; SUBCUTANEOUS
Status: DISPENSED
Start: 2022-02-07

## (undated) RX ORDER — ONDANSETRON 2 MG/ML
INJECTION INTRAMUSCULAR; INTRAVENOUS
Status: DISPENSED
Start: 2022-06-01

## (undated) RX ORDER — HEPARIN SODIUM (PORCINE) LOCK FLUSH IV SOLN 100 UNIT/ML 100 UNIT/ML
SOLUTION INTRAVENOUS
Status: DISPENSED
Start: 2022-02-07

## (undated) RX ORDER — FENTANYL CITRATE 50 UG/ML
INJECTION, SOLUTION INTRAMUSCULAR; INTRAVENOUS
Status: DISPENSED
Start: 2022-06-01

## (undated) RX ORDER — EPINEPHRINE 1 MG/ML
INJECTION, SOLUTION INTRAMUSCULAR; SUBCUTANEOUS
Status: DISPENSED
Start: 2022-02-07

## (undated) RX ORDER — ACETAMINOPHEN 325 MG/1
TABLET ORAL
Status: DISPENSED
Start: 2022-06-01

## (undated) RX ORDER — FENTANYL CITRATE 50 UG/ML
INJECTION, SOLUTION INTRAMUSCULAR; INTRAVENOUS
Status: DISPENSED
Start: 2022-02-07

## (undated) RX ORDER — PROPOFOL 10 MG/ML
INJECTION, EMULSION INTRAVENOUS
Status: DISPENSED
Start: 2022-06-01

## (undated) RX ORDER — CEFAZOLIN SODIUM 1 G/3ML
INJECTION, POWDER, FOR SOLUTION INTRAMUSCULAR; INTRAVENOUS
Status: DISPENSED
Start: 2022-06-01

## (undated) RX ORDER — BUPIVACAINE HYDROCHLORIDE 2.5 MG/ML
INJECTION, SOLUTION EPIDURAL; INFILTRATION; INTRACAUDAL
Status: DISPENSED
Start: 2022-02-07